# Patient Record
Sex: FEMALE | Race: WHITE | NOT HISPANIC OR LATINO | Employment: PART TIME | URBAN - NONMETROPOLITAN AREA
[De-identification: names, ages, dates, MRNs, and addresses within clinical notes are randomized per-mention and may not be internally consistent; named-entity substitution may affect disease eponyms.]

---

## 2017-02-06 ENCOUNTER — HISTORY (OUTPATIENT)
Dept: FAMILY MEDICINE | Facility: OTHER | Age: 72
End: 2017-02-06

## 2017-02-06 ENCOUNTER — OFFICE VISIT - GICH (OUTPATIENT)
Dept: FAMILY MEDICINE | Facility: OTHER | Age: 72
End: 2017-02-06

## 2017-02-06 DIAGNOSIS — M06.9 RHEUMATOID ARTHRITIS (H): ICD-10-CM

## 2017-06-12 ENCOUNTER — OFFICE VISIT - GICH (OUTPATIENT)
Dept: FAMILY MEDICINE | Facility: OTHER | Age: 72
End: 2017-06-12

## 2017-06-12 ENCOUNTER — HISTORY (OUTPATIENT)
Dept: FAMILY MEDICINE | Facility: OTHER | Age: 72
End: 2017-06-12

## 2017-06-12 DIAGNOSIS — R42 DIZZINESS AND GIDDINESS: ICD-10-CM

## 2017-06-12 DIAGNOSIS — L98.9 DISORDER OF SKIN OR SUBCUTANEOUS TISSUE: ICD-10-CM

## 2017-06-12 DIAGNOSIS — Z23 ENCOUNTER FOR IMMUNIZATION: ICD-10-CM

## 2017-06-19 ENCOUNTER — OFFICE VISIT - GICH (OUTPATIENT)
Dept: FAMILY MEDICINE | Facility: OTHER | Age: 72
End: 2017-06-19

## 2017-06-19 ENCOUNTER — HISTORY (OUTPATIENT)
Dept: FAMILY MEDICINE | Facility: OTHER | Age: 72
End: 2017-06-19

## 2017-06-19 DIAGNOSIS — M06.9 RHEUMATOID ARTHRITIS (H): ICD-10-CM

## 2017-06-19 DIAGNOSIS — Z00.00 ENCOUNTER FOR GENERAL ADULT MEDICAL EXAMINATION WITHOUT ABNORMAL FINDINGS: ICD-10-CM

## 2017-06-19 DIAGNOSIS — F32.9 MAJOR DEPRESSIVE DISORDER, SINGLE EPISODE: ICD-10-CM

## 2017-06-19 DIAGNOSIS — E78.5 HYPERLIPIDEMIA: ICD-10-CM

## 2017-06-19 DIAGNOSIS — F41.1 GENERALIZED ANXIETY DISORDER: ICD-10-CM

## 2017-06-19 LAB
A/G RATIO - HISTORICAL: 1 (ref 1–2)
ABSOLUTE BASOPHILS - HISTORICAL: 0.1 THOU/CU MM
ABSOLUTE EOSINOPHILS - HISTORICAL: 0.2 THOU/CU MM
ABSOLUTE IMMATURE GRANULOCYTES(METAS,MYELOS,PROS) - HISTORICAL: 0 THOU/CU MM
ABSOLUTE LYMPHOCYTES - HISTORICAL: 1.2 THOU/CU MM (ref 0.9–2.9)
ABSOLUTE MONOCYTES - HISTORICAL: 0.7 THOU/CU MM
ABSOLUTE NEUTROPHILS - HISTORICAL: 8.4 THOU/CU MM (ref 1.7–7)
ALBUMIN SERPL-MCNC: 3.8 G/DL (ref 3.5–5.7)
ALP SERPL-CCNC: 72 IU/L (ref 34–104)
ALT (SGPT) - HISTORICAL: 12 IU/L (ref 7–52)
ANION GAP - HISTORICAL: 12 (ref 5–18)
AST SERPL-CCNC: 17 IU/L (ref 13–39)
BACTERIA URINE: NORMAL BACTERIA/HPF
BASOPHILS # BLD AUTO: 0.5 %
BILIRUB SERPL-MCNC: 0.3 MG/DL (ref 0.3–1)
BILIRUB UR QL: NEGATIVE
BUN SERPL-MCNC: 13 MG/DL (ref 7–25)
BUN/CREAT RATIO - HISTORICAL: 19
CALCIUM SERPL-MCNC: 9 MG/DL (ref 8.6–10.3)
CHLORIDE SERPLBLD-SCNC: 105 MMOL/L (ref 98–107)
CHOL/HDL RATIO - HISTORICAL: 4.75
CHOLESTEROL TOTAL: 271 MG/DL
CLARITY, URINE: CLEAR CLARITY
CO2 SERPL-SCNC: 22 MMOL/L (ref 21–31)
COLOR UR: YELLOW COLOR
CREAT SERPL-MCNC: 0.67 MG/DL (ref 0.7–1.3)
EOSINOPHIL NFR BLD AUTO: 1.4 %
EPITHELIAL CELLS: NORMAL EPI/HPF
ERYTHROCYTE [DISTWIDTH] IN BLOOD BY AUTOMATED COUNT: 12.7 % (ref 11.5–15.5)
GFR IF NOT AFRICAN AMERICAN - HISTORICAL: >60 ML/MIN/1.73M2
GLOBULIN - HISTORICAL: 3.9 G/DL (ref 2–3.7)
GLUCOSE SERPL-MCNC: 97 MG/DL (ref 70–105)
GLUCOSE URINE: NEGATIVE MG/DL
HCT VFR BLD AUTO: 37 % (ref 33–51)
HDLC SERPL-MCNC: 57 MG/DL (ref 23–92)
HEMOGLOBIN: 12.3 G/DL (ref 12–16)
IMMATURE GRANULOCYTES(METAS,MYELOS,PROS) - HISTORICAL: 0.4 %
KETONES UR QL: NEGATIVE MG/DL
LDLC SERPL CALC-MCNC: 180 MG/DL
LEUKOCYTE ESTERASE URINE: NEGATIVE
LYMPHOCYTES NFR BLD AUTO: 11.4 % (ref 20–44)
MCH RBC QN AUTO: 31.4 PG (ref 26–34)
MCHC RBC AUTO-ENTMCNC: 33.2 G/DL (ref 32–36)
MCV RBC AUTO: 94 FL (ref 80–100)
MONOCYTES NFR BLD AUTO: 6.8 %
NEUTROPHILS NFR BLD AUTO: 79.5 % (ref 42–72)
NITRITE UR QL STRIP: NEGATIVE
NON-HDL CHOLESTEROL - HISTORICAL: 214 MG/DL
OCCULT BLOOD,URINE - HISTORICAL: ABNORMAL
PATIENT STATUS - HISTORICAL: ABNORMAL
PH UR: 6.5 [PH]
PLATELET # BLD AUTO: 351 THOU/CU MM (ref 140–440)
PMV BLD: 8.8 FL (ref 6.5–11)
POTASSIUM SERPL-SCNC: 4.2 MMOL/L (ref 3.5–5.1)
PROT SERPL-MCNC: 7.7 G/DL (ref 6.4–8.9)
PROTEIN QUALITATIVE,URINE - HISTORICAL: NEGATIVE MG/DL
RBC - HISTORICAL: NORMAL /HPF
RED BLOOD COUNT - HISTORICAL: 3.92 MIL/CU MM (ref 4–5.2)
SODIUM SERPL-SCNC: 139 MMOL/L (ref 133–143)
SP GR UR STRIP: <=1.005
TRIGL SERPL-MCNC: 171 MG/DL
UROBILINOGEN,QUALITATIVE - HISTORICAL: NORMAL EU/DL
WBC - HISTORICAL: NORMAL /HPF
WHITE BLOOD COUNT - HISTORICAL: 10.5 THOU/CU MM (ref 4.5–11)

## 2017-07-13 ENCOUNTER — HISTORY (OUTPATIENT)
Dept: FAMILY MEDICINE | Facility: OTHER | Age: 72
End: 2017-07-13

## 2017-07-13 ENCOUNTER — OFFICE VISIT - GICH (OUTPATIENT)
Dept: FAMILY MEDICINE | Facility: OTHER | Age: 72
End: 2017-07-13

## 2017-07-13 DIAGNOSIS — M06.9 RHEUMATOID ARTHRITIS (H): ICD-10-CM

## 2017-07-13 DIAGNOSIS — R07.1 CHEST PAIN ON BREATHING: ICD-10-CM

## 2017-12-27 NOTE — PROGRESS NOTES
Patient Information     Patient Name MRN Sex Rebekah Gong [id number] Female 1945      Progress Notes by Juan Valdivia MD at 2017  7:45 AM     Author:  Juan Valdivia MD Service:  (none) Author Type:  Physician     Filed:  2017  8:48 AM Encounter Date:  2017 Status:  Signed     :  Juan Valdivia MD (Physician)            See hp

## 2017-12-27 NOTE — PROGRESS NOTES
Patient Information     Patient Name MRN Sex     Rebekah Shirley [id number] Female 1945      Progress Notes by Juan Valdivia MD at 2017  8:30 AM     Author:  Juan Valdivia MD Service:  (none) Author Type:  Physician     Filed:  2017  8:50 AM Encounter Date:  2017 Status:  Signed     :  Juan Valdivia MD (Physician)            Nursing Notes:   Bere Mancia  2017  8:40 AM  Signed  Patient presents to the clinic for a follow up on dizziness.  Bere Mancia LPN....................2017 8:14 AM    Rebekah Shirley is a 72 y.o. female who presents for   Chief Complaint     Patient presents with       Dizziness      Follow up dizziness     HPI: Ms. Shirley comes in stating pain is cut in half since starting oxycodone once each AM and cymbalta; it is toerable at this time which is a major change; she has noticed that she is getting AM headaches which are new but it goes away after she gets up. Sh ewould like to continue both medications. No constipation .  Past Medical History:     Diagnosis  Date     Hx of pregnancy      4, para 3.      HYPERLIPIDEMIA, MILD      TOBACCO ABUSE      Past Surgical History:      Procedure  Laterality Date     APPENDECTOMY      Incidental appendectomy at time of adhesion lysis.       COLONOSCOPY  06    Colonoscopy (screening), normal, Dr. Orellana       LAPAROTOMY      History of partial small bowel obstruction necessitating exploratory laparotomy.  Small area of adhesions with secondary lysis felt responsible for the partial obstruction.       TUBAL LIGATION       Family History       Problem   Relation Age of Onset     Heart Disease  Father 58     MI       Arthritis  Mother      osteoarthritis       Hyperlipidemia  Mother      hyperlipidemia       Current Outpatient Prescriptions       Medication  Sig Dispense Refill     aspirin (ECOTRIN) 81 mg enteric coated tablet Take 1 tablet by mouth once daily with a meal.  0      "DULoxetine (CYMBALTA) 20 mg Delayed-release capsule Take 1 capsule by mouth once daily. 60 capsule 6     folic acid 1 mg tablet Take 1 tablet by mouth once daily.  0     methotrexate (RHEUMATREX) 2.5 mg tablet Take 25 mg by mouth once weekly.       nitroglycerin (NITROSTAT) 0.4 mg sublingual tablet Place 1 tablet under the tongue every 5 minutes if needed for Chest Pain. take up to 3 times every 5 minutes and call 911 if pain persists 1 tablet 0     oxyCODONE (ROXICODONE) 5 mg immediate release tablet Take 1 tablet by mouth every 4 hours if needed  for Pain 30 tablet 0     predniSONE (DELTASONE) 1 mg tablet Take 6 tablets by mouth once daily with a meal.  0     sulfaSALAzine (AZULFIDINE) 500 mg tablet TAKE 2 TABLETS BY MOUTH EVERY MORNING AND 3 TABLETS EVERY EVENING 450 tablet 3     No current facility-administered medications for this visit.      Medications have been reviewed by me and are current to the best of my knowledge and ability.    No Known Allergies      EXAM:   Vitals:     07/13/17 0816   BP: 118/70   Temp: 98.5  F (36.9  C)   TempSrc: Temporal   Weight: 70.3 kg (155 lb)   Height: 1.5 m (4' 11.06\")     General Appearance: Pleasant, alert, appropriate appearance for age. No acute distress  ASSESSMENT AND PLAN:  1. Chest pain on breathing    - nitroglycerin (NITROSTAT) 0.4 mg sublingual tablet; Place 1 tablet under the tongue every 5 minutes if needed for Chest Pain. take up to 3 times every 5 minutes and call 911 if pain persists  Dispense: 1 tablet; Refill: 0    2. Rheumatoid arthritis of hand, unspecified laterality, unspecified rheumatoid factor presence (HC)  Improved pain managemant  - oxyCODONE (ROXICODONE) 5 mg immediate release tablet; Take 1 tablet by mouth every 4 hours if needed  for Pain  Dispense: 30 tablet; Refill: 0/ conntract done today with 6 months Rx. Reviewed contract with her                 "

## 2017-12-28 NOTE — PROGRESS NOTES
"Patient Information     Patient Name MRN Sex Rebekah Rodrigues [id number] Female 1945      Progress Notes by Kathya Neely MD at 2017  9:30 AM     Author:  Kathya Neely MD Service:  (none) Author Type:  Physician     Filed:  2017  3:11 PM Encounter Date:  2017 Status:  Signed     :  Kathya Neely MD (Physician)            SUBJECTIVE:    Rebekah Shirley is a 72 y.o. female who presents about feeling dizzy and has a lump on her head and associates the 2 together. The \"bump\" does not hurt but \"my head does not feel right\". She has noticed the bump for at least a year with no change in size and its at the back of the base of her head so she can't see it. Just feels like a \"pimple\" when she touches it. When asked about her dizziness she is not describing vertiginous symptoms, spinning but primarily just a sensation that \"something is not right in my head or I'm not quite there\". There are no orthostatic symptoms and doesn't change with change in position or rolling over in bed. On review this patient is managed by rheumatology and is on most of her medications through [facility]'s rheumatology Department which does not include doing other labs such as CMP. She has not a physical or general exam in several years and is encouraged to come back since it appears that she has not had most immunizations updated as well. I surveyed [facility]'s records and they have not given those to her and it is not in the [facility] system which is somewhat unusual since she is on methotrexate and Remicade and prednisone.      HPI    No Known Allergies,   Current Outpatient Prescriptions:      aspirin (ECOTRIN) 81 mg enteric coated tablet, Take 1 tablet by mouth once daily with a meal., Disp: , Rfl: 0     folic acid 1 mg tablet, Take 1 tablet by mouth once daily., Disp: , Rfl: 0     methotrexate (RHEUMATREX) 2.5 mg tablet, Take 25 mg by mouth once weekly., Disp: , Rfl:      nitroglycerin (NITROSTAT) 0.4 mg " "sublingual tablet, Place 1 tablet under the tongue every 5 minutes if needed for Chest Pain. take up to 3 times every 5 minutes and call 911 if pain persists, Disp: 1 tablet, Rfl: 0     predniSONE (DELTASONE) 1 mg tablet, Take 6 tablets by mouth once daily with a meal., Disp: , Rfl: 0     sulfaSALAzine (AZULFIDINE) 500 mg tablet, TAKE 2 TABLETS BY MOUTH EVERY MORNING AND 3 TABLETS EVERY EVENING, Disp: 450 tablet, Rfl: 3     traMADol (ULTRAM) 50 mg tablet, Take 1 tablet by mouth 2 times daily if needed for Pain., Disp: 50 tablet, Rfl: 0  Medications have been reviewed by me and are current to the best of my knowledge and ability. and   Patient Active Problem List      Diagnosis Date Noted     Rheumatoid arthritis(714.0) 08/20/2013     Anxiety state, unspecified 05/06/2013     HYPERLIPIDEMIA, MILD      Social History     Social History        Marital status:  Single     Spouse name:      Number of children:  3     Years of education:  N/A     Occupational History       [employer]chen [employer]     Social History Main Topics        Smoking status:  Former Smoker     Packs/day: 0.10     Quit date: 11/5/2008     Smokeless tobacco:  Never Used     Alcohol use  No     Drug use:  No     Sexual activity:  Not on file     Other Topics  Concern     Not on file      Social History Narrative     .  Three sons.      4 grandchildren.     Works for [employer] in the kitchen.               REVIEW OF SYSTEMS:  ROS    OBJECTIVE:  /78  Pulse 68  Ht 1.52 m (4' 11.84\")  Wt 72.6 kg (160 lb)  BMI 31.41 kg/m2    EXAM:   Physical Exam   Constitutional: She is well-developed, well-nourished, and in no distress. No distress.   Cardiovascular: Normal rate.    Neurological: She is alert. Gait normal.   Skin:   On inspection of posterior scalp she has a 3 mm lump that appears to be a small inclusion cyst.   Psychiatric: Affect normal.   Nursing note and vitals reviewed.      ASSESSMENT/PLAN:    " ICD-10-CM    1. Light headed R42    2. Encounter for immunization Z23 OMNI PREVNAR 13 (AKA PNEUMOCOCCAL VACCINE 13-VALENT IM)      [place] ADMIN VACC INITIAL   3. Skin lesion of scalp L98.9         Plan:    Labs done by [facility] recently reviewed and normal hemoglobin and creatinine and SGOT.  Suggest physical and other labs due and prefers to see her PCP for this.  Reassured the skin lesion does not need to be removed or have anything to do with feeling light headed.  Pneumococcal 13 given.  Increase fluid intake.  Kathya Neely MD  3:11 PM 6/12/2017

## 2017-12-29 NOTE — PATIENT INSTRUCTIONS
Patient Information     Patient Name MRN Rebekah Whitten [id number] Female 1945      Patient Instructions by Kathya Neely MD at 2017  9:58 AM     Author:  Kathya Neely MD Service:  (none) Author Type:  Physician     Filed:  2017  9:58 AM Encounter Date:  2017 Status:  Signed     :  Kathya Neely MD (Physician)               Index [place] Related topics   Pneumococcal Shot   ________________________________________________________________________  KEY POINTS    The pneumococcal shot protects you against the bacteria called pneumococci that can cause dangerous infections in the lungs, blood, and brain.    The pneumococcal shot is recommended for all adults 65 and older. Your healthcare provider may also recommend the shot if you are younger than 65 and have a serious health condition.    It takes 2 to 3 weeks after you get the shot before you are protected from getting sick.    You can get the shot at your healthcare provider's office or at most local health departments.  ________________________________________________________________________  What is the pneumococcal shot?  The pneumococcal shot protects you against the bacteria called pneumococci. These bacteria are spread from person to person. They can cause pneumonia and dangerous infections in the blood and brain.  Pneumococcal disease can happen after an illness such as a cold or the flu. The risk of a serious infection is greater if you have a chronic disease or are over age 65. Pneumococcal disease is hard to treat. Many antibiotics that worked in past years no longer work. This makes prevention very important.  Who should have the shot?  The pneumococcal shot is recommended for all adults 65 and older. Your healthcare provider may also recommend the shot if you are younger than 65 and have a serious health condition such as lung, liver or kidney disease, sickle cell disease, or your immune system does not  work well or you have had an organ transplant. Talk to your provider to see if you are at risk and should have this shot before age 65. Check with your healthcare provider before getting the shot if you:    Are ill or have a fever    Are pregnant    Know you are allergic to the vaccine  Depending on your vaccine history and your medical condition, you may need a  booster  shot one year after the first.  What are possible side effects?  After getting this shot you may have redness, soreness, or swelling in the area where you had the shot. This usually lasts just a day or two. You cannot get a pneumococcal infection from the shot.  What else should I know about this shot?  It takes 2 to 3 weeks after you get the shot before you are protected from getting sick.  You can get the shot at your healthcare provider's office or at most local health departments. You can have a flu shot and a pneumococcal shot at the same time without increasing the risk for side effects. Check with your healthcare provider about other shots you may need.  You can get more information from:    National Foundation for Infectious Diseases  359-046-5277   http://www.nfid.org    American Lung Association  626-399-9741   http://www.lungusa.org  Developed by [employer].  Adult Advisor 2016.3 published by [employer].  Last modified: 2016-04-01  Last reviewed: 2016-03-31  This content is reviewed periodically and is subject to change as new health information becomes available. The information is intended to inform and educate and is not a replacement for medical evaluation, advice, diagnosis or treatment by a healthcare professional.  References   Adult Advisor 2016.3 Index    Copyright   2016 [employer], a division of [employer] All rights reserved.

## 2017-12-29 NOTE — H&P
Patient Information     Patient Name MRN Sex Rebekah Rodrigues [id number] Female 1945      H&P by Juan Valdivia MD at 2017  7:45 AM     Author:  Juan Valdivia MD Service:  (none) Author Type:  Physician     Filed:  2017  8:48 AM Encounter Date:  2017 Status:  Signed     :  Juan Valdivia MD (Physician)            Nursing Notes:   Bere Mancia  2017  8:04 AM  Signed  Patient presents to the clinic for a follow up on dizziness and a yearly health maintenance exam.  Bere AUGUSTIN Gavino LPN....................2017 7:45 AM    Rebekah Shirley is a 72 y.o. female who presents for   Chief Complaint     Patient presents with       Dizziness      Follow up     Medication Management      Annual review     HPI: Ms. Shirley comes in for health care maintenance; seh does not feel that things are going very well with her rheumatoid arthritis treatment in that she is not gettting much relief. Sh eis tired and is not feeling very ambitious which is a big change for her. Sleep is not consistent as she is up a lot at night because of pain. She is single and her middle son lives in same home. We discussed pain medications and anti-depressant medications and she states she will do anything to make this better. She denies suicidal thoughts. Declines mammogram  Past Medical History:     Diagnosis  Date     Hx of pregnancy      4, para 3.      HYPERLIPIDEMIA, MILD      TOBACCO ABUSE      Past Surgical History:      Procedure  Laterality Date     APPENDECTOMY      Incidental appendectomy at time of adhesion lysis.       COLONOSCOPY  06    Colonoscopy (screening), normal, Dr. Orellana       LAPAROTOMY      History of partial small bowel obstruction necessitating exploratory laparotomy.  Small area of adhesions with secondary lysis felt responsible for the partial obstruction.       TUBAL LIGATION       Family History       Problem   Relation Age of Onset     Heart Disease   "Father 58     MI       Arthritis  Mother      osteoarthritis       Hyperlipidemia  Mother      hyperlipidemia       Current Outpatient Prescriptions       Medication  Sig Dispense Refill     aspirin (ECOTRIN) 81 mg enteric coated tablet Take 1 tablet by mouth once daily with a meal.  0     folic acid 1 mg tablet Take 1 tablet by mouth once daily.  0     methotrexate (RHEUMATREX) 2.5 mg tablet Take 25 mg by mouth once weekly.       nitroglycerin (NITROSTAT) 0.4 mg sublingual tablet Place 1 tablet under the tongue every 5 minutes if needed for Chest Pain. take up to 3 times every 5 minutes and call 911 if pain persists 1 tablet 0     predniSONE (DELTASONE) 1 mg tablet Take 6 tablets by mouth once daily with a meal.  0     sulfaSALAzine (AZULFIDINE) 500 mg tablet TAKE 2 TABLETS BY MOUTH EVERY MORNING AND 3 TABLETS EVERY EVENING 450 tablet 3     traMADol (ULTRAM) 50 mg tablet Take 1 tablet by mouth 2 times daily if needed for Pain. 50 tablet 0     No current facility-administered medications for this visit.      Medications have been reviewed by me and are current to the best of my knowledge and ability.    No Known Allergies  REVIEW OF SYSTEMS:  Constitutional: see HPI  Eyes: states she needs exam as vision is not as good  Ears, nose, mouth, throat, and face: Negative  Respiratory: Negative  Cardiovascular: Negative  Gastrointestinal: Negative  Genitourinary:Negative  Integument/breast: Negative  Musculoskeletal:severe rheumatoid arthritis sx  Neurological: Negative  Psychiatric: some depression sx  Allergic/Immunologic: Negative     EXAM:   Vitals:      06/19/17 0746 06/19/17 0749   BP:  136/86   Weight: 71.9 kg (158 lb 9.6 oz)    Height: 1.499 m (4' 11\")      EXAM:  General Appearance: Pleasant, alert, appropriate appearance for age. No acute distress  Head Exam: Normocephalic, without obvious abnormality.  Ear Exam: Normal TM's bilaterally. Normal auditory canals and external ears. Non-tender.  OroPharynx Exam: " Dental hygiene adequate. Normal buccal mucosa. Normal pharynx.  Neck Exam: Supple, no masses or nodes.  Thyroid Exam: No nodules or enlargement.  Chest/Respiratory Exam: Normal chest wall and respirations. Clear to auscultation.  Breast Exam: No dimpling, nipple retraction or discharge. No masses or nodes.  Cardiovascular Exam: Regular rate and rhythm. S1, S2, no murmur, click, gallop, or rubs.  Gastrointestinal Exam: Soft, nontender, no abnormal masses or organomegaly.  Lymphatic Exam: Non-palpable nodes in neck, clavicular, axillary, or inguinal regions.  Musculoskeletal Exam: Back is straight and non-tender, full ROM of upper and lower extremities.  Neurologic Exam: Nonfocal;  normal gross motor movement, tone, and coordination. No tremor.  Psychiatric Exam: Alert and oriented, appropriate affect.   ASSESSMENT AND PLAN:  1. Hyperlipidemia, unspecified hyperlipidemia type  Lab and refill    2. Rheumatoid arthritis of hand, unspecified laterality, unspecified rheumatoid factor presence (HC)  Start cymbalta for depressive symptoms and pain management    3. Anxiety state, unspecified  stable    4. Health care maintenance  Lab   5 chronic pain from RA

## 2017-12-30 NOTE — NURSING NOTE
Patient Information     Patient Name MRN Sex Rebekah Gong [id number] Female 1945      Nursing Note by Bere Mancia at 2017  7:45 AM     Author:  Bere Mancia Service:  (none) Author Type:  (none)     Filed:  2017  8:04 AM Encounter Date:  2017 Status:  Signed     :  Bere Mancia            Patient presents to the clinic for a follow up on dizziness and a yearly health maintenance exam.  Bere Mancia LPN....................2017 7:45 AM

## 2017-12-30 NOTE — NURSING NOTE
Patient Information     Patient Name MRN Sex Rebekah Gong [id number] Female 1945      Nursing Note by Bere Mancia at 2017  8:30 AM     Author:  Bere Mancia Service:  (none) Author Type:  (none)     Filed:  2017  8:40 AM Encounter Date:  2017 Status:  Signed     :  Bere Mancia            Patient presents to the clinic for a follow up on dizziness.  Bere Mancia LPN....................2017 8:14 AM

## 2018-01-03 NOTE — PROGRESS NOTES
Patient Information     Patient Name MRN Sex     Rebekah Shirley [id number] Female 1945      Progress Notes by Juan Valdivia MD at 2017  1:30 PM     Author:  Juan Valdivia MD Service:  (none) Author Type:  Physician     Filed:  2017  2:18 PM Encounter Date:  2017 Status:  Signed     :  Juan Valdivia MD (Physician)            Nursing Notes:   Bere Mancia  2017  2:07 PM  Signed  Patient presents to the clinic for a follow up on arthritis pain.  Bere AUGUSTIN Gavino LPN....................2017 1:46 PM    Rebekah Shirley is a 71 y.o. female who presents for   Chief Complaint     Patient presents with       Pain      Arthritis pain     HPI: Ms. Shirley comes in for recheck of her rheumatoid arthritis . She was last seen by rheumatology in January with Remicaide discussion/ methotrexate has not helped at all with symptoms. The pain is really tough and mostly in her wrists and neck. She had wrist injections in August with some help for over a month. Oxycodone did help her when I gave her 10 in Sept. She has not had tramadol that [facility] can remember.  Past Medical History      Diagnosis   Date     Hx of pregnancy        4, para 3.      HYPERLIPIDEMIA, MILD       TOBACCO ABUSE       Past Surgical History       Procedure   Laterality Date     Tubal ligation        Laparotomy        History of partial small bowel obstruction necessitating exploratory laparotomy.  Small area of adhesions with secondary lysis felt responsible for the partial obstruction.       Appendectomy        Incidental appendectomy at time of adhesion lysis.       Colonoscopy   06     Colonoscopy (screening), normal, Dr. Orellana       Family History       Problem   Relation Age of Onset     Heart Disease  Father 58     MI       Arthritis  Mother      osteoarthritis       Hyperlipidemia  Mother      hyperlipidemia       Current Outpatient Prescriptions       Medication  Sig Dispense Refill     aspirin  "(ECOTRIN) 81 mg enteric coated tablet Take 1 tablet by mouth once daily with a meal.  0     folic acid 1 mg tablet Take 1 tablet by mouth once daily.  0     methotrexate (RHEUMATREX) 2.5 mg tablet Take 25 mg by mouth once weekly.       nitroglycerin (NITROSTAT) 0.4 mg sublingual tablet Place 1 tablet under the tongue every 5 minutes if needed for Chest Pain. take up to 3 times every 5 minutes and call 911 if pain persists 1 tablet 0     oxyCODONE (ROXICODONE) 5 mg immediate release tablet Take 1 tablet by mouth every 6 hours if needed  for Pain 10 tablet 0     predniSONE (DELTASONE) 5 mg tablet Take 10 mg by mouth.       sulfaSALAzine (AZULFIDINE) 500 mg tablet TAKE 2 TABLETS BY MOUTH EVERY MORNING AND 3 TABLETS EVERY EVENING 450 tablet 3     No current facility-administered medications for this visit.      Medications have been reviewed by me and are current to the best of my knowledge and ability.    No Known Allergies    EXAM:   Vitals:     02/06/17 1346   BP: 128/76   Weight: 72.1 kg (159 lb)   Height: 1.52 m (4' 11.84\")     General Appearance: Pleasant, alert, appropriate appearance for age. No acute distress  Neck Exam: has loss of ROM and tenderness and her wrists are very tender laterally; this has greatly slowed her ability to do her ADLs and do her work although she continues with her work  Musculoskeletal Exam: Shoulder:trouble lifting above horizontal  Wrist: tender deformed with swelling  ASSESSMENT AND PLAN:  1. Rheumatoid arthritis of hand, unspecified laterality, unspecified rheumatoid factor presence  Tramadol twice daily- noon and prn                 "

## 2018-01-03 NOTE — NURSING NOTE
Patient Information     Patient Name MRN Sex Rebekah Gong [id number] Female 1945      Nursing Note by Bere Mancia at 2017  1:30 PM     Author:  Bere Mancia Service:  (none) Author Type:  (none)     Filed:  2017  2:07 PM Encounter Date:  2017 Status:  Signed     :  Bere Mancia            Patient presents to the clinic for a follow up on arthritis pain.  Bere Mancia LPN....................2017 1:46 PM

## 2018-01-23 ENCOUNTER — DOCUMENTATION ONLY (OUTPATIENT)
Dept: FAMILY MEDICINE | Facility: OTHER | Age: 73
End: 2018-01-23

## 2018-01-23 PROBLEM — Z79.899 CONTROLLED SUBSTANCE AGREEMENT SIGNED: Status: ACTIVE | Noted: 2017-07-13

## 2018-01-23 PROBLEM — E78.5 HYPERLIPIDEMIA, MILD: Status: ACTIVE | Noted: 2018-01-23

## 2018-01-23 RX ORDER — PREDNISONE 1 MG/1
6 TABLET ORAL
COMMUNITY
Start: 2017-06-12 | End: 2019-03-16

## 2018-01-23 RX ORDER — DULOXETIN HYDROCHLORIDE 20 MG/1
20 CAPSULE, DELAYED RELEASE ORAL DAILY
COMMUNITY
Start: 2017-06-19 | End: 2018-05-22

## 2018-01-23 RX ORDER — FOLIC ACID 1 MG/1
1 TABLET ORAL DAILY
COMMUNITY

## 2018-01-23 RX ORDER — ASPIRIN 81 MG/1
81 TABLET ORAL
COMMUNITY
Start: 2016-01-19 | End: 2019-05-09

## 2018-01-23 RX ORDER — SULFASALAZINE 500 MG/1
TABLET ORAL
COMMUNITY
Start: 2016-02-16

## 2018-01-23 RX ORDER — OXYCODONE HYDROCHLORIDE 5 MG/1
TABLET ORAL DAILY PRN
COMMUNITY
Start: 2017-07-13 | End: 2018-05-22

## 2018-01-23 RX ORDER — NITROGLYCERIN 0.4 MG/1
0.4 TABLET SUBLINGUAL EVERY 5 MIN PRN
COMMUNITY
Start: 2017-07-13 | End: 2019-01-07

## 2018-01-25 VITALS
BODY MASS INDEX: 31.41 KG/M2 | HEIGHT: 60 IN | DIASTOLIC BLOOD PRESSURE: 78 MMHG | WEIGHT: 160 LBS | SYSTOLIC BLOOD PRESSURE: 130 MMHG | HEART RATE: 68 BPM

## 2018-01-25 VITALS
TEMPERATURE: 98.5 F | BODY MASS INDEX: 31.25 KG/M2 | WEIGHT: 155 LBS | SYSTOLIC BLOOD PRESSURE: 118 MMHG | HEIGHT: 59 IN | DIASTOLIC BLOOD PRESSURE: 70 MMHG

## 2018-01-25 VITALS
HEIGHT: 60 IN | WEIGHT: 159 LBS | DIASTOLIC BLOOD PRESSURE: 76 MMHG | BODY MASS INDEX: 31.22 KG/M2 | SYSTOLIC BLOOD PRESSURE: 128 MMHG

## 2018-01-25 VITALS
WEIGHT: 158.6 LBS | DIASTOLIC BLOOD PRESSURE: 86 MMHG | SYSTOLIC BLOOD PRESSURE: 136 MMHG | BODY MASS INDEX: 31.97 KG/M2 | HEIGHT: 59 IN

## 2018-05-22 ENCOUNTER — OFFICE VISIT (OUTPATIENT)
Dept: FAMILY MEDICINE | Facility: OTHER | Age: 73
End: 2018-05-22
Attending: FAMILY MEDICINE
Payer: COMMERCIAL

## 2018-05-22 VITALS
HEIGHT: 60 IN | BODY MASS INDEX: 30.78 KG/M2 | DIASTOLIC BLOOD PRESSURE: 74 MMHG | HEART RATE: 68 BPM | SYSTOLIC BLOOD PRESSURE: 120 MMHG | WEIGHT: 156.8 LBS

## 2018-05-22 DIAGNOSIS — M05.742 RHEUMATOID ARTHRITIS INVOLVING BOTH HANDS WITH POSITIVE RHEUMATOID FACTOR (H): Primary | ICD-10-CM

## 2018-05-22 DIAGNOSIS — M05.741 RHEUMATOID ARTHRITIS INVOLVING BOTH HANDS WITH POSITIVE RHEUMATOID FACTOR (H): Primary | ICD-10-CM

## 2018-05-22 PROCEDURE — 99214 OFFICE O/P EST MOD 30 MIN: CPT | Performed by: FAMILY MEDICINE

## 2018-05-22 PROCEDURE — G0463 HOSPITAL OUTPT CLINIC VISIT: HCPCS

## 2018-05-22 RX ORDER — OXYCODONE HYDROCHLORIDE 5 MG/1
TABLET ORAL
Qty: 90 TABLET | Refills: 0 | Status: SHIPPED | OUTPATIENT
Start: 2018-05-22 | End: 2019-01-07

## 2018-05-22 ASSESSMENT — ANXIETY QUESTIONNAIRES
6. BECOMING EASILY ANNOYED OR IRRITABLE: NOT AT ALL
5. BEING SO RESTLESS THAT IT IS HARD TO SIT STILL: NOT AT ALL
3. WORRYING TOO MUCH ABOUT DIFFERENT THINGS: NOT AT ALL
2. NOT BEING ABLE TO STOP OR CONTROL WORRYING: NOT AT ALL
GAD7 TOTAL SCORE: 0
7. FEELING AFRAID AS IF SOMETHING AWFUL MIGHT HAPPEN: NOT AT ALL
1. FEELING NERVOUS, ANXIOUS, OR ON EDGE: NOT AT ALL

## 2018-05-22 ASSESSMENT — PAIN SCALES - GENERAL: PAINLEVEL: EXTREME PAIN (8)

## 2018-05-22 ASSESSMENT — PATIENT HEALTH QUESTIONNAIRE - PHQ9: 5. POOR APPETITE OR OVEREATING: NOT AT ALL

## 2018-05-22 NOTE — MR AVS SNAPSHOT
"              After Visit Summary   2018    Rebekah Shirley    MRN: [id number]           Patient Information     Date Of Birth          1945        Visit Information        Provider Department      2018 8:30 AM Juan Valdivia MD [facility]        Today's Diagnoses     Rheumatoid arthritis involving both hands with positive rheumatoid factor (H)    -  1       Follow-ups after your visit        Who to contact     If you have questions or need follow up information about today's clinic visit or your schedule please contact [facility] directly at [phone].  Normal or non-critical lab and imaging results will be communicated to you by [email or web address], letter or phone within 4 business days after the clinic has received the results. If you do not hear from us within 7 days, please contact the clinic through TISSUELABt or phone. If you have a critical or abnormal lab result, we will notify you by phone as soon as possible.  Submit refill requests through [employer] or call your pharmacy and they will forward the refill request to us. Please allow 3 business days for your refill to be completed.          Additional Information About Your Visit        MyChart Information     Me[employer] lets you send messages to your doctor, view your test results, renew your prescriptions, schedule appointments and more. To sign up, go to [email or web address] . Click on \"Log in\" on the left side of the screen, which will take you to the Welcome page. Then click on \"Sign up Now\" on the right side of the page.     You will be asked to enter the access code listed below, as well as some personal information. Please follow the directions to create your username and password.     Your access code is: [email or web address]  Expires: 2018  9:07 AM     Your access code will  in 90 days. If you need help or a new code, please call your [place] clinic or [phone].        Care EveryWhere ID     " "This is your Care EveryWhere ID. This could be used by other organizations to access your [facility] medical records  [id number]        Your Vitals Were     Pulse Height BMI (Body Mass Index)             68 5' 0.25\" (1.53 m) 30.37 kg/m2          Blood Pressure from Last 3 Encounters:   05/22/18 120/74   07/13/17 118/70   06/19/17 136/86    Weight from Last 3 Encounters:   05/22/18 156 lb 12.8 oz (71.1 kg)   07/13/17 155 lb (70.3 kg)   06/19/17 158 lb 9.6 oz (71.9 kg)              Today, you had the following     No orders found for display         Today's Medication Changes          These changes are accurate as of 5/22/18  9:07 AM.  If you have any questions, ask your nurse or doctor.               Start taking these medicines.        Dose/Directions    oxyCODONE IR 5 MG tablet   Commonly known as:  ROXICODONE   Used for:  Rheumatoid arthritis involving both hands with positive rheumatoid factor (H)   Started by:  Juan Valdivia MD        Take 2 each AM and 1-2 at 2 pm   Quantity:  90 tablet   Refills:  0            Where to get your medicines      Some of these will need a paper prescription and others can be bought over the counter.  Ask your nurse if you have questions.     Bring a paper prescription for each of these medications     oxyCODONE IR 5 MG tablet               Information about OPIOIDS     PRESCRIPTION OPIOIDS: WHAT YOU NEED TO KNOW   You have a prescription for an opioid (narcotic) pain medicine. Opioids can cause addiction. If you have a history of chemical dependency of any type, you are at a higher risk of becoming addicted to opioids. Only take this medicine after all other options have been tried. Take it for as short a time and as few doses as possible.     Do not:    Drive. If you drive while taking these medicines, you could be arrested for driving under the influence (DUI).    Operate heavy machinery    Do any other dangerous activities while taking these medicines.     Drink any alcohol " while taking these medicines.      Take with any other medicines that contain acetaminophen. Read all labels carefully. Look for the word  acetaminophen  or  Tylenol.  Ask your pharmacist if you have questions or are unsure.    Store your pills in a secure place, locked if possible. We will not replace any lost or stolen medicine. If you don t finish your medicine, please throw away (dispose) as directed by your pharmacist. The [place] Pollution Control Agency has more information about safe disposal: https://www.pca.On license of [facility].[place]/living-green/managing-unwanted-medications    All opioids tend to cause constipation. Drink plenty of water and eat foods that have a lot of fiber, such as fruits, vegetables, prune juice, apple juice and high-fiber cereal. Take a laxative (Miralax, milk of magnesia, Colace, Senna) if you don t move your bowels at least every other day.          Primary Care Provider Office Phone # Fax #    Juan Valdivia [phone] [address] [facility] [address]        Equal Access to Services     [facility] : Hadii gloria ku hadasho Soomaali, waaxda luqadaha, qaybta kaalmada adeegyadigna, kam villa . So [facility] [phone].    ATENCIÓN: Si habla español, tiene a mauro disposición servicios gratuitos de asistencia lingüística. Llame al [phone].    We comply with applicable federal civil rights laws and [place] laws. We do not discriminate on the basis of race, color, national origin, age, disability, sex, sexual orientation, or gender identity.            Thank you!     Thank you for choosing [facility] AND \[facility]\""  for your care. Our goal is always to provide you with excellent care. Hearing back from our patients is one way we can continue to improve our services. Please take a few minutes to complete the written survey that you may receive in the mail after your visit with us. Thank you!             Your Updated Medication  List - Protect others around you: Learn how to safely use, store and throw away your medicines at www.disposemymeds.org.          This list is accurate as of 5/22/18  9:07 AM.  Always use your most recent med list.                   Brand Name Dispense Instructions for use Diagnosis    aspirin 81 MG EC tablet      Take 81 mg by mouth daily with food        folic acid 1 MG tablet    FOLVITE     Take 1 mg by mouth daily        methotrexate 2.5 MG tablet CHEMO      Take 25 mg by mouth every 7 days        nitroGLYcerin 0.4 MG sublingual tablet    NITROSTAT     Place 0.4 mg under the tongue every 5 minutes as needed for chest pain Take up to 3 times every 5 minutes and call 911 if pain persists.        oxyCODONE IR 5 MG tablet    ROXICODONE    90 tablet    Take 2 each AM and 1-2 at 2 pm    Rheumatoid arthritis involving both hands with positive rheumatoid factor (H)       predniSONE 1 MG tablet    DELTASONE     Take 6 mg by mouth daily with food        sulfaSALAzine 500 MG tablet    AZULFIDINE     TAKE 2 TABLETS BY MOUTH EVERY MORNING AND 3 TABLETS EVERY EVENING

## 2018-05-22 NOTE — PROGRESS NOTES
"  SUBJECTIVE:   Rebekah Shirley is a 72 year old female who presents to clinic today for the following health issues:  Is here asking for improved pain control measures for her severe RA associated pain in both her hands. She presently has oxycodone 5 but only takes it occasionally wo much relief. She has not tried 2 at once. Sh esleeps OK but day time is tough        Problem list and histories reviewed & adjusted, as indicated.  Additional history: as documented        Reviewed and updated as needed this visit by clinical staff  Tobacco  Allergies  Meds  Med Hx  Surg Hx  Fam Hx  Soc Hx      Reviewed and updated as needed this visit by Provider         OBJECTIVE:     /74 (BP Location: Right arm, Patient Position: Sitting, Cuff Size: Adult Large)  Pulse 68  Ht 5' 0.25\" (1.53 m)  Wt 156 lb 12.8 oz (71.1 kg)  BMI 30.37 kg/m2  Body mass index is 30.37 kg/(m^2).  GENERAL: healthy, alert and no distress  MS:markedly deformed hands related to RA        ASSESSMENT/PLAN:           1. Rheumatoid arthritis involving both hands with positive rheumatoid factor (H)  Increase dose and frequency of oxycodone and recheck 2 weeks / add stool softener daily      See Patient Instructions    RAFFY OSBORN MD  [facility]  "

## 2018-05-22 NOTE — NURSING NOTE
Patient presents in the clinic with concerns of arthritis pain, that has gotten worse lately.  Nicole Rolon LPN 5/22/2018 8:40 AM

## 2018-05-23 ENCOUNTER — NURSE TRIAGE (OUTPATIENT)
Dept: FAMILY MEDICINE | Facility: OTHER | Age: 73
End: 2018-05-23

## 2018-05-23 ASSESSMENT — ANXIETY QUESTIONNAIRES: GAD7 TOTAL SCORE: 0

## 2018-05-23 NOTE — TELEPHONE ENCOUNTER
Pt started increasing dosing of Oxycodone, 1 hr later pt got dizzy, dry mouth and hot. Please call.

## 2018-05-23 NOTE — TELEPHONE ENCOUNTER
Called and spoke to Patient after verifying last name and date of birth. Patient notified of this information and verbalized agreement with plan. Patient transferred to scheduling to set up f/u appointment prior to 2-week appointment already scheduled with Dr. Valdivia.    Jackie Saenz RN .............. 5/23/2018  1:50 PM

## 2018-05-23 NOTE — TELEPHONE ENCOUNTER
This sounds like a common side effect of oxycodone, likely dose dependant.  Suggest she go back down to 1 tablet at a time but can try 1 in the AM and in afternoon about 8 hrs later.  She should also schedule follow up visit with Dr. Valdivia.

## 2018-05-23 NOTE — TELEPHONE ENCOUNTER
"Called and spoke to Patient after verifying last name and date of birth.    S-(situation):      Pt. Started increasing dosing of Oxycone and 1 hr later, experienced symptoms 1 hour later.    B-(background):     Per notes from OV yesterday with PCP, Oxycodone 5 mg tablet was increased from once daily to taking 2 tablets each AM and 1-2 at 2PM, for RA pain.    A-(assessment):     Pt took 1st dose (2 tablets) at 5:30 AM. At 745 AM, Pt experienced dry mouth, dizziness, feeling hot. At 9:00 AM, Pt vomited large amount of liquid. Pt. Currently feeling slightly better, but \"feeling slightly dizzy and slow\", along with \"queezy\" stomach.    R-(recommendations):    Triage nurse suggested holding dose at 2 PM and will message to DOD for further consideration, as PCP is out of the clinic today. Patient is expecting a call back today.    Jackie Saenz RN .............. 5/23/2018  10:15 AM          "

## 2018-05-24 ENCOUNTER — OFFICE VISIT (OUTPATIENT)
Dept: FAMILY MEDICINE | Facility: OTHER | Age: 73
End: 2018-05-24
Attending: FAMILY MEDICINE
Payer: COMMERCIAL

## 2018-05-24 VITALS
TEMPERATURE: 98.8 F | WEIGHT: 157.6 LBS | BODY MASS INDEX: 30.52 KG/M2 | SYSTOLIC BLOOD PRESSURE: 122 MMHG | DIASTOLIC BLOOD PRESSURE: 78 MMHG

## 2018-05-24 DIAGNOSIS — M05.741 RHEUMATOID ARTHRITIS INVOLVING BOTH HANDS WITH POSITIVE RHEUMATOID FACTOR (H): Primary | ICD-10-CM

## 2018-05-24 DIAGNOSIS — M05.742 RHEUMATOID ARTHRITIS INVOLVING BOTH HANDS WITH POSITIVE RHEUMATOID FACTOR (H): Primary | ICD-10-CM

## 2018-05-24 PROCEDURE — 99213 OFFICE O/P EST LOW 20 MIN: CPT | Performed by: FAMILY MEDICINE

## 2018-05-24 PROCEDURE — G0463 HOSPITAL OUTPT CLINIC VISIT: HCPCS

## 2018-05-24 ASSESSMENT — ANXIETY QUESTIONNAIRES
7. FEELING AFRAID AS IF SOMETHING AWFUL MIGHT HAPPEN: NOT AT ALL
GAD7 TOTAL SCORE: 0
2. NOT BEING ABLE TO STOP OR CONTROL WORRYING: NOT AT ALL
1. FEELING NERVOUS, ANXIOUS, OR ON EDGE: NOT AT ALL
3. WORRYING TOO MUCH ABOUT DIFFERENT THINGS: NOT AT ALL
6. BECOMING EASILY ANNOYED OR IRRITABLE: NOT AT ALL
5. BEING SO RESTLESS THAT IT IS HARD TO SIT STILL: NOT AT ALL

## 2018-05-24 ASSESSMENT — PATIENT HEALTH QUESTIONNAIRE - PHQ9: 5. POOR APPETITE OR OVEREATING: NOT AT ALL

## 2018-05-24 ASSESSMENT — PAIN SCALES - GENERAL: PAINLEVEL: SEVERE PAIN (6)

## 2018-05-24 NOTE — PROGRESS NOTES
SUBJECTIVE:   Rebekah Shirley is a 73 year old female who presents to clinic today for the following health issues:  She took 2 oxy 5 mg and felt weak and has not taken more since- it really helped the pain; I cautioned her about driving after taking 2. We discussed taking the 5mg oxy - 1 right away in AM and 1 midmorning as a trial - she will do this.             Problem list and histories reviewed & adjusted, as indicated.  Additional history: as documented        Reviewed and updated as needed this visit by clinical staff  Tobacco  Allergies  Meds  Med Hx  Surg Hx  Fam Hx  Soc Hx      Reviewed and updated as needed this visit by Provider             OBJECTIVE:     /78 (BP Location: Right arm, Patient Position: Sitting, Cuff Size: Adult Large)  Temp 98.8  F (37.1  C) (Tympanic)  Wt 157 lb 9.6 oz (71.5 kg)  BMI 30.52 kg/m2  Body mass index is 30.52 kg/(m^2).  GENERAL: healthy, alert and no distress        ASSESSMENT/PLAN:           1. Rheumatoid arthritis involving both hands with positive rheumatoid factor (H)  Improved pain w oxy 10 mg but some side effects so will  spread out the dosing cautiously      See Patient Instructions    RAFFY OSBORN MD  [facility]

## 2018-05-24 NOTE — MR AVS SNAPSHOT
"              After Visit Summary   5/24/2018    Rebekah Shirley    MRN: [id number]           Patient Information     Date Of Birth          1945        Visit Information        Provider Department      5/24/2018 12:00 PM Juan Valdivia MD [facility]        Today's Diagnoses     Rheumatoid arthritis involving both hands with positive rheumatoid factor (H)    -  1       Follow-ups after your visit        Your next 10 appointments already scheduled     Jun 04, 2018  8:45 AM CDT   SHORT with Juan Valdivia MD   [facility]    [address]  [place] [id number] [phone]              Who to contact     If you have questions or need follow up information about today's clinic visit or your schedule please contact [facility] directly at [phone].  Normal or non-critical lab and imaging results will be communicated to you by Scifinitihart, letter or phone within 4 business days after the clinic has received the results. If you do not hear from us within 7 days, please contact the clinic through Scifinitihart or phone. If you have a critical or abnormal lab result, we will notify you by phone as soon as possible.  Submit refill requests through [employer] or call your pharmacy and they will forward the refill request to us. Please allow 3 business days for your refill to be completed.          Additional Information About Your Visit        MyChart Information     [employer] lets you send messages to your doctor, view your test results, renew your prescriptions, schedule appointments and more. To sign up, go to [email or web address] . Click on \"Log in\" on the left side of the screen, which will take you to the Welcome page. Then click on \"Sign up Now\" on the right side of the page.     You will be asked to enter the access code listed below, as well as some personal information. Please follow the directions to " create your username and password.     Your access code is: [email or web address]  Expires: 2018  9:07 AM     Your access code will  in 90 days. If you need help or a new code, please call your [facility] or [phone].        Care EveryWhere ID     This is your Care EveryWhere ID. This could be used by other organizations to access your [facility] medical records  [id number]        Your Vitals Were     Temperature BMI (Body Mass Index)                98.8  F (37.1  C) (Tympanic) 30.52 kg/m2           Blood Pressure from Last 3 Encounters:   18 122/78   18 120/74   17 118/70    Weight from Last 3 Encounters:   18 157 lb 9.6 oz (71.5 kg)   18 156 lb 12.8 oz (71.1 kg)   17 155 lb (70.3 kg)              Today, you had the following     No orders found for display       Primary Care Provider Office Phone # Fax #    Juan Valdivia [phone] [address] [facility] [address]        Equal Access to Services     [facility] AH: Hadii gloria Pastor, waaxda lulalitoadaha, qaybta kaalmada adesarahyada, kam holliday. So [facility] [phone].    ATENCIÓN: Si habla español, tiene a mauro disposición servicios gratuitos de asistencia lingüística. LlUniversity Hospitals [facility] Medical Center [phone].    We comply with applicable federal civil rights laws and [place] laws. We do not discriminate on the basis of race, color, national origin, age, disability, sex, sexual orientation, or gender identity.            Thank you!     Thank you for choosing [facility] AND [facility]  for your care. Our goal is always to provide you with excellent care. Hearing back from our patients is one way we can continue to improve our services. Please take a few minutes to complete the written survey that you may receive in the mail after your visit with us. Thank you!             Your Updated Medication List - Protect others around you: Learn how to safely use, store  and throw away your medicines at www.disposemymeds.org.          This list is accurate as of 5/24/18 12:23 PM.  Always use your most recent med list.                   Brand Name Dispense Instructions for use Diagnosis    aspirin 81 MG EC tablet      Take 81 mg by mouth daily with food        folic acid 1 MG tablet    FOLVITE     Take 1 mg by mouth daily        methotrexate 2.5 MG tablet CHEMO      Take 25 mg by mouth every 7 days        nitroGLYcerin 0.4 MG sublingual tablet    NITROSTAT     Place 0.4 mg under the tongue every 5 minutes as needed for chest pain Take up to 3 times every 5 minutes and call 911 if pain persists.        oxyCODONE IR 5 MG tablet    ROXICODONE    90 tablet    Take 2 each AM and 1-2 at 2 pm    Rheumatoid arthritis involving both hands with positive rheumatoid factor (H)       predniSONE 1 MG tablet    DELTASONE     Take 6 mg by mouth daily with food        sulfaSALAzine 500 MG tablet    AZULFIDINE     TAKE 2 TABLETS BY MOUTH EVERY MORNING AND 3 TABLETS EVERY EVENING

## 2018-05-25 ASSESSMENT — ANXIETY QUESTIONNAIRES: GAD7 TOTAL SCORE: 0

## 2018-07-11 ENCOUNTER — TRANSFERRED RECORDS (OUTPATIENT)
Dept: HEALTH INFORMATION MANAGEMENT | Facility: OTHER | Age: 73
End: 2018-07-11

## 2018-07-24 NOTE — PROGRESS NOTES
Patient Information     Patient Name  Rebekah Shirley MRN  [id number] Sex  Female   1945      Letter by Juan Valdivia MD at      Author:  Juan Valdivia MD Service:  (none) Author Type:  (none)    Filed:   Encounter Date:  2017 Status:  (Other)           Rebekah Shirley  [address]  [place] [address]          2017    Dear Ms. Shirley:    Your lipid levels remain very high. You should come in to discuss whether medication would be jonas.  Juan Valdivia MD ....................  2017   1:43 PM     Results for orders placed or performed in visit on 17       COMP METABOLIC PANEL       Result  Value Ref Range Status    SODIUM 139 133 - 143 mmol/L Final    POTASSIUM 4.2 3.5 - 5.1 mmol/L Final    CHLORIDE 105 98 - 107 mmol/L Final    CO2,TOTAL 22 21 - 31 mmol/L Final    ANION GAP 12 5 - 18                 Final    GLUCOSE 97 70 - 105 mg/dL Final    CALCIUM 9.0 8.6 - 10.3 mg/dL Final    BUN 13 7 - 25 mg/dL Final    CREATININE 0.67 (L) 0.70 - 1.30 mg/dL Final    BUN/CREAT RATIO           19                 Final    GFR if African American >60 >60 ml/min/1.73m2 Final    GFR if not African American >60 >60 ml/min/1.73m2 Final    ALBUMIN 3.8 3.5 - 5.7 g/dL Final    PROTEIN,TOTAL 7.7 6.4 - 8.9 g/dL Final    GLOBULIN                  3.9 (H) 2.0 - 3.7 g/dL Final    A/G RATIO 1.0 1.0 - 2.0                 Final    BILIRUBIN,TOTAL 0.3 0.3 - 1.0 mg/dL Final    ALK PHOSPHATASE 72 34 - 104 IU/L Final    ALT (SGPT) 12 7 - 52 IU/L Final    AST (SGOT) 17 13 - 39 IU/L Final   LIPID PANEL       Result  Value Ref Range Status    CHOLESTEROL,TOTAL 271 (H) <200 mg/dL Final    TRIGLYCERIDES 171 (H) <150 mg/dL Final    HDL CHOLESTEROL 57 23 - 92 mg/dL Final    NON-HDL CHOLESTEROL 214 (H) <145 mg/dl Final    CHOL/HDL RATIO            4.75 (H) <4.50                 Final    LDL CHOLESTEROL 180 (H) <100 mg/dL Final    PATIENT STATUS            FASTING                 Final   CBC WITH AUTO DIFFERENTIAL       Result   Value Ref Range Status    WHITE BLOOD COUNT         10.5 4.5 - 11.0 thou/cu mm Final    RED BLOOD COUNT           3.92 (L) 4.00 - 5.20 mil/cu mm Final    HEMOGLOBIN                12.3 12.0 - 16.0 g/dL Final    HEMATOCRIT                37.0 33.0 - 51.0 % Final    MCV                       94 80 - 100 fL Final    MCH                       31.4 26.0 - 34.0 pg Final    MCHC                      33.2 32.0 - 36.0 g/dL Final    RDW                       12.7 11.5 - 15.5 % Final    PLATELET COUNT            351 140 - 440 thou/cu mm Final    MPV                       8.8 6.5 - 11.0 fL Final    NEUTROPHILS               79.5 (H) 42.0 - 72.0 % Final    LYMPHOCYTES               11.4 (L) 20.0 - 44.0 % Final    MONOCYTES                 6.8 <12.0 % Final    EOSINOPHILS               1.4 <8.0 % Final    BASOPHILS                 0.5 <3.0 % Final    IMMATURE GRANULOCYTES(METAS,MYELOS,PROS) 0.4 % Final    ABSOLUTE NEUTROPHILS      8.4 (H) 1.7 - 7.0 thou/cu mm Final    ABSOLUTE LYMPHOCYTES      1.2 0.9 - 2.9 thou/cu mm Final    ABSOLUTE MONOCYTES        0.7 <0.9 thou/cu mm Final    ABSOLUTE EOSINOPHILS      0.2 <0.5 thou/cu mm Final    ABSOLUTE BASOPHILS        0.1 <0.3 thou/cu mm Final    ABSOLUTE IMMATURE GRANULOCYTES(METAS,MYELOS,PROS) 0.0 <=0.3 thou/cu mm Final   URINALYSIS W REFLEX MICROSCOPIC IF POSITIVE       Result  Value Ref Range Status    COLOR                     Yellow Yellow Color Final    CLARITY                   Clear Clear Clarity Final    SPECIFIC GRAVITY,URINE    <=1.005 (A) 1.010, 1.015, 1.020, 1.025                 Final    PH,URINE                  6.5 6.0, 7.0, 8.0, 5.5, 6.5, 7.5, 8.5                 Final    UROBILINOGEN,QUALITATIVE  Normal Normal EU/dl Final    PROTEIN, URINE Negative Negative mg/dL Final    GLUCOSE, URINE Negative Negative mg/dL Final    KETONES,URINE             Negative Negative mg/dL Final    BILIRUBIN,URINE           Negative Negative                 Final    OCCULT BLOOD,URINE         Trace (A) Negative                 Final    NITRITE                   Negative Negative                 Final    LEUKOCYTE ESTERASE        Negative Negative                 Final   URINALYSIS MICROSCOPIC       Result  Value Ref Range Status    RBC 0-2 0-2, None Seen /HPF Final    WBC None Seen 0-2, 3-5, None Seen /HPF Final    BACTERIA                  Few None Seen, Rare, Occasional, Few Bacteria/HPF Final    EPITHELIAL CELLS          Few None Seen, Few Epi/HPF Final

## 2018-10-10 ENCOUNTER — HOSPITAL ENCOUNTER (OUTPATIENT)
Dept: GENERAL RADIOLOGY | Facility: OTHER | Age: 73
Discharge: HOME | End: 2018-10-10
Attending: NURSE PRACTITIONER | Admitting: NURSE PRACTITIONER
Payer: COMMERCIAL

## 2018-10-10 ENCOUNTER — OFFICE VISIT (OUTPATIENT)
Dept: FAMILY MEDICINE | Facility: OTHER | Age: 73
End: 2018-10-10
Attending: NURSE PRACTITIONER
Payer: COMMERCIAL

## 2018-10-10 VITALS — HEART RATE: 80 BPM | SYSTOLIC BLOOD PRESSURE: 120 MMHG | DIASTOLIC BLOOD PRESSURE: 70 MMHG

## 2018-10-10 DIAGNOSIS — G89.29 OTHER CHRONIC PAIN: ICD-10-CM

## 2018-10-10 DIAGNOSIS — M77.32 CALCANEAL SPUR OF FOOT, LEFT: ICD-10-CM

## 2018-10-10 DIAGNOSIS — F43.23 ADJUSTMENT DISORDER WITH MIXED ANXIETY AND DEPRESSED MOOD: ICD-10-CM

## 2018-10-10 DIAGNOSIS — M25.572 PAIN IN JOINT INVOLVING ANKLE AND FOOT, LEFT: Primary | ICD-10-CM

## 2018-10-10 DIAGNOSIS — M25.572 PAIN IN JOINT INVOLVING ANKLE AND FOOT, LEFT: ICD-10-CM

## 2018-10-10 PROCEDURE — 99214 OFFICE O/P EST MOD 30 MIN: CPT | Performed by: NURSE PRACTITIONER

## 2018-10-10 PROCEDURE — G0463 HOSPITAL OUTPT CLINIC VISIT: HCPCS | Mod: 25

## 2018-10-10 PROCEDURE — G0463 HOSPITAL OUTPT CLINIC VISIT: HCPCS

## 2018-10-10 PROCEDURE — 73610 X-RAY EXAM OF ANKLE: CPT | Mod: LT

## 2018-10-10 RX ORDER — DULOXETIN HYDROCHLORIDE 30 MG/1
30 CAPSULE, DELAYED RELEASE ORAL DAILY
Qty: 60 CAPSULE | Refills: 0 | Status: SHIPPED | OUTPATIENT
Start: 2018-10-10 | End: 2019-01-07

## 2018-10-10 ASSESSMENT — ANXIETY QUESTIONNAIRES
1. FEELING NERVOUS, ANXIOUS, OR ON EDGE: MORE THAN HALF THE DAYS
GAD7 TOTAL SCORE: 14
5. BEING SO RESTLESS THAT IT IS HARD TO SIT STILL: MORE THAN HALF THE DAYS
IF YOU CHECKED OFF ANY PROBLEMS ON THIS QUESTIONNAIRE, HOW DIFFICULT HAVE THESE PROBLEMS MADE IT FOR YOU TO DO YOUR WORK, TAKE CARE OF THINGS AT HOME, OR GET ALONG WITH OTHER PEOPLE: VERY DIFFICULT
7. FEELING AFRAID AS IF SOMETHING AWFUL MIGHT HAPPEN: NOT AT ALL
2. NOT BEING ABLE TO STOP OR CONTROL WORRYING: NEARLY EVERY DAY
6. BECOMING EASILY ANNOYED OR IRRITABLE: MORE THAN HALF THE DAYS
4. TROUBLE RELAXING: MORE THAN HALF THE DAYS
3. WORRYING TOO MUCH ABOUT DIFFERENT THINGS: NEARLY EVERY DAY

## 2018-10-10 ASSESSMENT — PAIN SCALES - GENERAL: PAINLEVEL: EXTREME PAIN (8)

## 2018-10-10 NOTE — PROGRESS NOTES
SUBJECTIVE:   Rebekah Shirley is a 73 year old female who presents to clinic today for the following health issues:      Joint Pain    Onset: Over the last year pain has worsened    Description:   Location: Left ankle  Character: sometimes a shooting pain, sometimes a constant pain. Usually has pain every day at different times of the day.    Intensity: Currently 7-8/10, not constant pain    Progression of Symptoms: worse    Accompanying Signs & Symptoms:  Other symptoms: Denies paresthesias, weakness, swelling of left ankle    History:   Previous similar pain: no       Precipitating factors:   Trauma or overuse: YES- 4 to 5 years ago stepped wrong but did not have any immediate symptoms but in the last years especially this last year the pain has worsened.    Alleviating factors:  Improved by: As below    Therapies Tried and outcome: Switches Aleve and ibuprofen- helps for a short while.     History of rheumatoid arthritis- chronic pain, states pain is always at an 8-10/10. Remicade, methotrexate, sulfasalazine, and prednisone daily. Manages pain with Aleve or ibuprofen and oxycodone. She was supposed to see her rheumatologist tomorrow but cancelled appointment due to having diarrhea yesterday and missing work.    Denies fever, chills.      Depression and Anxiety    Status since last visit: Worse in the last 6 months. She does not feel like she has had much of an issue with depression or anxiety up until the last 6 months. She cannot recall ever being on a medication for depression or anxiety but would like to try a medications.    Other associated symptoms:None    Complicating factors: Chronic pain secondary to rheumatoid arthritis: she feels like this makes her irritable and contributes to her depression/anxiety    Significant life event: No     Current substance abuse: None    PHQ 6/5/2015 9/27/2016 10/10/2018   PHQ-9 Total Score 4 7 9   Q9: Suicide Ideation Not at all Not at all Not at all     KAYCEE-7 SCORE  5/22/2018 5/24/2018 10/10/2018   Total Score 0 0 14     PHQ-9  English  PHQ-9   Any Language  KAYCEE-7  Suicide Assessment Five-step Evaluation and Treatment (SAFE-T)             Problem list and histories reviewed & adjusted, as indicated.  Additional history: as documented    Patient Active Problem List   Diagnosis     Anxiety state     Controlled substance agreement signed     Hyperlipidemia, mild     Rheumatoid arthritis (H)     Past Surgical History:   Procedure Laterality Date     APPENDECTOMY OPEN      1993,Incidental appendectomy at time of adhesion lysis.     COLONOSCOPY      7/19/06,Colonoscopy (screening), normal, Dr. Orellana     LAPAROSCOPIC TUBAL LIGATION      1982     LAPAROTOMY EXPLORATORY      History of partial small bowel obstruction necessitating exploratory laparotomy.  Small area of adhesions with secondary lysis felt responsible for the partial obstruction.       Social History   Substance Use Topics     Smoking status: Former Smoker     Packs/day: 0.10     Quit date: 11/5/2008     Smokeless tobacco: Never Used     Alcohol use No     Family History   Problem Relation Age of Onset     HEART DISEASE Father 58     Heart Disease,MI     Arthritis Mother      Arthritis,osteoarthritis     Hyperlipidemia Mother      Hyperlipidemia,hyperlipidemia         Current Outpatient Prescriptions   Medication Sig Dispense Refill     aspirin EC 81 MG EC tablet Take 81 mg by mouth daily with food       folic acid (FOLVITE) 1 MG tablet Take 1 mg by mouth daily       methotrexate 2.5 MG tablet CHEMO Take 25 mg by mouth every 7 days       nitroGLYcerin (NITROSTAT) 0.4 MG sublingual tablet Place 0.4 mg under the tongue every 5 minutes as needed for chest pain Take up to 3 times every 5 minutes and call 911 if pain persists.       oxyCODONE IR (ROXICODONE) 5 MG tablet Take 2 each AM and 1-2 at 2 pm 90 tablet 0     predniSONE (DELTASONE) 1 MG tablet Take 6 mg by mouth daily with food       sulfaSALAzine (AZULFIDINE) 500 MG  tablet TAKE 2 TABLETS BY MOUTH EVERY MORNING AND 3 TABLETS EVERY EVENING       No Known Allergies  Recent Labs   Lab Test  06/19/17   0942  01/27/16   1027  01/27/16   1025   LDL  180*   --   196*   HDL  57   --   48   TRIG  171*   --   142   CR  0.67*  0.76   --    GFRESTBLACK  >60  >60   --    POTASSIUM  4.2  4.5   --       BP Readings from Last 3 Encounters:   10/10/18 120/70   05/24/18 122/78   05/22/18 120/74    Wt Readings from Last 3 Encounters:   05/24/18 157 lb 9.6 oz (71.5 kg)   05/22/18 156 lb 12.8 oz (71.1 kg)   07/13/17 155 lb (70.3 kg)                    Reviewed and updated as needed this visit by clinical staff  Tobacco  Allergies  Meds  Med Hx  Surg Hx  Fam Hx  Soc Hx      Reviewed and updated as needed this visit by Provider         ROS:    Constitutional, HEENT, cardiovascular, pulmonary, gi and gu systems are negative, except as otherwise noted.    OBJECTIVE:     /70 (BP Location: Right arm, Patient Position: Sitting, Cuff Size: Adult Large)  Pulse 80  Breastfeeding? No  There is no height or weight on file to calculate BMI.     GENERAL: healthy, alert and no distress  EYES: Eyes grossly normal to inspection, glasses  HENT: normocephalic, atraumatic  RESP: lungs clear to auscultation - no rales, rhonchi or wheezes  CV: regular rate and rhythm, normal S1 S2, no S3 or S4, no murmur, click or rub  MS: gross deformities of bilateral hands secondary to RA, Left Ankle: no swelling, erythema or tenderness around medial malleolus, mild edema without erythema or tenderness of lateral ankle. Denies increased discomfort with dorsiflexion and plantar flexion of left foot.   SKIN: no suspicious lesions or rashes  NEURO: Normal strength and tone, mentation intact and speech normal  PSYCH: mentation appears normal, affect normal    Diagnostic Test Results:  Xray -   PROCEDURE: XR ANKLE LT G/E 3 VW 10/10/2018 9:30 AM    HISTORY: ; Pain in joint involving ankle and foot, left    COMPARISONS:  None.    TECHNIQUE: 3 views.    FINDINGS: No acute fracture or dislocation is seen. There is lateral  soft tissue swelling. Some anterior soft tissue swelling is seen as  well. Ankle mortise appears congruent.    There is a fairly large plantar calcaneal spur.                  Impression             IMPRESSION: Lateral soft tissue swelling.    ADITHYA MARES MD          ASSESSMENT/PLAN:     1. Pain in joint involving ankle and foot, left  Chronic, symptomatic.  - XR Ankle Left G/E 3 Views; Future No acute concerns  -  Continue with symptomatic treatments for discomfort. If you decide you would like to see ortho about possible injection and/or physical therapy for discomfort call and I can put in the referral.     2. Calcaneal spur of foot, left  Asymptomatic  - No need for treatment given asymptomatic and incidental finding on xray.    3. Adjustment disorder with mixed anxiety and depressed mood  New diagnosis  - DULoxetine (CYMBALTA) 30 MG EC capsule; Take 1 capsule (30 mg) by mouth daily  Dispense: 60 capsule; Refill: 0 If in 1-2 weeks you feel like medication is helpful but could be more helpful you can increase to 1 tablet (30 mg) TWICE daily (AM and PM) to see if this further improves symptoms.    4. Other chronic pain  - DULoxetine (CYMBALTA) 30 MG EC capsule; Take 1 capsule (30 mg) by mouth daily  Dispense: 60 capsule; Refill: 0 If in 1-2 weeks you feel like medication is helpful but could be more helpful you can increase to 1 tablet (30 mg) TWICE daily (AM and PM) to see if this further improves symptoms.      FUTURE APPOINTMENTS:       - Follow-up visit: No improvement or worsening symptoms. 4-6 weeks for re-evaluation of Cymbalta.      Mariela Witt, [facility] AND [facility]

## 2018-10-10 NOTE — MR AVS SNAPSHOT
After Visit Summary   10/10/2018    Rebekah Shirley    MRN: [id number]           Patient Information     Date Of Birth          1945        Visit Information        Provider Department      10/10/2018 8:45 AM Mariela Witt CNP [facility]        Today's Diagnoses     Pain in joint involving ankle and foot, left    -  1    Calcaneal spur of foot, left        Adjustment disorder with mixed anxiety and depressed mood        Other chronic pain          Care Instructions    ASSESSMENT/PLAN:     1. Pain in joint involving ankle and foot, left  Chronic, symptomatic.  - XR Ankle Left G/E 3 Views; Future No acute concerns  -  Continue with symptomatic treatments for discomfort. If you decide you would like to see ortho about possible injection and/or physical therapy for discomfort call and I can put in the referral.     2. Calcaneal spur of foot, left  Asymptomatic  - No need for treatment given asymptomatic and incidental finding on xray.    3. Adjustment disorder with mixed anxiety and depressed mood  New diagnosis  - DULoxetine (CYMBALTA) 30 MG EC capsule; Take 1 capsule (30 mg) by mouth daily  Dispense: 60 capsule; Refill: 0 If in 1-2 weeks you feel like medication is helpful but could be more helpful you can increase to 1 tablet (30 mg) TWICE daily (AM and PM) to see if this further improves symptoms.    4. Other chronic pain  New diagnosis  - DULoxetine (CYMBALTA) 30 MG EC capsule; Take 1 capsule (30 mg) by mouth daily  Dispense: 60 capsule; Refill: 0 If in 1-2 weeks you feel like medication is helpful but could be more helpful you can increase to 1 tablet (30 mg) TWICE daily (AM and PM) to see if this further improves symptoms.      FUTURE APPOINTMENTS:       - Follow-up visit: No improvement or worsening symptoms.    Mariela Witt CNP  [facility]          Follow-ups after your visit        Future tests that were ordered for you today     Open  Future Orders        Priority Expected Expires Ordered    XR Ankle Left G/E 3 Views Routine 10/10/2018 10/10/2019 10/10/2018            Who to contact     If you have questions or need follow up information about today's clinic visit or your schedule please contact [facility] directly at [phone].  Normal or non-critical lab and imaging results will be communicated to you by MyChart, letter or phone within 4 business days after the clinic has received the results. If you do not hear from us within 7 days, please contact the clinic through MyChart or phone. If you have a critical or abnormal lab result, we will notify you by phone as soon as possible.  Submit refill requests through [employer] or call your pharmacy and they will forward the refill request to us. Please allow 3 business days for your refill to be completed.          Additional Information About Your Visit        Care EveryWhere ID     This is your Care EveryWhere ID. This could be used by other organizations to access your [place] medical records  [id number]        Your Vitals Were     Pulse Breastfeeding?                80 No           Blood Pressure from Last 3 Encounters:   10/10/18 120/70   05/24/18 122/78   05/22/18 120/74    Weight from Last 3 Encounters:   05/24/18 157 lb 9.6 oz (71.5 kg)   05/22/18 156 lb 12.8 oz (71.1 kg)   07/13/17 155 lb (70.3 kg)                 Today's Medication Changes          These changes are accurate as of 10/10/18  9:46 AM.  If you have any questions, ask your nurse or doctor.               Start taking these medicines.        Dose/Directions    DULoxetine 30 MG EC capsule   Commonly known as:  CYMBALTA   Used for:  Adjustment disorder with mixed anxiety and depressed mood, Other chronic pain   Started by:  Mariela Witt, CNP        Dose:  30 mg   Take 1 capsule (30 mg) by mouth daily   Quantity:  60 capsule   Refills:  0            Where to get your medicines      These medications  were sent to [id number] - [place], [place] - [address]  [address], [facility] [address]     Phone:  [phone]     DULoxetine 30 MG EC capsule                Primary Care Provider Fax #    Physician No Ref-Primary [phone]       No address on file        Equal Access to Services     [facility]: Hadii aad ku hadasho Soomaali, waaxda luqadaha, qaybta kaalmada adeegyada, kam felix hayraúln sahra bricenoeunicelaci villa . So [facility] [phone].    ATENCIÓN: Si habla español, tiene a amuro disposición servicios gratuitos de asistencia lingüística. Jasoname al [phone].    We comply with applicable federal civil rights laws and [place] laws. We do not discriminate on the basis of race, color, national origin, age, disability, sex, sexual orientation, or gender identity.            Thank you!     Thank you for choosing [facility] AND [facility]  for your care. Our goal is always to provide you with excellent care. Hearing back from our patients is one way we can continue to improve our services. Please take a few minutes to complete the written survey that you may receive in the mail after your visit with us. Thank you!             Your Updated Medication List - Protect others around you: Learn how to safely use, store and throw away your medicines at www.disposemymeds.org.          This list is accurate as of 10/10/18  9:46 AM.  Always use your most recent med list.                   Brand Name Dispense Instructions for use Diagnosis    aspirin 81 MG EC tablet      Take 81 mg by mouth daily with food        DULoxetine 30 MG EC capsule    CYMBALTA    60 capsule    Take 1 capsule (30 mg) by mouth daily    Adjustment disorder with mixed anxiety and depressed mood, Other chronic pain       folic acid 1 MG tablet    FOLVITE     Take 1 mg by mouth daily        methotrexate 2.5 MG tablet CHEMO      Take 25 mg by mouth every 7 days        nitroGLYcerin 0.4 MG  sublingual tablet    NITROSTAT     Place 0.4 mg under the tongue every 5 minutes as needed for chest pain Take up to 3 times every 5 minutes and call 911 if pain persists.        oxyCODONE IR 5 MG tablet    ROXICODONE    90 tablet    Take 2 each AM and 1-2 at 2 pm    Rheumatoid arthritis involving both hands with positive rheumatoid factor (H)       predniSONE 1 MG tablet    DELTASONE     Take 6 mg by mouth daily with food        sulfaSALAzine 500 MG tablet    AZULFIDINE     TAKE 2 TABLETS BY MOUTH EVERY MORNING AND 3 TABLETS EVERY EVENING

## 2018-10-10 NOTE — NURSING NOTE
Patient presents to the clinic for left ankle pain. Patient states about 4 years ago she went off a step wrong stating she didn't notice anything wrong with it so she never went in to have it looked at. Patient states it has been bothering her for the last year.  Nicola Marroquin ..............10/10/2018 8:54 AM

## 2018-10-10 NOTE — PATIENT INSTRUCTIONS
ASSESSMENT/PLAN:     1. Pain in joint involving ankle and foot, left  Chronic, symptomatic.  - XR Ankle Left G/E 3 Views; Future No acute concerns  -  Continue with symptomatic treatments for discomfort. If you decide you would like to see ortho about possible injection and/or physical therapy for discomfort call and I can put in the referral.     2. Calcaneal spur of foot, left  Asymptomatic  - No need for treatment given asymptomatic and incidental finding on xray.    3. Adjustment disorder with mixed anxiety and depressed mood  New diagnosis  - DULoxetine (CYMBALTA) 30 MG EC capsule; Take 1 capsule (30 mg) by mouth daily  Dispense: 60 capsule; Refill: 0 If in 1-2 weeks you feel like medication is helpful but could be more helpful you can increase to 1 tablet (30 mg) TWICE daily (AM and PM) to see if this further improves symptoms.    4. Other chronic pain  - DULoxetine (CYMBALTA) 30 MG EC capsule; Take 1 capsule (30 mg) by mouth daily  Dispense: 60 capsule; Refill: 0 If in 1-2 weeks you feel like medication is helpful but could be more helpful you can increase to 1 tablet (30 mg) TWICE daily (AM and PM) to see if this further improves symptoms.      FUTURE APPOINTMENTS:       - Follow-up visit: No improvement or worsening symptoms.    Mariela Witt CNP  [facility]

## 2018-10-11 ASSESSMENT — ANXIETY QUESTIONNAIRES: GAD7 TOTAL SCORE: 14

## 2018-10-11 ASSESSMENT — PATIENT HEALTH QUESTIONNAIRE - PHQ9: SUM OF ALL RESPONSES TO PHQ QUESTIONS 1-9: 9

## 2019-01-07 ENCOUNTER — OFFICE VISIT (OUTPATIENT)
Dept: FAMILY MEDICINE | Facility: OTHER | Age: 74
End: 2019-01-07
Attending: FAMILY MEDICINE
Payer: COMMERCIAL

## 2019-01-07 VITALS
SYSTOLIC BLOOD PRESSURE: 108 MMHG | DIASTOLIC BLOOD PRESSURE: 64 MMHG | BODY MASS INDEX: 31.11 KG/M2 | WEIGHT: 160.6 LBS | HEART RATE: 84 BPM

## 2019-01-07 DIAGNOSIS — M25.531 PAIN IN BOTH WRISTS: Primary | ICD-10-CM

## 2019-01-07 DIAGNOSIS — M05.741 RHEUMATOID ARTHRITIS INVOLVING BOTH HANDS WITH POSITIVE RHEUMATOID FACTOR (H): ICD-10-CM

## 2019-01-07 DIAGNOSIS — I20.89 ANGINA OF EFFORT (H): ICD-10-CM

## 2019-01-07 DIAGNOSIS — M25.532 PAIN IN BOTH WRISTS: Primary | ICD-10-CM

## 2019-01-07 DIAGNOSIS — M05.742 RHEUMATOID ARTHRITIS INVOLVING BOTH HANDS WITH POSITIVE RHEUMATOID FACTOR (H): ICD-10-CM

## 2019-01-07 PROCEDURE — G0463 HOSPITAL OUTPT CLINIC VISIT: HCPCS | Mod: 25

## 2019-01-07 PROCEDURE — 25000128 H RX IP 250 OP 636: Performed by: FAMILY MEDICINE

## 2019-01-07 PROCEDURE — G0463 HOSPITAL OUTPT CLINIC VISIT: HCPCS

## 2019-01-07 PROCEDURE — 99212 OFFICE O/P EST SF 10 MIN: CPT | Mod: 25 | Performed by: FAMILY MEDICINE

## 2019-01-07 PROCEDURE — 20605 DRAIN/INJ JOINT/BURSA W/O US: CPT | Performed by: FAMILY MEDICINE

## 2019-01-07 RX ORDER — NITROGLYCERIN 0.4 MG/1
0.4 TABLET SUBLINGUAL EVERY 5 MIN PRN
Qty: 20 TABLET | Refills: 0 | Status: SHIPPED | OUTPATIENT
Start: 2019-01-07 | End: 2020-12-22

## 2019-01-07 RX ORDER — TRIAMCINOLONE ACETONIDE 40 MG/ML
40 INJECTION, SUSPENSION INTRA-ARTICULAR; INTRAMUSCULAR ONCE
Status: COMPLETED | OUTPATIENT
Start: 2019-01-07 | End: 2019-01-07

## 2019-01-07 RX ADMIN — TRIAMCINOLONE ACETONIDE 40 MG: 40 INJECTION, SUSPENSION INTRA-ARTICULAR; INTRAMUSCULAR at 14:41

## 2019-01-07 RX ADMIN — TRIAMCINOLONE ACETONIDE 40 MG: 40 INJECTION, SUSPENSION INTRA-ARTICULAR; INTRAMUSCULAR at 14:40

## 2019-01-07 ASSESSMENT — PAIN SCALES - GENERAL: PAINLEVEL: WORST PAIN (10)

## 2019-01-07 NOTE — NURSING NOTE
Patient presents today for possible injection in her wrists.    Medication Reconciliation Complete    Deloris Montoya LPN  1/7/2019 2:10 PM

## 2019-01-07 NOTE — PROGRESS NOTES
SUBJECTIVE:  Rebekah Shirley is a 73 year old female here for 2 concerns.  First of all she has significant rheumatoid arthritis that probably affects her hands and wrist.  She has had bilateral wrist pain for many years and she has had numerous steroid injections in the past.  She reports her last steroid injection was 1-2 years ago at an outside facility.  She comes in today reporting that her wrist pain has worsened and she is requesting repeat injections.  Her pain seems similar to what she is experienced in the past.    She reports a history of angina and has had a normal stress test in the past.  She has had a prescription for nitroglycerin that she has not needed to use however she needs a refill of this as it has .    ROS:    As above otherwise ROS is unremarkable.    OBJECTIVE:  /64   Pulse 84   Wt 72.8 kg (160 lb 9.6 oz)   BMI 31.11 kg/m      EXAM:  General Appearance: Pleasant, alert, appropriate appearance for age. No acute distress  Musculoskeletal: Both wrists show significant decreased range of motion due to stiffness and pain.  She has no erythema or warmth.  She has tenderness over her radiocarpal joint on the dorsal aspect.  She also has significant swelling of her MCPs and PIPs on her fingers.    ASSESSEMENT AND PLAN:    1. Pain in both wrists    2. Rheumatoid arthritis involving both hands with positive rheumatoid factor (H)    3. Angina of effort (H)      Based on history of rheumatoid arthritis and worsening pain she requested injections.  Informed consent was obtained.  Her right dorsal wrist was cleansed in normal fashion.  A 2 mL mixture of 40 mg of Kenalog and lidocaine were used to infiltrate her radiocarpal joint.  She tolerated this well.    The procedure was then repeated on her left side as above.  She again tolerated this well, no immediate complications.  She will ice both areas to prevent postinjection flare and follow-up with rheumatology as scheduled.    A new  prescription for nitroglycerin was written at her request.  If she is using this she needs to follow-up for reassessment.    Discussed importance of her establishing care with a new provider here.    Esa Wright MD    This document was prepared using voice generated software.  While every attempt was made for accuracy, grammatical errors may exist.

## 2019-03-16 ENCOUNTER — HOSPITAL ENCOUNTER (OUTPATIENT)
Facility: OTHER | Age: 74
Setting detail: OBSERVATION
Discharge: HOME | End: 2019-03-17
Attending: EMERGENCY MEDICINE | Admitting: FAMILY MEDICINE
Payer: COMMERCIAL

## 2019-03-16 ENCOUNTER — APPOINTMENT (OUTPATIENT)
Dept: GENERAL RADIOLOGY | Facility: OTHER | Age: 74
End: 2019-03-16
Attending: EMERGENCY MEDICINE
Payer: COMMERCIAL

## 2019-03-16 DIAGNOSIS — R07.9 CHEST PAIN: ICD-10-CM

## 2019-03-16 DIAGNOSIS — E78.5 HYPERLIPIDEMIA, MILD: ICD-10-CM

## 2019-03-16 DIAGNOSIS — R07.9 CHEST PAIN, UNSPECIFIED TYPE: ICD-10-CM

## 2019-03-16 DIAGNOSIS — Z87.891 PERSONAL HISTORY OF TOBACCO USE, PRESENTING HAZARDS TO HEALTH: ICD-10-CM

## 2019-03-16 DIAGNOSIS — M79.0 RHEUMATISM, UNSPECIFIED: ICD-10-CM

## 2019-03-16 LAB
ANION GAP SERPL CALCULATED.3IONS-SCNC: 9 MMOL/L (ref 3–14)
BASOPHILS # BLD AUTO: 0.1 10E9/L (ref 0–0.2)
BASOPHILS NFR BLD AUTO: 0.6 %
BUN SERPL-MCNC: 19 MG/DL (ref 7–25)
CALCIUM SERPL-MCNC: 8.7 MG/DL (ref 8.6–10.3)
CHLORIDE SERPL-SCNC: 104 MMOL/L (ref 98–107)
CO2 SERPL-SCNC: 22 MMOL/L (ref 21–31)
CREAT SERPL-MCNC: 0.75 MG/DL (ref 0.6–1.2)
D DIMER PPP DDU-MCNC: <200 NG/ML D-DU (ref 0–230)
DIFFERENTIAL METHOD BLD: ABNORMAL
EOSINOPHIL # BLD AUTO: 0.1 10E9/L (ref 0–0.7)
EOSINOPHIL NFR BLD AUTO: 0.9 %
ERYTHROCYTE [DISTWIDTH] IN BLOOD BY AUTOMATED COUNT: 13.8 % (ref 10–15)
GFR SERPL CREATININE-BSD FRML MDRD: 76 ML/MIN/{1.73_M2}
GLUCOSE SERPL-MCNC: 149 MG/DL (ref 70–105)
HCO3 BLDV-SCNC: 22 MMOL/L (ref 21–28)
HCT VFR BLD AUTO: 36.2 % (ref 35–47)
HGB BLD-MCNC: 11.6 G/DL (ref 11.7–15.7)
IMM GRANULOCYTES # BLD: 0 10E9/L (ref 0–0.4)
IMM GRANULOCYTES NFR BLD: 0.3 %
INR PPP: 1 (ref 0–1.3)
LYMPHOCYTES # BLD AUTO: 1 10E9/L (ref 0.8–5.3)
LYMPHOCYTES NFR BLD AUTO: 12.5 %
MCH RBC QN AUTO: 31.2 PG (ref 26.5–33)
MCHC RBC AUTO-ENTMCNC: 32 G/DL (ref 31.5–36.5)
MCV RBC AUTO: 97 FL (ref 78–100)
MONOCYTES # BLD AUTO: 0.5 10E9/L (ref 0–1.3)
MONOCYTES NFR BLD AUTO: 6.6 %
NEUTROPHILS # BLD AUTO: 6.3 10E9/L (ref 1.6–8.3)
NEUTROPHILS NFR BLD AUTO: 79.1 %
O2/TOTAL GAS SETTING VFR VENT: 0 %
OXYHGB MFR BLDV: 77 %
PCO2 BLDV: 35 MM HG (ref 40–50)
PH BLDV: 7.43 PH (ref 7.32–7.43)
PLATELET # BLD AUTO: 326 10E9/L (ref 150–450)
PO2 BLDV: 42 MM HG (ref 25–47)
POTASSIUM SERPL-SCNC: 4.1 MMOL/L (ref 3.5–5.1)
RBC # BLD AUTO: 3.72 10E12/L (ref 3.8–5.2)
SODIUM SERPL-SCNC: 135 MMOL/L (ref 134–144)
TROPONIN I SERPL-MCNC: <0.03 UG/L (ref 0–0.03)
TROPONIN I SERPL-MCNC: <0.03 UG/L (ref 0–0.03)
WBC # BLD AUTO: 7.9 10E9/L (ref 4–11)

## 2019-03-16 PROCEDURE — 80048 BASIC METABOLIC PNL TOTAL CA: CPT | Performed by: EMERGENCY MEDICINE

## 2019-03-16 PROCEDURE — 93010 ELECTROCARDIOGRAM REPORT: CPT | Performed by: INTERNAL MEDICINE

## 2019-03-16 PROCEDURE — 25000132 ZZH RX MED GY IP 250 OP 250 PS 637: Performed by: FAMILY MEDICINE

## 2019-03-16 PROCEDURE — 84484 ASSAY OF TROPONIN QUANT: CPT | Mod: 91 | Performed by: EMERGENCY MEDICINE

## 2019-03-16 PROCEDURE — 85379 FIBRIN DEGRADATION QUANT: CPT | Performed by: EMERGENCY MEDICINE

## 2019-03-16 PROCEDURE — 99285 EMERGENCY DEPT VISIT HI MDM: CPT | Mod: 25 | Performed by: EMERGENCY MEDICINE

## 2019-03-16 PROCEDURE — 36415 COLL VENOUS BLD VENIPUNCTURE: CPT | Performed by: EMERGENCY MEDICINE

## 2019-03-16 PROCEDURE — 82805 BLOOD GASES W/O2 SATURATION: CPT | Performed by: EMERGENCY MEDICINE

## 2019-03-16 PROCEDURE — 71045 X-RAY EXAM CHEST 1 VIEW: CPT

## 2019-03-16 PROCEDURE — 93005 ELECTROCARDIOGRAM TRACING: CPT | Performed by: EMERGENCY MEDICINE

## 2019-03-16 PROCEDURE — 99220 ZZC INITIAL OBSERVATION CARE,LEVL III: CPT | Performed by: FAMILY MEDICINE

## 2019-03-16 PROCEDURE — 85610 PROTHROMBIN TIME: CPT | Performed by: EMERGENCY MEDICINE

## 2019-03-16 PROCEDURE — G0378 HOSPITAL OBSERVATION PER HR: HCPCS

## 2019-03-16 PROCEDURE — 85025 COMPLETE CBC W/AUTO DIFF WBC: CPT | Performed by: EMERGENCY MEDICINE

## 2019-03-16 PROCEDURE — 25000132 ZZH RX MED GY IP 250 OP 250 PS 637: Performed by: EMERGENCY MEDICINE

## 2019-03-16 PROCEDURE — 36415 COLL VENOUS BLD VENIPUNCTURE: CPT | Mod: 91 | Performed by: EMERGENCY MEDICINE

## 2019-03-16 PROCEDURE — 99285 EMERGENCY DEPT VISIT HI MDM: CPT | Mod: Z6 | Performed by: EMERGENCY MEDICINE

## 2019-03-16 PROCEDURE — 84484 ASSAY OF TROPONIN QUANT: CPT | Performed by: EMERGENCY MEDICINE

## 2019-03-16 RX ORDER — PROCHLORPERAZINE MALEATE 5 MG
5 TABLET ORAL EVERY 6 HOURS PRN
Status: DISCONTINUED | OUTPATIENT
Start: 2019-03-16 | End: 2019-03-17 | Stop reason: HOSPADM

## 2019-03-16 RX ORDER — PREDNISONE 5 MG/1
TABLET ORAL
COMMUNITY
Start: 2019-01-10 | End: 2024-03-16

## 2019-03-16 RX ORDER — PREDNISONE 5 MG/1
5 TABLET ORAL DAILY
Status: DISCONTINUED | OUTPATIENT
Start: 2019-03-17 | End: 2019-03-17 | Stop reason: HOSPADM

## 2019-03-16 RX ORDER — SULFASALAZINE 500 MG/1
1500 TABLET ORAL EVERY EVENING
Status: DISCONTINUED | OUTPATIENT
Start: 2019-03-16 | End: 2019-03-17 | Stop reason: HOSPADM

## 2019-03-16 RX ORDER — ASPIRIN 81 MG/1
81 TABLET ORAL DAILY
Status: DISCONTINUED | OUTPATIENT
Start: 2019-03-17 | End: 2019-03-17 | Stop reason: HOSPADM

## 2019-03-16 RX ORDER — SULFASALAZINE 500 MG/1
1000 TABLET ORAL DAILY
Status: DISCONTINUED | OUTPATIENT
Start: 2019-03-17 | End: 2019-03-17 | Stop reason: HOSPADM

## 2019-03-16 RX ORDER — ASPIRIN 81 MG/1
324 TABLET, CHEWABLE ORAL ONCE
Status: COMPLETED | OUTPATIENT
Start: 2019-03-16 | End: 2019-03-16

## 2019-03-16 RX ORDER — FOLIC ACID 1 MG/1
1 TABLET ORAL DAILY
Status: DISCONTINUED | OUTPATIENT
Start: 2019-03-17 | End: 2019-03-17 | Stop reason: HOSPADM

## 2019-03-16 RX ORDER — SULFASALAZINE 500 MG/1
1500 TABLET ORAL ONCE
Status: COMPLETED | OUTPATIENT
Start: 2019-03-16 | End: 2019-03-16

## 2019-03-16 RX ORDER — ONDANSETRON 4 MG/1
4 TABLET, ORALLY DISINTEGRATING ORAL EVERY 6 HOURS PRN
Status: DISCONTINUED | OUTPATIENT
Start: 2019-03-16 | End: 2019-03-17 | Stop reason: HOSPADM

## 2019-03-16 RX ORDER — NITROGLYCERIN 0.4 MG/1
0.4 TABLET SUBLINGUAL EVERY 5 MIN PRN
Status: DISCONTINUED | OUTPATIENT
Start: 2019-03-16 | End: 2019-03-16

## 2019-03-16 RX ORDER — METOCLOPRAMIDE HYDROCHLORIDE 5 MG/ML
5 INJECTION INTRAMUSCULAR; INTRAVENOUS EVERY 6 HOURS PRN
Status: DISCONTINUED | OUTPATIENT
Start: 2019-03-16 | End: 2019-03-17 | Stop reason: HOSPADM

## 2019-03-16 RX ORDER — NITROGLYCERIN 0.4 MG/1
0.4 TABLET SUBLINGUAL EVERY 5 MIN PRN
Status: DISCONTINUED | OUTPATIENT
Start: 2019-03-16 | End: 2019-03-17 | Stop reason: HOSPADM

## 2019-03-16 RX ORDER — ONDANSETRON 2 MG/ML
4 INJECTION INTRAMUSCULAR; INTRAVENOUS EVERY 6 HOURS PRN
Status: DISCONTINUED | OUTPATIENT
Start: 2019-03-16 | End: 2019-03-17 | Stop reason: HOSPADM

## 2019-03-16 RX ORDER — ACETAMINOPHEN 325 MG/1
650 TABLET ORAL EVERY 4 HOURS PRN
Status: DISCONTINUED | OUTPATIENT
Start: 2019-03-16 | End: 2019-03-17 | Stop reason: HOSPADM

## 2019-03-16 RX ORDER — LIDOCAINE 40 MG/G
CREAM TOPICAL
Status: DISCONTINUED | OUTPATIENT
Start: 2019-03-16 | End: 2019-03-17 | Stop reason: HOSPADM

## 2019-03-16 RX ORDER — ACETAMINOPHEN 325 MG/1
650 TABLET ORAL EVERY 4 HOURS PRN
Status: DISCONTINUED | OUTPATIENT
Start: 2019-03-16 | End: 2019-03-16

## 2019-03-16 RX ORDER — ALUMINA, MAGNESIA, AND SIMETHICONE 2400; 2400; 240 MG/30ML; MG/30ML; MG/30ML
30 SUSPENSION ORAL EVERY 4 HOURS PRN
Status: DISCONTINUED | OUTPATIENT
Start: 2019-03-16 | End: 2019-03-17 | Stop reason: HOSPADM

## 2019-03-16 RX ORDER — PROCHLORPERAZINE 25 MG
12.5 SUPPOSITORY, RECTAL RECTAL EVERY 12 HOURS PRN
Status: DISCONTINUED | OUTPATIENT
Start: 2019-03-16 | End: 2019-03-17 | Stop reason: HOSPADM

## 2019-03-16 RX ORDER — NALOXONE HYDROCHLORIDE 0.4 MG/ML
.1-.4 INJECTION, SOLUTION INTRAMUSCULAR; INTRAVENOUS; SUBCUTANEOUS
Status: DISCONTINUED | OUTPATIENT
Start: 2019-03-16 | End: 2019-03-16

## 2019-03-16 RX ORDER — METOCLOPRAMIDE 5 MG/1
5 TABLET ORAL EVERY 6 HOURS PRN
Status: DISCONTINUED | OUTPATIENT
Start: 2019-03-16 | End: 2019-03-17 | Stop reason: HOSPADM

## 2019-03-16 RX ORDER — ACETAMINOPHEN 650 MG/1
650 SUPPOSITORY RECTAL EVERY 4 HOURS PRN
Status: DISCONTINUED | OUTPATIENT
Start: 2019-03-16 | End: 2019-03-17 | Stop reason: HOSPADM

## 2019-03-16 RX ORDER — NALOXONE HYDROCHLORIDE 0.4 MG/ML
.1-.4 INJECTION, SOLUTION INTRAMUSCULAR; INTRAVENOUS; SUBCUTANEOUS
Status: DISCONTINUED | OUTPATIENT
Start: 2019-03-16 | End: 2019-03-17 | Stop reason: HOSPADM

## 2019-03-16 RX ADMIN — ASPIRIN 81 MG 324 MG: 81 TABLET ORAL at 16:41

## 2019-03-16 RX ADMIN — SULFASALAZINE 1500 MG: 500 TABLET ORAL at 22:53

## 2019-03-16 ASSESSMENT — MIFFLIN-ST. JEOR: SCORE: 1162.26

## 2019-03-16 NOTE — ED PROVIDER NOTES
Rebekah Shirley  : 1945 Age: 73 year old Sex: female MRN: [id number]    CC: Chest pain    HPI: Rebekah Shirley is a 73 year old female with PMH significant for rheumatoid arthritis on Humira and methotrexate, multiple anxiety complaints, no known cardiac history although she does have nitroglycerin at home who presents with sudden onset of substernal, crushing chest pain which started approximately 20 minutes prior to arrival.  She was brought in by her family and on route she took 3 nitroglycerin tabs.  On arrival here she was having ongoing pain which resolved as we got her into the room.  She denies any associated diaphoresis, nausea, shortness of breath.  States that she has not had similar pain before.    ED Course and MDM:  Chest pain: ACS versus dissection versus PE versus pneumonia versus pneumothorax versus anxiety.  Plan for EKG, troponin, chemistry, CBC, cardiac ultrasound, chest x-ray.  She was given 324 mg of aspirin  EKG with no ischemic changes, normal sinus rhythm.  Cardiac ultrasound with good global function, no evidence of right heart strain.  Initial labs returned with a negative troponin, negative d-dimer, essentially normal CBC and BMP.  Given the negative initial workup and the resolution of her chest pain, we will plan to admit the patient observation for ongoing trending of her troponins.    Final Clinical Impression:  Chest pain    Quinton Yates MD  Internal Medicine and Emergency Medicine  4:18 PM 19      Physical Exam:  /68   Pulse 70   Temp 98.5  F (36.9  C) (Tympanic)   Resp 16   Ht 1.524 m (5')   Wt 72.6 kg (160 lb)   SpO2 95%   BMI 31.25 kg/m      Gen: Awake, alert, in distress  HEENT: MMM, EOMI  CV: RRR no murmur  Pulm: CTAB no wheezes  Abd: Soft, nontender  Ext: Full ROM, no edema  Neuro: AOx3, no focal deficit  Psych: Anxious and concerned    ROS:  10 point review of systems performed and negative except as noted in HPI.    Past Medical  History:  Past Medical History:   Diagnosis Date     Hyperlipidemia     No Comments Provided     Nicotine dependence, uncomplicated     No Comments Provided     Personal history of other medical treatment (CODE)      4, para 3.         Family history:  Family History   Problem Relation Age of Onset     Heart Disease Father 58        Heart Disease,MI     Arthritis Mother         Arthritis,osteoarthritis     Hyperlipidemia Mother         Hyperlipidemia,hyperlipidemia         Social History:   Social History     Socioeconomic History     Marital status: Single     Spouse name:      Number of children: Not on file     Years of education: Not on file     Highest education level: Not on file   Occupational History     Not on file   Social Needs     Financial resource strain: Not on file     Food insecurity:     Worry: Not on file     Inability: Not on file     Transportation needs:     Medical: Not on file     Non-medical: Not on file   Tobacco Use     Smoking status: Former Smoker     Packs/day: 0.10     Last attempt to quit: 2008     Years since quitting: 10.3     Smokeless tobacco: Never Used   Substance and Sexual Activity     Alcohol use: No     Drug use: No     Sexual activity: Not on file   Lifestyle     Physical activity:     Days per week: Not on file     Minutes per session: Not on file     Stress: Not on file   Relationships     Social connections:     Talks on phone: Not on file     Gets together: Not on file     Attends [employer] service: Not on file     Active member of club or organization: Not on file     Attends meetings of clubs or organizations: Not on file     Relationship status: Not on file     Intimate partner violence:     Fear of current or ex partner: Not on file     Emotionally abused: Not on file     Physically abused: Not on file     Forced sexual activity: Not on file   Other Topics Concern     Parent/sibling w/ CABG, MI or angioplasty before 65F 55M? Not Asked   Social  History Narrative    .  Three sons.    4 grandchildren.   Works for [email or web address] in the kitchen.         Surgical History:  Past Surgical History:   Procedure Laterality Date     APPENDECTOMY OPEN      1993,Incidental appendectomy at time of adhesion lysis.     COLONOSCOPY      7/19/06,Colonoscopy (screening), normal, Dr. Orellana     LAPAROSCOPIC TUBAL LIGATION      1982     LAPAROTOMY EXPLORATORY      History of partial small bowel obstruction necessitating exploratory laparotomy.  Small area of adhesions with secondary lysis felt responsible for the partial obstruction.                  Quinton Yates MD  03/16/19 2652

## 2019-03-16 NOTE — ED TRIAGE NOTES
Patient was brought into ER via private vehicle. Patient reporting chest pain started approximately 20 minutes ago. Patient reporting sudden onset of sharp chest pain. Patient reporting now she is just feeling sore and very tired. Patient SOB. MD in room, EKG done and IV established.

## 2019-03-16 NOTE — PROGRESS NOTES
[facility] ADMISSION NOTE    Patient admitted to room 332 at approximately 1830 via wheel chair from emergency room. Patient was accompanied by son and other:grandchildren.     Verbal SBAR report received from Cat prior to patient arrival.     Patient ambulated to bed with stand-by assist. Patient alert and oriented X 3. The patient is not having any pain. 0-10 Pain Scale: 0. Admission vital signs: Blood pressure 130/68, pulse 70, temperature 98.5  F (36.9  C), temperature source Tympanic, resp. rate 16, height 1.524 m (5'), weight 72.6 kg (160 lb), SpO2 95 %, not currently breastfeeding. Patient and son were oriented to plan of care, call light, bed controls, tv, telephone, bathroom and visiting hours.     Risk Assessment    The following safety risks were identified during admission: none. Yellow risk band applied: NO.     Skin Initial Assessment    This writer admitted this patient and completed a full skin assessment and Roge score in the Adult PCS flowsheet. Appropriate interventions initiated as needed.     Secondary skin check completed by Peter Guan Risk Assessment  Sensory Perception: 4-->no impairment  Moisture: 4-->rarely moist  Activity: 3-->walks occasionally  Mobility: 3-->slightly limited  Nutrition: 3-->adequate  Friction and Shear: 3-->no apparent problem  Roge Score: 20    Ashwini Palmer

## 2019-03-17 VITALS
HEIGHT: 60 IN | OXYGEN SATURATION: 96 % | DIASTOLIC BLOOD PRESSURE: 79 MMHG | TEMPERATURE: 97.4 F | SYSTOLIC BLOOD PRESSURE: 148 MMHG | BODY MASS INDEX: 30.99 KG/M2 | WEIGHT: 157.85 LBS | HEART RATE: 71 BPM | RESPIRATION RATE: 16 BRPM

## 2019-03-17 LAB — TROPONIN I SERPL-MCNC: <0.03 UG/L (ref 0–0.03)

## 2019-03-17 PROCEDURE — G0008 ADMIN INFLUENZA VIRUS VAC: HCPCS

## 2019-03-17 PROCEDURE — 84484 ASSAY OF TROPONIN QUANT: CPT | Performed by: FAMILY MEDICINE

## 2019-03-17 PROCEDURE — 25000125 ZZHC RX 250: Performed by: FAMILY MEDICINE

## 2019-03-17 PROCEDURE — 36415 COLL VENOUS BLD VENIPUNCTURE: CPT | Performed by: FAMILY MEDICINE

## 2019-03-17 PROCEDURE — 25000132 ZZH RX MED GY IP 250 OP 250 PS 637: Performed by: FAMILY MEDICINE

## 2019-03-17 PROCEDURE — G0378 HOSPITAL OBSERVATION PER HR: HCPCS

## 2019-03-17 PROCEDURE — 99217 ZZC OBSERVATION CARE DISCHARGE: CPT | Performed by: FAMILY MEDICINE

## 2019-03-17 PROCEDURE — 25000128 H RX IP 250 OP 636: Performed by: FAMILY MEDICINE

## 2019-03-17 PROCEDURE — 90662 IIV NO PRSV INCREASED AG IM: CPT | Performed by: FAMILY MEDICINE

## 2019-03-17 RX ADMIN — ASPIRIN 81 MG: 81 TABLET, COATED ORAL at 09:22

## 2019-03-17 RX ADMIN — INFLUENZA A VIRUS A/MICHIGAN/45/2015 X-275 (H1N1) ANTIGEN (FORMALDEHYDE INACTIVATED), INFLUENZA A VIRUS A/SINGAPORE/INFIMH-16-0019/2016 IVR-186 (H3N2) ANTIGEN (FORMALDEHYDE INACTIVATED), AND INFLUENZA B VIRUS B/MARYLAND/15/2016 BX-69A (A B/COLORADO/6/2017-LIKE VIRUS) ANTIGEN (FORMALDEHYDE INACTIVATED) 0.5 ML: 60; 60; 60 INJECTION, SUSPENSION INTRAMUSCULAR at 09:21

## 2019-03-17 RX ADMIN — PREDNISONE 5 MG: 5 TABLET ORAL at 09:22

## 2019-03-17 RX ADMIN — FOLIC ACID 1 MG: 1 TABLET ORAL at 09:22

## 2019-03-17 RX ADMIN — SULFASALAZINE 1000 MG: 500 TABLET ORAL at 09:25

## 2019-03-17 ASSESSMENT — MIFFLIN-ST. JEOR: SCORE: 1152.5

## 2019-03-17 NOTE — PROGRESS NOTES
[facility] DISCHARGE NOTE    Patient discharged to home at 10:35 AM via wheel chair. Accompanied by son and staff. Discharge instructions reviewed with patient, opportunity offered to ask questions. Prescriptions - None ordered for discharge. All belongings sent with patient.    Ashwini Palmer

## 2019-03-17 NOTE — PLAN OF CARE
VS, patient rates chest pain 1/10, a dull soreness in the left upper chest but says it is not really painful, much better than when she was in the ER.     ENRICO KRUSE RN on 3/17/2019 at 1:13 AM

## 2019-03-17 NOTE — DISCHARGE SUMMARY
[facility]    Discharge Summary  Hospitalist    Date of Admission:  3/16/2019  Date of Discharge:  3/17/2019  Discharging Provider: Austyn Reis  Date of Service (when I saw the patient): 03/17/19    Discharge Diagnoses   Principal Problem:    Chest pain (3/16/2019)  Active Problems:    Anxiety state (5/6/2013)    Rheumatoid arthritis (H) (8/20/2013)      History of Present Illness   Rebekah Shirley is an 73 year old female who presented with acute onset of substernal chest pain that came on about 20 minutes prior to arrival at the emergency room.  She had just walked in the bedroom and felt some tightness in her chest and perhaps some shortness of breath but no diaphoresis.  She has a prescription for nitroglycerin which she has had for some time which she uses rarely.  She took a nitroglycerin and felt somewhat lightheaded and had a headache.  She is not sure if she waited 5 minutes before she took the second 1 and then really felt dizzy.  She told her family about her symptoms and they brought her to the emergency room via private car.  She took a third nitroglycerin on the way, and her symptoms seem to pass out the time she was getting settled into her room.  He has some slight nausea.  She had some similar symptoms about 3 years ago and she had a stress echo which showed no evidence of ischemia.     She has a history of rheumatoid arthritis for which she is on Remicade infusions and methotrexate in addition to sulfasalazine.  She does have a history of anxiety.  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  She denies any swelling of her ankles.  She denies any heartburn.  No recent illness.     She was seen and evaluated in the emergency room and had a negative troponin, negative d-dimer, negative CBC and BMP and her chest x-ray was  unremarkable.  Her EKG also showed no evidence of ischemia.  Bedside cardiac ultrasound done by the emergency room physician showed good global function with no evidence of right heart strain.  Referred for observation to rule out acute coronary syndrome with serial enzymes and EKGs.          Hospital Course   Rebekah Shirley was admitted on 3/16/2019.  She was monitored on telemetry overnight with no recurrence of symptoms.  Her troponins continue to be negative.  She was discharged home the following morning and follow-up was arranged with Dr. Curry for internal medicine consult for consideration of functional cardiac testing or possible CT angiography.  She has nitroglycerin at home which she can take if needed and instructed what to watch for as far as return to the hospital.  Or return sooner if problems or questions.    Austyn Reis MD    Significant Results and Procedures       Pending Results   None  Unresulted Labs Ordered in the Past 30 Days of this Admission     No orders found for last 61 day(s).          Code Status   Full Code       Primary Care Physician   Physician No Ref-Primary    Physical Exam   Temp: 97.4  F (36.3  C) Temp src: Tympanic BP: 148/79 Pulse: 71 Heart Rate: 79 Resp: 16 SpO2: 96 % O2 Device: None (Room air)    Vitals:    03/16/19 1618 03/17/19 0300   Weight: 72.6 kg (160 lb) 71.6 kg (157 lb 13.6 oz)     Vital Signs with Ranges  Temp:  [97.4  F (36.3  C)-98.7  F (37.1  C)] 97.4  F (36.3  C)  Pulse:  [64-80] 71  Heart Rate:  [] 79  Resp:  [12-24] 16  BP: ()/() 148/79  SpO2:  [93 %-98 %] 96 %  I/O last 3 completed shifts:  In: 240 [P.O.:240]  Out: 700 [Urine:700]    Constitutional: Alert and cooperative, no distress  Eyes: PERRLA  ENT: Mucous membranes are moist  Respiratory: Lungs are clear  Cardiovascular: RRR without murmur.  No pedal edema  GI: Abdomen is soft and nontender    Discharge Disposition   Discharged to home  Condition at discharge:  Stable    Consultations This Hospital Stay   None    Time Spent on this Encounter   IAustyn, personally saw the patient today and spent less than or equal to 30 minutes discharging this patient.    Discharge Orders      Reason for your hospital stay    Chest pain     Follow-up and recommended labs and tests     Follow up with Dr. Curry , at [facility] in the next week or so for consultation and consideration of functional cardiac testing.     Activity    Your activity upon discharge: activity as tolerated     Full Code     Diet    Follow this diet upon discharge: Orders Placed This Encounter      Regular Diet Adult     Discharge Medications   Current Discharge Medication List      CONTINUE these medications which have NOT CHANGED    Details   nitroGLYcerin (NITROSTAT) 0.4 MG sublingual tablet Place 1 tablet (0.4 mg) under the tongue every 5 minutes as needed for chest pain  Qty: 20 tablet, Refills: 0    Associated Diagnoses: Angina of effort (H)      predniSONE (DELTASONE) 5 MG tablet TAKE 1 TABLET EVERY DAY    Comments: Please include the quantity of tablets and (strength) per dose in sig.      aspirin EC 81 MG EC tablet Take 81 mg by mouth daily with food      folic acid (FOLVITE) 1 MG tablet Take 1 mg by mouth daily      methotrexate 2.5 MG tablet CHEMO Take 25 mg by mouth every 7 days      sulfaSALAzine (AZULFIDINE) 500 MG tablet TAKE 2 TABLETS BY MOUTH EVERY MORNING AND 3 TABLETS EVERY EVENING           Allergies   No Known Allergies  Data   Most Recent 3 CBC's:  Recent Labs   Lab Test 03/16/19  1635 06/19/17  0921 05/21/15  1005   WBC 7.9  --   --    HGB 11.6* 12.3 12.2   MCV 97 94 94    351 327      Most Recent 3 BMP's:  Recent Labs   Lab Test 03/16/19  1635 06/19/17  0942 01/27/16  1027    139 137   POTASSIUM 4.1 4.2 4.5   CHLORIDE 104 105 104   CO2 22 22 25   BUN 19 13 21   CR 0.75 0.67* 0.76   ANIONGAP 9  --   --    VALERIE 8.7 9.0 8.9   * 97 97     Most  Recent 2 LFT's:  Recent Labs   Lab Test 06/19/17  0942 01/27/16  1027   ALKPHOS 72 74   BILITOTAL 0.3 0.4     Most Recent INR's and Anticoagulation Dosing History:  Anticoagulation Dose History     Recent Dosing and Labs Latest Ref Rng & Units 3/16/2019    INR 0 - 1.3 1.00        Most Recent 3 Troponin's:  Recent Labs   Lab Test 03/17/19  0005 03/16/19  1815 03/16/19  1635   TROPI <0.030 <0.030 <0.030     Most Recent Cholesterol Panel:  Recent Labs   Lab Test 06/19/17  0942   *   HDL 57   TRIG 171*     Most Recent 6 Bacteria Isolates From Any Culture (See EPIC Reports for Culture Details):No lab results found.  Most Recent TSH, T4 and A1c Labs:No lab results found.  Results for orders placed or performed during the hospital encounter of 03/16/19   XR Chest Port 1 View    Narrative    PROCEDURE:  XR CHEST PORT 1 VW    HISTORY: chest pain. .    COMPARISON:  1/19/16.    FINDINGS:    The cardiomediastinal contours are stable.  No focal consolidation, effusion or pneumothorax.      Impression    IMPRESSION:  Stable portable chest.      LOBO DODGE MD

## 2019-03-17 NOTE — PROGRESS NOTES
Patient asleep in bed. At 2230 this evening she denied chest pain. Will assess vital signs when patient wakes up next to urinate.     ENRICO KRUSE RN on 3/17/2019 at 1:14 AM

## 2019-03-17 NOTE — H&P
[facility]    History and Physical  Hospitalist       Date of Admission:  3/16/2019    Assessment & Plan   Rebekah Shirley is a 73 year old female who presents with acute onset substernal chest pain    Principal Problem:    Chest pain    Assessment: Initial evaluation for coronary syndrome is negative.  She has had no further symptoms.    Plan: Serial enzymes and EKG.  Reevaluate if recurrent symptoms.  Active Problems:    Anxiety state    Assessment: Stable    Plan: Expectant management    Rheumatoid arthritis (H)    Assessment: Long-standing and quiescent.  She is on low-dose prednisone in addition to her Biologics as well as methotrexate and sulfasalazine    Plan: Continue home medications for now    DVT Prophylaxis: She is only on observation.  If she ends up being admitted will address again  Code Status: Full Code    Austyn Reis    Primary Care Physician   Physician No Ref-Primary    Chief Complaint   Chest pain    History is obtained from the patient, ED physician and chart review.    History of Present Illness   Rebekah Shirley is a 73 year old female who presents with acute onset of substernal chest pain that came on about 20 minutes prior to arrival at the emergency room.  She had just walked in the bedroom and felt some tightness in her chest and perhaps some shortness of breath but no diaphoresis.  She has a prescription for nitroglycerin which she has had for some time which she uses rarely.  She took a nitroglycerin and felt somewhat lightheaded and had a headache.  She is not sure if she waited 5 minutes before she took the second 1 and then really felt dizzy.  She told her family about her symptoms and they brought her to the emergency room via private car.  She took a third nitroglycerin on the way, and her symptoms seem to pass out the time she was getting settled into her room.  He has some slight nausea.  She had some similar symptoms about 3 years ago and she had a stress  echo which showed no evidence of ischemia.    She has a history of rheumatoid arthritis for which she is on Remicade infusions and methotrexate in addition to sulfasalazine.  She does have a history of anxiety.  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  She denies any swelling of her ankles.  She denies any heartburn.  No recent illness.    She was seen and evaluated in the emergency room and had a negative troponin, negative d-dimer, negative CBC and BMP and her chest x-ray was unremarkable.  Her EKG also showed no evidence of ischemia.  Bedside cardiac ultrasound done by the emergency room physician showed good global function with no evidence of right heart strain.  Deferred for observation to rule out acute coronary syndrome with serial enzymes and EKGs.    Past Medical History    I have reviewed this patient's medical history and updated it with pertinent information if needed.   Past Medical History:   Diagnosis Date     Hyperlipidemia     No Comments Provided     Nicotine dependence, uncomplicated     No Comments Provided     Personal history of other medical treatment (CODE)      4, para 3.       Past Surgical History   I have reviewed this patient's surgical history and updated it with pertinent information if needed.  Past Surgical History:   Procedure Laterality Date     APPENDECTOMY OPEN      ,Incidental appendectomy at time of adhesion lysis.     COLONOSCOPY      06,Colonoscopy (screening), normal, Dr. Orellana     LAPAROSCOPIC TUBAL LIGATION           LAPAROTOMY EXPLORATORY      History of partial small bowel obstruction necessitating exploratory laparotomy.  Small area of adhesions with secondary lysis felt responsible for the partial obstruction.       Prior to Admission Medications   Prior to Admission Medications   Prescriptions  Last Dose Informant Patient Reported? Taking?   aspirin EC 81 MG EC tablet   Yes No   Sig: Take 81 mg by mouth daily with food   folic acid (FOLVITE) 1 MG tablet   Yes No   Sig: Take 1 mg by mouth daily   methotrexate 2.5 MG tablet CHEMO   Yes No   Sig: Take 25 mg by mouth every 7 days   nitroGLYcerin (NITROSTAT) 0.4 MG sublingual tablet 3/16/2019 at Unknown time  No Yes   Sig: Place 1 tablet (0.4 mg) under the tongue every 5 minutes as needed for chest pain   predniSONE (DELTASONE) 5 MG tablet   Yes Yes   Sig: TAKE 1 TABLET EVERY DAY   sulfaSALAzine (AZULFIDINE) 500 MG tablet   Yes No   Sig: TAKE 2 TABLETS BY MOUTH EVERY MORNING AND 3 TABLETS EVERY EVENING      Facility-Administered Medications: None     Allergies   No Known Allergies    Social History   I have reviewed this patient's social history and updated it with pertinent information if needed. Rebekah Shirley  reports that she quit smoking about 10 years ago. She smoked 0.10 packs per day. she has never used smokeless tobacco. She reports that she does not drink alcohol or use drugs.    Family History   I have reviewed this patient's family history and updated it with pertinent information if needed.   Family History   Problem Relation Age of Onset     Heart Disease Father 58        Heart Disease,MI     Arthritis Mother         Arthritis,osteoarthritis     Hyperlipidemia Mother         Hyperlipidemia,hyperlipidemia       Review of Systems     REVIEW OF SYSTEMS:    Constitutional: normal energy and appetite, no recent sick contacts  Eyes: no changes in vision  Ears, nose, mouth, throat, and face: no mouth sores, dysphagia, or odynophagia  Respiratory: no shortness of breath, cough, or wheezing. No aspiration symptoms.   Cardiovascular: no chest pain, palpitations, orthopnea, increased lower extremity edema, or syncope.   Gastrointestinal: no constipation, diarrhea, nausea, vomiting or abdominal pain.  Genitourinary: no dysuria, hematuria, urgency or frequency.    Hematologic/lymphatic: no unintentional weight loss or night sweats.  Musculoskeletal: no pain to extremities or falls.   Neurological: no new weakness, tingling, numbness.   Psychiatric: no hallucinations or delusions.  Endocrine: not a known diabetic.        Physical Exam   Temp: 98  F (36.7  C) Temp src: Tympanic BP: 130/68 Pulse: 70 Heart Rate: 77 Resp: 16 SpO2: 95 % O2 Device: None (Room air)    Vital Signs with Ranges  Temp:  [98  F (36.7  C)-98.5  F (36.9  C)] 98  F (36.7  C)  Pulse:  [64-80] 70  Heart Rate:  [70-81] 77  Resp:  [12-24] 16  BP: ()/() 130/68  SpO2:  [94 %-98 %] 95 %  160 lbs 0 oz    General Appearance: Pleasant, alert, appropriate appearance for age. No acute distress  Head Exam: Normal. Normocephalic, atraumatic.  Eye Exam: PERRLA, EOMI, conjunctivae, sclerae normal.  Ear Exam: Normal TM's bilaterally. Normal auditory canals and external ears. Non-tender.  Nose Exam: Normal external nose, mucus membranes, and septum.  OroPharynx Exam:  Dental hygiene adequate. Normal buccal mucosa. Normal pharynx.  Neck Exam:  Supple, no masses or nodes. No bruits  Thyroid Exam: No nodules or enlargement.  Chest/Respiratory Exam: Normal chest wall and respirations. Clear to auscultation.  No definite tenderness to palpation over the costochondral junctions  Cardiovascular Exam: Regular rate and rhythm. S1, S2, no murmur, click, gallop, or rubs.  Gastrointestinal Exam: Soft, non-tender, no masses or organomegaly.  Lymphatic Exam: Non-palpable nodes in neck,clavicular  regions.  Musculoskeletal Exam: Typical changes of rheumatoid arthritis seen in her hands  Foot Exam: Left and right foot: good pedal pulses, no lesions, nail hygiene good.  No pedal edema  Skin: no rash or abnormalities  Neurologic Exam:  normal gross motor, tone coordination and no tremor.  Psychiatric Exam: Alert and oriented - appropriate affect.     Data   Data reviewed today:  I personally reviewed the EKG tracing showing No  evidence of ischemia and the chest x-ray image(s) showing A clear chest.  Recent Labs   Lab 03/16/19  1815 03/16/19  1635   WBC  --  7.9   HGB  --  11.6*   MCV  --  97   PLT  --  326   INR  --  1.00   NA  --  135   POTASSIUM  --  4.1   CHLORIDE  --  104   CO2  --  22   BUN  --  19   CR  --  0.75   ANIONGAP  --  9   VALERIE  --  8.7   GLC  --  149*   TROPI <0.030 <0.030       Recent Results (from the past 24 hour(s))   XR Chest Port 1 View    Narrative    PROCEDURE:  XR CHEST PORT 1 VW    HISTORY: chest pain. .    COMPARISON:  1/19/16.    FINDINGS:    The cardiomediastinal contours are stable.  No focal consolidation, effusion or pneumothorax.      Impression    IMPRESSION:  Stable portable chest.      LOBO DODGE MD

## 2019-03-17 NOTE — PROVIDER NOTIFICATION
03/17/19 0423   Vital Signs   Temp 97.9  F (36.6  C)   Temp src Tympanic   Resp 16   Pulse 71   Pulse/Heart Rate Source Monitor   /61   Oxygen Therapy   SpO2 95 %   O2 Device None (Room air)   Pain/Comfort   Patient Currently in Pain denies     Pt says she has slept much of the night. She has been observed in bed with eyes closed upon most checks. Denies chest pain now and says she had not woken up with any pain.     ENRICO KRUSE RN on 3/17/2019 at 5:49 AM

## 2019-03-21 ENCOUNTER — OFFICE VISIT (OUTPATIENT)
Dept: INTERNAL MEDICINE | Facility: OTHER | Age: 74
End: 2019-03-21
Attending: INTERNAL MEDICINE
Payer: COMMERCIAL

## 2019-03-21 VITALS
SYSTOLIC BLOOD PRESSURE: 118 MMHG | DIASTOLIC BLOOD PRESSURE: 80 MMHG | HEART RATE: 72 BPM | WEIGHT: 160 LBS | BODY MASS INDEX: 31.25 KG/M2 | RESPIRATION RATE: 16 BRPM | TEMPERATURE: 97.6 F

## 2019-03-21 DIAGNOSIS — R07.9 CHEST PAIN, UNSPECIFIED TYPE: Primary | ICD-10-CM

## 2019-03-21 DIAGNOSIS — M05.741 RHEUMATOID ARTHRITIS INVOLVING BOTH HANDS WITH POSITIVE RHEUMATOID FACTOR (H): ICD-10-CM

## 2019-03-21 DIAGNOSIS — E78.5 HYPERLIPIDEMIA, MILD: ICD-10-CM

## 2019-03-21 DIAGNOSIS — M05.742 RHEUMATOID ARTHRITIS INVOLVING BOTH HANDS WITH POSITIVE RHEUMATOID FACTOR (H): ICD-10-CM

## 2019-03-21 PROCEDURE — G0463 HOSPITAL OUTPT CLINIC VISIT: HCPCS

## 2019-03-21 PROCEDURE — 99215 OFFICE O/P EST HI 40 MIN: CPT | Performed by: INTERNAL MEDICINE

## 2019-03-21 ASSESSMENT — ENCOUNTER SYMPTOMS
ARTHRALGIAS: 1
DIAPHORESIS: 1
DIZZINESS: 1
JOINT SWELLING: 1
NAUSEA: 1
SHORTNESS OF BREATH: 1
CHEST TIGHTNESS: 1

## 2019-03-21 ASSESSMENT — PAIN SCALES - GENERAL: PAINLEVEL: NO PAIN (0)

## 2019-03-21 ASSESSMENT — PATIENT HEALTH QUESTIONNAIRE - PHQ9: SUM OF ALL RESPONSES TO PHQ QUESTIONS 1-9: 0

## 2019-03-21 NOTE — PROGRESS NOTES
Chief Complaint:  This patient is here for a consultation regarding chest pain.    HPI: Consultation is requested by Dr. Reis for evaluation of chest pain.  This patient was in the hospital overnight because of an episode of chest pain.  The pain started shortly after she had something to eat one afternoon.  It was probably 3:00 in the afternoon or so when she started developing substernal chest pain that did radiate towards her jaw.  She felt lightheaded with this and also had some nausea, diaphoresis and dyspnea.  She did try some nitroglycerin that she has and they did not seem to make any difference.  They were fresh nitroglycerin, she just had them refilled not too long ago.  I asked her why she even had nitroglycerin as she does not have a history of coronary artery disease, she had been given these a number of years ago when we previously did a stress test.  That stress test was negative for ischemia.  She tells me that every once in a while since then she will use the nitroglycerin because she has chest pain or stress or tension.    Her emergency room visit included negative chest x-ray and EKG.  Her lab was negative including d-dimer and troponin.  It was felt that she should be ruled out overnight and she was hospitalized.  Serial EKGs and troponins were negative.  She had no further episodes of the chest pain which basically resolved shortly after she arrived in the emergency room.  She has not had any pain since.    Cardiac risk factors include a previous history of cigarette smoking.  She does not have hypertension and is somewhat overweight but not obese.  She has significant hyperlipidemia that is not treated.  She does not have diabetes.  Family history is reviewed below.  She of course does have rheumatoid arthritis.    Medications are reconciled.  Past medical history, past surgical history, family history and social histories are all reviewed and updated.  Hospital and emergency room records  are reviewed.    Past Medical History:   Diagnosis Date     Hyperlipidemia     No Comments Provided     Nicotine dependence, uncomplicated     No Comments Provided     Personal history of other medical treatment (CODE)      4, para 3.     Rheumatoid arthritis (H)        Past Surgical History:   Procedure Laterality Date     APPENDECTOMY OPEN      Incidental appendectomy at time of adhesion lysis.     COLONOSCOPY      06,Colonoscopy (screening), normal, Dr. Orellana     LAPAROSCOPIC TUBAL LIGATION           LAPAROTOMY EXPLORATORY      History of partial small bowel obstruction necessitating exploratory laparotomy.  Small area of adhesions with secondary lysis felt responsible for the partial obstruction.       Current Outpatient Medications   Medication Sig Dispense Refill     aspirin EC 81 MG EC tablet Take 81 mg by mouth daily with food       folic acid (FOLVITE) 1 MG tablet Take 1 mg by mouth daily       inFLIXimab Inject into the vein once for 1 dose       methotrexate 2.5 MG tablet CHEMO Take 25 mg by mouth every 7 days       nitroGLYcerin (NITROSTAT) 0.4 MG sublingual tablet Place 1 tablet (0.4 mg) under the tongue every 5 minutes as needed for chest pain 20 tablet 0     predniSONE (DELTASONE) 5 MG tablet TAKE 1 TABLET EVERY DAY       sulfaSALAzine (AZULFIDINE) 500 MG tablet TAKE 2 TABLETS BY MOUTH EVERY MORNING AND 3 TABLETS EVERY EVENING         No Known Allergies    Family History   Problem Relation Age of Onset     Heart Disease Father 58        MI     Arthritis Mother      Hyperlipidemia Mother      Other - See Comments Sister         Occupational lung disease     Other - See Comments Brother         Laryngeal cancer     Heart Disease Sister         Surgery for heart       Social History     Socioeconomic History     Marital status: Single     Spouse name:      Number of children: Not on file     Years of education: Not on file     Highest education level: Not on file    Occupational History     Not on file   Social Needs     Financial resource strain: Not on file     Food insecurity:     Worry: Not on file     Inability: Not on file     Transportation needs:     Medical: Not on file     Non-medical: Not on file   Tobacco Use     Smoking status: Former Smoker     Packs/day: 0.10     Last attempt to quit: 11/5/2008     Years since quitting: 10.3     Smokeless tobacco: Never Used   Substance and Sexual Activity     Alcohol use: No     Drug use: No     Sexual activity: Not on file   Lifestyle     Physical activity:     Days per week: Not on file     Minutes per session: Not on file     Stress: Not on file   Relationships     Social connections:     Talks on phone: Not on file     Gets together: Not on file     Attends Rastafarian service: Not on file     Active member of club or organization: Not on file     Attends meetings of clubs or organizations: Not on file     Relationship status: Not on file     Intimate partner violence:     Fear of current or ex partner: Not on file     Emotionally abused: Not on file     Physically abused: Not on file     Forced sexual activity: Not on file   Other Topics Concern     Parent/sibling w/ CABG, MI or angioplasty before 65F 55M? Not Asked   Social History Narrative    . Three sons. 4 grandchildren. Works for [employer] in the kitchen.       Review of Systems   Constitutional: Positive for diaphoresis.   Respiratory: Positive for chest tightness and shortness of breath.    Gastrointestinal: Positive for nausea.   Musculoskeletal: Positive for arthralgias, gait problem and joint swelling.   Neurological: Positive for dizziness.       Physical Exam   Constitutional: She appears well-developed and well-nourished. No distress.   HENT:   Head: Normocephalic.   Neck: Normal range of motion. Neck supple. Normal carotid pulses and no JVD present. Carotid bruit is not present. No tracheal deviation present. No thyromegaly present.    Cardiovascular: Normal rate, regular rhythm and normal heart sounds. Exam reveals no gallop and no friction rub.   No murmur heard.  Pulmonary/Chest: Effort normal and breath sounds normal. No stridor. No respiratory distress. She has no wheezes. She has no rales.   Abdominal: Soft. Bowel sounds are normal. She exhibits no distension and no mass. There is no tenderness. There is no rebound and no guarding.   Musculoskeletal: She exhibits no edema.   She does have synovitis, most prominent in her PIP joints   Lymphadenopathy:     She has no cervical adenopathy.   Skin: Skin is warm and dry. She is not diaphoretic.   Nursing note and vitals reviewed.      Assessment:      ICD-10-CM    1. Chest pain, unspecified type R07.9 NM Lexiscan stress test   2. Rheumatoid arthritis involving both hands with positive rheumatoid factor (H) M05.741     M05.742    3. Hyperlipidemia, mild E78.5         Plan: This patient had an episode of chest pain that certainly sounds suspicious for possible occult coronary artery disease and angina.  Fortunately, she did rule out for myocardial ischemia.  Her medications were unchanged.  She is here today to consider stress testing.  With her rheumatoid arthritis, she does not feel that she would be able to do an exercise stress test.  She did do one about 4 years ago as previously mentioned it was entirely negative.  Elected to get her scheduled for a Lexiscan stress test in the near future.  I want her to continue aspirin therapy daily at least until then, it could probably be discontinued thereafter if her stress test is negative.  I spent nearly 2 years since she has had her cholesterol checked, I recommended that she get this rechecked at some point in time and she tells me that she will do that in [place].    The patient's insurance company declines to cover the above stress test.  They will cover CT angiogram or dobutamine stress echo.  Elected to proceed with CT angiogram at this  time.

## 2019-03-21 NOTE — NURSING NOTE
The patient is here today to be seen for a hospital follow up.  Nguyen Singh LPN on 3/21/2019 at 8:49 AM  Chief Complaint   Patient presents with     RECHECK       Initial /80 (BP Location: Right arm, Patient Position: Sitting, Cuff Size: Adult Large)   Pulse 72   Temp 97.6  F (36.4  C) (Tympanic)   Resp 16   Wt 72.6 kg (160 lb)   Breastfeeding? No   BMI 31.25 kg/m   Estimated body mass index is 31.25 kg/m  as calculated from the following:    Height as of 3/16/19: 1.524 m (5').    Weight as of this encounter: 72.6 kg (160 lb).  Medication Reconciliation: complete    Nguyen Singh LPN

## 2019-03-27 DIAGNOSIS — R07.9 CHEST PAIN, UNSPECIFIED TYPE: Primary | ICD-10-CM

## 2019-04-22 ENCOUNTER — HOSPITAL ENCOUNTER (OUTPATIENT)
Dept: CT IMAGING | Facility: OTHER | Age: 74
Discharge: HOME | End: 2019-04-22
Attending: INTERNAL MEDICINE | Admitting: INTERNAL MEDICINE
Payer: COMMERCIAL

## 2019-04-22 ENCOUNTER — TELEPHONE (OUTPATIENT)
Dept: INTERNAL MEDICINE | Facility: OTHER | Age: 74
End: 2019-04-22

## 2019-04-22 VITALS
SYSTOLIC BLOOD PRESSURE: 135 MMHG | OXYGEN SATURATION: 98 % | HEART RATE: 75 BPM | DIASTOLIC BLOOD PRESSURE: 91 MMHG | RESPIRATION RATE: 21 BRPM

## 2019-04-22 DIAGNOSIS — R07.9 CHEST PAIN, UNSPECIFIED TYPE: ICD-10-CM

## 2019-04-22 DIAGNOSIS — R07.9 CHEST PAIN, UNSPECIFIED TYPE: Primary | ICD-10-CM

## 2019-04-22 LAB
CREAT SERPL-MCNC: 0.71 MG/DL (ref 0.6–1.2)
GFR SERPL CREATININE-BSD FRML MDRD: 81 ML/MIN/{1.73_M2}

## 2019-04-22 PROCEDURE — 36415 COLL VENOUS BLD VENIPUNCTURE: CPT | Performed by: INTERNAL MEDICINE

## 2019-04-22 PROCEDURE — 82565 ASSAY OF CREATININE: CPT | Performed by: INTERNAL MEDICINE

## 2019-04-22 PROCEDURE — 25000132 ZZH RX MED GY IP 250 OP 250 PS 637: Performed by: RADIOLOGY

## 2019-04-22 PROCEDURE — 75571 CT HRT W/O DYE W/CA TEST: CPT | Mod: GZ

## 2019-04-22 RX ORDER — METOPROLOL TARTRATE 1 MG/ML
10 INJECTION, SOLUTION INTRAVENOUS
Status: DISCONTINUED | OUTPATIENT
Start: 2019-04-22 | End: 2019-04-23 | Stop reason: HOSPADM

## 2019-04-22 RX ORDER — NITROGLYCERIN 0.4 MG/1
0.4 TABLET SUBLINGUAL
Status: DISCONTINUED | OUTPATIENT
Start: 2019-04-22 | End: 2019-04-23 | Stop reason: HOSPADM

## 2019-04-22 RX ORDER — METOPROLOL TARTRATE 100 MG
100 TABLET ORAL
Status: COMPLETED | OUTPATIENT
Start: 2019-04-22 | End: 2019-04-22

## 2019-04-22 RX ADMIN — METOPROLOL TARTRATE 100 MG: 100 TABLET ORAL at 13:50

## 2019-04-22 NOTE — PROGRESS NOTES
13:45 Patient arrived for a CCTA. They were connected to a cardiac monitor and vital signs were obtained. The patient's heart rate was 75 . Medications and allergies were reviewed. The metoprolol was administered per procedure. The procedure was explained, and the patient was instructed to rest and relax, as much as possible. Patient handed off to Nadege Gomez RN with report given. The IV for medication administration was unable to be obtained by Anesthesia.  The patient was taken to CT for calcium score and patients heart rate was 58.  The test was completed and the patient was discharged in stable condition and report will be sent to Dr. Curry

## 2019-04-22 NOTE — TELEPHONE ENCOUNTER
The  came to nurses desk and reports that the patient is here to be drawn. Looks like they are having a CTA angiogram today. Please place lab orders if needed.  Nguyen Singh LPN on 4/22/2019 at 1:08 PM

## 2019-04-26 ENCOUNTER — TELEPHONE (OUTPATIENT)
Dept: FAMILY MEDICINE | Facility: OTHER | Age: 74
End: 2019-04-26

## 2019-04-26 NOTE — TELEPHONE ENCOUNTER
Received fax from [facility]. They are requesting refill of NITROGLYCERIN. Left message for patient to call back. Patients last prescription was sent to Bereket Montoya LPN on 4/26/2019 at 1:47 PM

## 2019-04-29 ENCOUNTER — OFFICE VISIT (OUTPATIENT)
Dept: INTERNAL MEDICINE | Facility: OTHER | Age: 74
End: 2019-04-29
Attending: INTERNAL MEDICINE
Payer: COMMERCIAL

## 2019-04-29 VITALS
DIASTOLIC BLOOD PRESSURE: 72 MMHG | HEART RATE: 84 BPM | SYSTOLIC BLOOD PRESSURE: 102 MMHG | WEIGHT: 160.8 LBS | RESPIRATION RATE: 18 BRPM | BODY MASS INDEX: 31.4 KG/M2

## 2019-04-29 DIAGNOSIS — I25.10 CORONARY ARTERY CALCIFICATION SEEN ON CAT SCAN: Primary | ICD-10-CM

## 2019-04-29 DIAGNOSIS — R07.9 CHEST PAIN, UNSPECIFIED TYPE: ICD-10-CM

## 2019-04-29 PROCEDURE — G0463 HOSPITAL OUTPT CLINIC VISIT: HCPCS

## 2019-04-29 PROCEDURE — 99213 OFFICE O/P EST LOW 20 MIN: CPT | Performed by: INTERNAL MEDICINE

## 2019-04-29 RX ORDER — ROSUVASTATIN CALCIUM 20 MG/1
20 TABLET, COATED ORAL DAILY
Qty: 90 TABLET | Refills: 3 | Status: SHIPPED | OUTPATIENT
Start: 2019-04-29 | End: 2020-03-26

## 2019-04-29 ASSESSMENT — ENCOUNTER SYMPTOMS
CONSTITUTIONAL NEGATIVE: 1
ENDOCRINE NEGATIVE: 1
EYES NEGATIVE: 1
ALLERGIC/IMMUNOLOGIC NEGATIVE: 1

## 2019-04-29 ASSESSMENT — PATIENT HEALTH QUESTIONNAIRE - PHQ9: SUM OF ALL RESPONSES TO PHQ QUESTIONS 1-9: 0

## 2019-04-29 NOTE — NURSING NOTE
The patient is here today to have a follow up to recent test results.  Nguyen Singh LPN on 4/29/2019 at 1:55 PM  Chief Complaint   Patient presents with     RECHECK       Initial /72 (BP Location: Right arm, Patient Position: Sitting, Cuff Size: Adult Regular)   Pulse 84   Resp 18   Wt 72.9 kg (160 lb 12.8 oz)   Breastfeeding? No   BMI 31.40 kg/m   Estimated body mass index is 31.4 kg/m  as calculated from the following:    Height as of 3/16/19: 1.524 m (5').    Weight as of this encounter: 72.9 kg (160 lb 12.8 oz).  Medication Reconciliation: complete    Nguyen Singh LPN

## 2019-04-29 NOTE — PROGRESS NOTES
Chief Complaint: Follow-up on heart concerns.    HPI: She is here today for a follow-up.  She was having chest pains that were suspicious for possible angina and I felt that further testing was warranted.  Because of her rheumatoid arthritis, she was not able to do a walking stress test.  I ordered a Lexiscan stress test which her insurance denied.  Therefore, I ordered a CT angiogram of her coronary arteries.  The patient has extremely poor venous access and despite numerous attempts by anesthesia, satisfactory venous access could not be obtained so only a CT calcium score was done.  This did show significant coronary artery calcification with a score of over 600.  I asked the patient to come in today to review this and to discuss where to go from here.  I also told her that she needed to be started on a cholesterol lowering medication, she is in agreement with that.    Past Medical History:   Diagnosis Date     Hyperlipidemia     No Comments Provided     Nicotine dependence, uncomplicated     No Comments Provided     Personal history of other medical treatment (CODE)      4, para 3.     Rheumatoid arthritis (H)        Past Surgical History:   Procedure Laterality Date     APPENDECTOMY OPEN      Incidental appendectomy at time of adhesion lysis.     COLONOSCOPY      06,Colonoscopy (screening), normal, Dr. Orellana     LAPAROSCOPIC TUBAL LIGATION           LAPAROTOMY EXPLORATORY      History of partial small bowel obstruction necessitating exploratory laparotomy.  Small area of adhesions with secondary lysis felt responsible for the partial obstruction.       No Known Allergies    Current Outpatient Medications   Medication Sig Dispense Refill     rosuvastatin (CRESTOR) 20 MG tablet Take 1 tablet (20 mg) by mouth daily 90 tablet 3     aspirin EC 81 MG EC tablet Take 81 mg by mouth daily with food       folic acid (FOLVITE) 1 MG tablet Take 1 mg by mouth daily       inFLIXimab Inject into the vein  once for 1 dose       methotrexate 2.5 MG tablet CHEMO Take 25 mg by mouth every 7 days       nitroGLYcerin (NITROSTAT) 0.4 MG sublingual tablet Place 1 tablet (0.4 mg) under the tongue every 5 minutes as needed for chest pain 20 tablet 0     predniSONE (DELTASONE) 5 MG tablet TAKE 1 TABLET EVERY DAY       sulfaSALAzine (AZULFIDINE) 500 MG tablet TAKE 2 TABLETS BY MOUTH EVERY MORNING AND 3 TABLETS EVERY EVENING         Review of Systems   Constitutional: Negative.    Eyes: Negative.    Endocrine: Negative.    Allergic/Immunologic: Negative.        Physical Exam   Constitutional: She appears well-developed and well-nourished. No distress.   Skin: She is not diaphoretic.   Nursing note and vitals reviewed.      Assessment:        ICD-10-CM    1. Coronary artery calcification seen on CAT scan I25.10 CARDIOLOGY EVAL ADULT REFERRAL     rosuvastatin (CRESTOR) 20 MG tablet   2. Chest pain, unspecified type R07.9 CARDIOLOGY EVAL ADULT REFERRAL       Plan: This patient has significant coronary artery calcifications associated with chest pains consistent with angina.  I think she probably should go directly to angiography at this point, given the fact that her insurance company has denied Lexiscan stress testing and the patient also has extremely poor venous access.  It is possible that cardiology may want to pursue a dobutamine stress echo or similar, I will defer to them.  Crestor 20 mg daily, warned of side effects.  I did review her CT scan results with her today.

## 2019-05-08 PROBLEM — R93.1 ELEVATED CORONARY ARTERY CALCIUM SCORE: Status: ACTIVE | Noted: 2019-05-08

## 2019-05-08 PROBLEM — E78.2 MIXED HYPERLIPIDEMIA: Status: ACTIVE | Noted: 2018-01-23

## 2019-05-08 PROBLEM — Z87.891 HISTORY OF TOBACCO ABUSE: Status: ACTIVE | Noted: 2019-05-08

## 2019-05-08 PROBLEM — Z79.899 ON STATIN THERAPY: Status: ACTIVE | Noted: 2019-05-08

## 2019-05-08 NOTE — PROGRESS NOTES
"[facility] HEART CARE   CARDIOLOGY CONSULT     Rebekah Shirley   1945  [id number]    No Ref-Primary, Physician     Chief Complaint   Patient presents with     Consult For     chest pain, coronary artery calcification          HPI:   Mrs. Shirley is a 73-year-old female who is being seen by cardiology for chest pressure.  She was identified as having an elevated calcium score at 606.7 on 4/22/2019.  She also has hyperlipidemia, history of tobacco abuse quitting 11/5/2008, RA, on statin therapy, dyspnea on exertion, fatigue, and atherosclerosis of her ascending aorta.    For an extended period of time at approximately 1 to 2 years, she has been having inconsistent chest tightness.  She is somewhat vague in the details but gives examples of cleaning her house, vacuuming, and doing housework.  When she has her symptoms, she is uncertain if she will die.  She would like to be around her grandkids.  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  She likes to \"complete the task until is done\".  With these activities, she describes a severe chest tightness to her left precordium with radiation to her jaw.  She has used nitro in the past with benefit.  She is unable to quantify the frequency of her symptoms.  With it, she describes herself as being fatigued, short of breath, mildly diaphoretic, and dizzy.  She denies nausea and vomiting.  She previously smoked quitting on 11/5/2008.  She describes herself as an infrequent smoker with 1 pack lasting a week.  If she was social with drinking, she may go through 2 packs a week.  She was exposed to secondhand smoke in her home.  She also has a history of hyperlipidemia which had been uncontrolled, RA, obesity, sedentary lifestyle, and poor diet.    She had a stress echo on 1/27/2016 which was negative for evidence for ischemia.  She was able to go 8.3 minutes and was without symptoms.  The EKG portion was normal.    IMAGING " RESULTS:   CORONARY CALCIUM SCORE on 2019:     Left Main:                            127.0  Left anterior descendin.9  Left Circumflex:                  11.4  Right coronary artery:        71.4     TOTAL:                               606.7     The estimated probability of a non-zero calcium score for a   Female of age 73 years is 62%. The observed calcium score is at 93  percentile for subjects of the same age, gender, and race/ethnicity  who are free of clinical cardiovascular disease and treated diabetes.    ALLERGIES:   No Known Allergies     PAST MEDICAL HISTORY:   Past Medical History:   Diagnosis Date     Hyperlipidemia     No Comments Provided     Nicotine dependence, uncomplicated     No Comments Provided     Personal history of other medical treatment (CODE)      4, para 3.     Rheumatoid arthritis (H)         PAST SURGICAL HISTORY:   Past Surgical History:   Procedure Laterality Date     APPENDECTOMY OPEN      Incidental appendectomy at time of adhesion lysis.     COLONOSCOPY      06,Colonoscopy (screening), normal, Dr. Orellana     LAPAROSCOPIC TUBAL LIGATION           LAPAROTOMY EXPLORATORY      History of partial small bowel obstruction necessitating exploratory laparotomy.  Small area of adhesions with secondary lysis felt responsible for the partial obstruction.        FAMILY HISTORY:   Family History   Problem Relation Age of Onset     Heart Disease Father 58        MI     Arthritis Mother      Hyperlipidemia Mother      Other - See Comments Sister         Occupational lung disease     Other - See Comments Brother         Laryngeal cancer     Heart Disease Sister         Surgery for heart        SOCIAL HISTORY:   Social History     Socioeconomic History     Marital status: Single     Spouse name:      Number of children: Not on file     Years of education: Not on file     Highest education level: Not on file   Occupational History     Not on file    Social Needs     Financial resource strain: Not on file     Food insecurity:     Worry: Not on file     Inability: Not on file     Transportation needs:     Medical: Not on file     Non-medical: Not on file   Tobacco Use     Smoking status: Former Smoker     Packs/day: 0.10     Last attempt to quit: 11/5/2008     Years since quitting: 10.5     Smokeless tobacco: Never Used   Substance and Sexual Activity     Alcohol use: No     Drug use: No     Sexual activity: Not Currently   Lifestyle     Physical activity:     Days per week: Not on file     Minutes per session: Not on file     Stress: Not on file   Relationships     Social connections:     Talks on phone: Not on file     Gets together: Not on file     Attends Hoahaoism service: Not on file     Active member of club or organization: Not on file     Attends meetings of clubs or organizations: Not on file     Relationship status: Not on file     Intimate partner violence:     Fear of current or ex partner: Not on file     Emotionally abused: Not on file     Physically abused: Not on file     Forced sexual activity: Not on file   Other Topics Concern     Parent/sibling w/ CABG, MI or angioplasty before 65F 55M? Not Asked   Social History Narrative    . Three sons. 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         CURRENT MEDICATIONS:   Prior to Admission medications    Medication Sig Start Date End Date Taking? Authorizing Provider   aspirin EC 81 MG EC tablet Take 81 mg by mouth daily with food 1/19/16   Reported, Patient   folic acid (FOLVITE) 1 MG tablet Take 1 mg by mouth daily    Reported, Patient   inFLIXimab Inject into the vein once for 1 dose 3/21/19   Efrain Curry MD   methotrexate 2.5 MG tablet CHEMO Take 25 mg by mouth every 7 days 1/17/17   Reported, Patient   nitroGLYcerin (NITROSTAT) 0.4 MG sublingual tablet Place 1 tablet (0.4 mg) under the tongue every 5 minutes as needed for chest pain 1/7/19   Pelon Wright MD    predniSONE (DELTASONE) 5 MG tablet TAKE 1 TABLET EVERY DAY 1/10/19   Reported, Patient   rosuvastatin (CRESTOR) 20 MG tablet Take 1 tablet (20 mg) by mouth daily 4/29/19   Efrain Curry MD   sulfaSALAzine (AZULFIDINE) 500 MG tablet TAKE 2 TABLETS BY MOUTH EVERY MORNING AND 3 TABLETS EVERY EVENING 2/16/16   Reported, Patient          ROS:   CONSTITUTIONAL: No weight loss, fever, chills, weakness but admits to fatigue.   HEENT: Eyes: No visual changes. Ears, Nose, Throat: No hearing loss, congestion or difficulty swallowing.   CARDIOVASCULAR: (+) chest pain with chest pressure and chest discomfort. No palpitations or lower extremity edema.   RESPIRATORY: (+) shortness of breath with dyspnea upon exertion, but no cough or sputum production.   GASTROINTESTINAL: No abdominal pain. No anorexia, nausea, vomiting or diarrhea.   NEUROLOGICAL: No headache, lightheadedness, dizziness, syncope, ataxia or weakness.   HEMATOLOGIC: No anemia, bleeding or bruising.   PSYCHIATRIC: (+) history of depression or anxiety.   ENDOCRINOLOGIC: No reports of sweating, cold or heat intolerance. No polyuria or polydipsia.   SKIN: No abnormal rashes or itching.       PHYSICAL EXAM:   GENERAL: The patient is a well-developed, well-nourished, in no apparent distress. Alert and oriented x3.   HEENT: Head is normocephalic and atraumatic. Eyes are symmetrical with normal visual tracking.  HEART: Regular rate and rhythm, S1S2 present without murmur, rub or gallop.   LUNGS: Respirations regular and unlabored. Clear to auscultation.  But with reduced breath sounds  GI: Abdomen is soft and nondistended.   EXTREMITIES: Scant peripheral edema present.   MUSCULOSKELETAL: No joint swelling.   NEUROLOGIC: Alert and oriented X3.    SKIN: No jaundice. No rashes or visible skin lesions present.         LAB RESULTS:   Orders Only on 04/22/2019   Component Date Value Ref Range Status     Creatinine 04/22/2019 0.71  0.60 - 1.20 mg/dL Final     GFR Estimate  04/22/2019 81  >60 mL/min/[1.73_m2] Final     GFR Estimate If Black 04/22/2019 >90  >60 mL/min/[1.73_m2] Final            ASSESSMENT:       ICD-10-CM    1. Chest pain, unspecified type R07.9 EKG 12-lead, tracing only (Same Day)     Case Request Cath Lab: Coronary Angiogram     aspirin (ASA) 81 MG chewable tablet     Echocardiogram Complete   2. Elevated coronary artery calcium score on 4/22/2019 at 606.7  R93.1 Case Request Cath Lab: Coronary Angiogram   3. Mixed hyperlipidemia E78.2    4. History of tobacco abuse on 11/5/2008 Z87.891    5. Rheumatoid arthritis involving both hands with positive rheumatoid factor (H) M05.741     M05.742    6. On statin therapy Z79.899    7. Encounter for abdominal aortic aneurysm screening Z13.6 US Aorta Medicare AAA Screening   8. Bruit R09.89 US Carotid Bilateral   9. Dyspnea on exertion R06.09 Pulmonary Function Test     Echocardiogram Complete   10. History of tobacco abuse quitting on 11/5/2008 Z87.891 Pulmonary Function Test   11. Aortic atherosclerosis (H) I70.0          PLAN:   1.  Insurance would not cover a nuclear stress test.  She was attempted to have a CTA of her coronary arteries but were not able to obtain IV access.  She had a CT scan for calcium which was severely elevated at 606.7 on 4/22/2019.  She describes exertional chest tightness with risk factors as described above.  It was suggested she have an angiogram through the [facility] which she was agreeable to.  2.  She has ongoing dyspnea on exertion.  She has a history of tobacco abuse quitting on 11/5/2008.  It was suggest that she have PFTs to look for COPD.  3.  With a history of tobacco abuse, she will have an ultrasound of her carotids to look for carotid artery stenosis and ultrasound of her abdomen looking for abdominal aortic aneurysm.  4.  Related to chest pain and shortness of breath, she will have an echocardiogram.  5.  She is to take an 81 mg chewable aspirin daily.  6.  She will  continue on sublingual nitro as needed for chest pain and Crestor 20 mg in the evening.  7.  She will be seen in follow-up upon completion of her testing.        Thank you for allowing me to participate in the care of your patient. Please do not hesitate to contact me if you have any questions.     Pelon Ray, DO

## 2019-05-09 ENCOUNTER — TELEPHONE (OUTPATIENT)
Dept: CARDIOLOGY | Facility: OTHER | Age: 74
End: 2019-05-09

## 2019-05-09 ENCOUNTER — OFFICE VISIT (OUTPATIENT)
Dept: CARDIOLOGY | Facility: OTHER | Age: 74
End: 2019-05-09
Attending: INTERNAL MEDICINE
Payer: COMMERCIAL

## 2019-05-09 ENCOUNTER — HOSPITAL ENCOUNTER (OUTPATIENT)
Facility: CLINIC | Age: 74
End: 2019-05-09
Attending: INTERNAL MEDICINE | Admitting: INTERNAL MEDICINE
Payer: COMMERCIAL

## 2019-05-09 VITALS
DIASTOLIC BLOOD PRESSURE: 80 MMHG | HEIGHT: 59 IN | TEMPERATURE: 97.5 F | BODY MASS INDEX: 32.05 KG/M2 | RESPIRATION RATE: 18 BRPM | WEIGHT: 159 LBS | HEART RATE: 76 BPM | SYSTOLIC BLOOD PRESSURE: 136 MMHG

## 2019-05-09 DIAGNOSIS — R07.9 CHEST PAIN, UNSPECIFIED TYPE: Primary | ICD-10-CM

## 2019-05-09 DIAGNOSIS — I70.0 AORTIC ATHEROSCLEROSIS (H): ICD-10-CM

## 2019-05-09 DIAGNOSIS — R06.09 DYSPNEA ON EXERTION: ICD-10-CM

## 2019-05-09 DIAGNOSIS — Z87.891 HISTORY OF TOBACCO ABUSE: ICD-10-CM

## 2019-05-09 DIAGNOSIS — Z13.6 ENCOUNTER FOR ABDOMINAL AORTIC ANEURYSM SCREENING: ICD-10-CM

## 2019-05-09 DIAGNOSIS — R93.1 ELEVATED CORONARY ARTERY CALCIUM SCORE: ICD-10-CM

## 2019-05-09 DIAGNOSIS — R09.89 BRUIT: ICD-10-CM

## 2019-05-09 DIAGNOSIS — E78.2 MIXED HYPERLIPIDEMIA: ICD-10-CM

## 2019-05-09 DIAGNOSIS — M05.742 RHEUMATOID ARTHRITIS INVOLVING BOTH HANDS WITH POSITIVE RHEUMATOID FACTOR (H): ICD-10-CM

## 2019-05-09 DIAGNOSIS — Z79.899 ON STATIN THERAPY: ICD-10-CM

## 2019-05-09 DIAGNOSIS — R07.9 CHEST PAIN, UNSPECIFIED TYPE: ICD-10-CM

## 2019-05-09 DIAGNOSIS — M05.741 RHEUMATOID ARTHRITIS INVOLVING BOTH HANDS WITH POSITIVE RHEUMATOID FACTOR (H): ICD-10-CM

## 2019-05-09 PROCEDURE — G0463 HOSPITAL OUTPT CLINIC VISIT: HCPCS

## 2019-05-09 PROCEDURE — 99205 OFFICE O/P NEW HI 60 MIN: CPT | Performed by: INTERNAL MEDICINE

## 2019-05-09 PROCEDURE — 93010 ELECTROCARDIOGRAM REPORT: CPT | Performed by: INTERNAL MEDICINE

## 2019-05-09 PROCEDURE — 93005 ELECTROCARDIOGRAM TRACING: CPT | Performed by: INTERNAL MEDICINE

## 2019-05-09 PROCEDURE — G0463 HOSPITAL OUTPT CLINIC VISIT: HCPCS | Mod: 25

## 2019-05-09 RX ORDER — ASPIRIN 81 MG/1
81 TABLET, CHEWABLE ORAL DAILY
Qty: 90 TABLET | Refills: 3 | Status: SHIPPED | OUTPATIENT
Start: 2019-05-09 | End: 2020-12-22

## 2019-05-09 ASSESSMENT — ANXIETY QUESTIONNAIRES
6. BECOMING EASILY ANNOYED OR IRRITABLE: SEVERAL DAYS
2. NOT BEING ABLE TO STOP OR CONTROL WORRYING: NOT AT ALL
7. FEELING AFRAID AS IF SOMETHING AWFUL MIGHT HAPPEN: NOT AT ALL
1. FEELING NERVOUS, ANXIOUS, OR ON EDGE: NOT AT ALL
5. BEING SO RESTLESS THAT IT IS HARD TO SIT STILL: NOT AT ALL
3. WORRYING TOO MUCH ABOUT DIFFERENT THINGS: NOT AT ALL
GAD7 TOTAL SCORE: 1
IF YOU CHECKED OFF ANY PROBLEMS ON THIS QUESTIONNAIRE, HOW DIFFICULT HAVE THESE PROBLEMS MADE IT FOR YOU TO DO YOUR WORK, TAKE CARE OF THINGS AT HOME, OR GET ALONG WITH OTHER PEOPLE: NOT DIFFICULT AT ALL

## 2019-05-09 ASSESSMENT — PATIENT HEALTH QUESTIONNAIRE - PHQ9: 5. POOR APPETITE OR OVEREATING: NOT AT ALL

## 2019-05-09 ASSESSMENT — PAIN SCALES - GENERAL: PAINLEVEL: MODERATE PAIN (5)

## 2019-05-09 ASSESSMENT — MIFFLIN-ST. JEOR: SCORE: 1142.72

## 2019-05-09 NOTE — TELEPHONE ENCOUNTER
Per DWB, pt referred for CORS Angiogram at [facility]. Chart reviewed.     -Pt will begin ASA 81 mg daily, even the day of procedure.     -No med holds. Will take all other medications as prescribed.     Instructions given, questions answered. Pt now scheduled for June 10th with an arrival time of 1100 for a 1300 procedure per pt request. Scheduled for f/u with DWB 06/18. Pt verbalizes understanding and is agreeable to plan. Cynthai Barry RN on 5/9/2019 at 11:06 AM

## 2019-05-09 NOTE — NURSING NOTE
"Patient comes in for new consult on chest pain and coronary artery calcification.  Rosio Hussein LPN ....................5/9/2019   9:25 AM  Chief Complaint   Patient presents with     Consult For     chest pain, coronary artery calcification       Initial /80 (BP Location: Right arm, Patient Position: Sitting, Cuff Size: Adult Regular)   Pulse 76   Temp 97.5  F (36.4  C) (Tympanic)   Resp 18   Ht 1.5 m (4' 11.06\")   Wt 72.1 kg (159 lb)   BMI 32.05 kg/m   Estimated body mass index is 32.05 kg/m  as calculated from the following:    Height as of this encounter: 1.5 m (4' 11.06\").    Weight as of this encounter: 72.1 kg (159 lb).  Meds Reconciled: complete  Pt is not on Aspirin  Pt is on a Statin  PHQ and/or KAYCEE reviewed. Pt referred to PCP/MH Provider as appropriate.    Rosio Hussein LPN      "

## 2019-05-09 NOTE — PATIENT INSTRUCTIONS
You were seen by  Pelon Ray, DO      1. Angiogram at [facility]. Refer to packet for instructions, date, and time.      2. Pulmonary Function Testing, an Ultrasound of your Carotid arteries in your neck, and an Ultrasound of your abdomen here at [facility]. You will be called to schedule these tests.     3. No other changes.      You will follow up with [facility]iology 1-2 weeks after angiogram is completed, sooner if needed.       Please call the cardiology office with problems, questions, or concerns at [phone].    If you experience chest pain, chest pressure, chest tightness, shortness of breath, fainting, lightheadedness, nausea, vomiting, or other concerning symptoms, please report to the Emergency Department or call 911. These symptoms may be emergent, and best treated in the Emergency Department.     AUTUMN ShoreN, RN  [facility]  [phone]

## 2019-05-10 ASSESSMENT — ANXIETY QUESTIONNAIRES: GAD7 TOTAL SCORE: 1

## 2019-05-13 ENCOUNTER — TELEPHONE (OUTPATIENT)
Dept: CARDIOLOGY | Facility: OTHER | Age: 74
End: 2019-05-13

## 2019-05-13 DIAGNOSIS — R07.9 CHEST PAIN, UNSPECIFIED TYPE: Primary | ICD-10-CM

## 2019-05-13 DIAGNOSIS — R93.1 ELEVATED CORONARY ARTERY CALCIUM SCORE: ICD-10-CM

## 2019-05-13 NOTE — TELEPHONE ENCOUNTER
Call back to pt who states she is unable to make the trip to [facility] for scheduled angiogram on 06/10/2019. Pt requests referral to [facility] instead. After much discussion, pt verbalizes understanding that she may have to make multiple trips to [place], that [facility] has no association to [facility], and that she may not be able to have procedure as soon as 06/10/2019. Pt verbalizes understanding. DWB notified. Referral to [facility] ordered VORB from DWB. Cath lab scheduling notified of cancellation as well. Cynthia Barry RN on 5/13/2019 at 4:13 PM

## 2019-05-16 NOTE — TELEPHONE ENCOUNTER
Call received from [facility], pt to have pre-procedure labs (CBC and BMP) here at [facility]. Orders placed per protocol. Cynthia Barry RN on 5/16/2019 at 3:34 PM

## 2019-05-22 ENCOUNTER — HOSPITAL ENCOUNTER (OUTPATIENT)
Dept: ULTRASOUND IMAGING | Facility: OTHER | Age: 74
End: 2019-05-22
Attending: INTERNAL MEDICINE
Payer: COMMERCIAL

## 2019-05-22 ENCOUNTER — HOSPITAL ENCOUNTER (OUTPATIENT)
Dept: ULTRASOUND IMAGING | Facility: OTHER | Age: 74
Discharge: HOME | End: 2019-05-22
Attending: INTERNAL MEDICINE | Admitting: INTERNAL MEDICINE
Payer: COMMERCIAL

## 2019-05-22 DIAGNOSIS — R09.89 BRUIT: ICD-10-CM

## 2019-05-22 DIAGNOSIS — R93.1 ELEVATED CORONARY ARTERY CALCIUM SCORE: ICD-10-CM

## 2019-05-22 DIAGNOSIS — Z13.6 ENCOUNTER FOR ABDOMINAL AORTIC ANEURYSM SCREENING: ICD-10-CM

## 2019-05-22 DIAGNOSIS — R07.9 CHEST PAIN, UNSPECIFIED TYPE: ICD-10-CM

## 2019-05-22 LAB
ANION GAP SERPL CALCULATED.3IONS-SCNC: 10 MMOL/L (ref 3–14)
BUN SERPL-MCNC: 13 MG/DL (ref 7–25)
CALCIUM SERPL-MCNC: 8.9 MG/DL (ref 8.6–10.3)
CHLORIDE SERPL-SCNC: 104 MMOL/L (ref 98–107)
CO2 SERPL-SCNC: 23 MMOL/L (ref 21–31)
CREAT SERPL-MCNC: 0.71 MG/DL (ref 0.6–1.2)
ERYTHROCYTE [DISTWIDTH] IN BLOOD BY AUTOMATED COUNT: 12.9 % (ref 10–15)
GFR SERPL CREATININE-BSD FRML MDRD: 81 ML/MIN/{1.73_M2}
GLUCOSE SERPL-MCNC: 91 MG/DL (ref 70–105)
HCT VFR BLD AUTO: 36.5 % (ref 35–47)
HGB BLD-MCNC: 12.1 G/DL (ref 11.7–15.7)
MCH RBC QN AUTO: 31.3 PG (ref 26.5–33)
MCHC RBC AUTO-ENTMCNC: 33.2 G/DL (ref 31.5–36.5)
MCV RBC AUTO: 94 FL (ref 78–100)
PLATELET # BLD AUTO: 306 10E9/L (ref 150–450)
POTASSIUM SERPL-SCNC: 4.1 MMOL/L (ref 3.5–5.1)
RBC # BLD AUTO: 3.87 10E12/L (ref 3.8–5.2)
SODIUM SERPL-SCNC: 137 MMOL/L (ref 134–144)
WBC # BLD AUTO: 6.8 10E9/L (ref 4–11)

## 2019-05-22 PROCEDURE — 80048 BASIC METABOLIC PNL TOTAL CA: CPT | Performed by: INTERNAL MEDICINE

## 2019-05-22 PROCEDURE — 93880 EXTRACRANIAL BILAT STUDY: CPT

## 2019-05-22 PROCEDURE — 85027 COMPLETE CBC AUTOMATED: CPT | Performed by: INTERNAL MEDICINE

## 2019-05-22 PROCEDURE — 36415 COLL VENOUS BLD VENIPUNCTURE: CPT | Performed by: INTERNAL MEDICINE

## 2019-05-22 PROCEDURE — 76706 US ABDL AORTA SCREEN AAA: CPT

## 2019-06-07 ENCOUNTER — TELEPHONE (OUTPATIENT)
Dept: CARDIOLOGY | Facility: OTHER | Age: 74
End: 2019-06-07

## 2019-06-07 ENCOUNTER — TRANSFERRED RECORDS (OUTPATIENT)
Dept: HEALTH INFORMATION MANAGEMENT | Facility: OTHER | Age: 74
End: 2019-06-07

## 2019-06-07 NOTE — TELEPHONE ENCOUNTER
Attempt made to call back Dr. Richey. No answer/busy. Chose to not leave a message as phone nurse is unsure about Dr. Richey, his connection to patient, and the need for him to know patient's PHI. Will attempt to call back later.       Carina Bear on 6/7/2019 at 11:29 AM

## 2019-06-10 NOTE — TELEPHONE ENCOUNTER
Call back received from Dr. Caicedo. He is questioning why pt had 2 separate angiograms requested for authorization. Advised that the first referral was for [facility], however, pt could not arrange transportation to [place] for this. Therefore, she is being referred to [facility] for angiogram instead. Dr. Caicedo verbalizes understanding. He does report the CPT code entered was [place] [address], he will change this. He denies further need at this time. Cynthia Barry RN on 6/10/2019 at 9:03 AM

## 2019-06-17 ENCOUNTER — HOSPITAL ENCOUNTER (OUTPATIENT)
Dept: RESPIRATORY THERAPY | Facility: OTHER | Age: 74
Discharge: HOME | End: 2019-06-17
Attending: INTERNAL MEDICINE | Admitting: INTERNAL MEDICINE
Payer: COMMERCIAL

## 2019-06-17 DIAGNOSIS — R06.09 DYSPNEA ON EXERTION: ICD-10-CM

## 2019-06-17 PROCEDURE — 94010 BREATHING CAPACITY TEST: CPT

## 2019-06-17 PROCEDURE — 40000275 ZZH STATISTIC RCP TIME EA 10 MIN

## 2019-06-17 PROCEDURE — 94729 DIFFUSING CAPACITY: CPT

## 2019-06-17 PROCEDURE — 94729 DIFFUSING CAPACITY: CPT | Mod: 26 | Performed by: INTERNAL MEDICINE

## 2019-06-17 PROCEDURE — 94010 BREATHING CAPACITY TEST: CPT | Mod: 26 | Performed by: INTERNAL MEDICINE

## 2019-06-17 PROCEDURE — 94726 PLETHYSMOGRAPHY LUNG VOLUMES: CPT

## 2019-06-17 PROCEDURE — 94726 PLETHYSMOGRAPHY LUNG VOLUMES: CPT | Mod: 26 | Performed by: INTERNAL MEDICINE

## 2019-06-25 ENCOUNTER — OFFICE VISIT (OUTPATIENT)
Dept: CARDIOLOGY | Facility: OTHER | Age: 74
End: 2019-06-25
Attending: INTERNAL MEDICINE
Payer: COMMERCIAL

## 2019-06-25 VITALS
DIASTOLIC BLOOD PRESSURE: 60 MMHG | WEIGHT: 160 LBS | RESPIRATION RATE: 16 BRPM | HEART RATE: 64 BPM | TEMPERATURE: 96.9 F | BODY MASS INDEX: 32.26 KG/M2 | SYSTOLIC BLOOD PRESSURE: 110 MMHG

## 2019-06-25 DIAGNOSIS — R93.1 ELEVATED CORONARY ARTERY CALCIUM SCORE: ICD-10-CM

## 2019-06-25 DIAGNOSIS — Z87.891 HISTORY OF TOBACCO ABUSE: ICD-10-CM

## 2019-06-25 DIAGNOSIS — R07.9 CHEST PAIN, UNSPECIFIED TYPE: ICD-10-CM

## 2019-06-25 DIAGNOSIS — R06.09 DYSPNEA ON EXERTION: Primary | ICD-10-CM

## 2019-06-25 DIAGNOSIS — M05.742 RHEUMATOID ARTHRITIS INVOLVING BOTH HANDS WITH POSITIVE RHEUMATOID FACTOR (H): ICD-10-CM

## 2019-06-25 DIAGNOSIS — I25.10 NON-OCCLUSIVE CORONARY ARTERY DISEASE: ICD-10-CM

## 2019-06-25 DIAGNOSIS — Z79.899 ON STATIN THERAPY: ICD-10-CM

## 2019-06-25 DIAGNOSIS — R73.9 HYPERGLYCEMIA: ICD-10-CM

## 2019-06-25 DIAGNOSIS — M05.741 RHEUMATOID ARTHRITIS INVOLVING BOTH HANDS WITH POSITIVE RHEUMATOID FACTOR (H): ICD-10-CM

## 2019-06-25 DIAGNOSIS — I70.0 AORTIC ATHEROSCLEROSIS (H): ICD-10-CM

## 2019-06-25 DIAGNOSIS — E78.2 MIXED HYPERLIPIDEMIA: ICD-10-CM

## 2019-06-25 DIAGNOSIS — Z98.890 HX OF CARDIAC CATHETERIZATION: ICD-10-CM

## 2019-06-25 DIAGNOSIS — R53.83 OTHER FATIGUE: ICD-10-CM

## 2019-06-25 LAB
ALBUMIN SERPL-MCNC: 4 G/DL (ref 3.5–5.7)
ALP SERPL-CCNC: 71 U/L (ref 34–104)
ALT SERPL W P-5'-P-CCNC: 14 U/L (ref 7–52)
ANION GAP SERPL CALCULATED.3IONS-SCNC: 7 MMOL/L (ref 3–14)
AST SERPL W P-5'-P-CCNC: 16 U/L (ref 13–39)
BILIRUB SERPL-MCNC: 0.3 MG/DL (ref 0.3–1)
BUN SERPL-MCNC: 14 MG/DL (ref 7–25)
CALCIUM SERPL-MCNC: 8.9 MG/DL (ref 8.6–10.3)
CHLORIDE SERPL-SCNC: 106 MMOL/L (ref 98–107)
CHOLEST SERPL-MCNC: 154 MG/DL
CO2 SERPL-SCNC: 24 MMOL/L (ref 21–31)
CORTIS SERPL-MCNC: 10 UG/DL (ref 4–22)
CREAT SERPL-MCNC: 0.64 MG/DL (ref 0.6–1.2)
CRP SERPL-MCNC: 0.2 MG/L
D DIMER PPP DDU-MCNC: 231 NG/ML D-DU (ref 0–230)
ERYTHROCYTE [DISTWIDTH] IN BLOOD BY AUTOMATED COUNT: 13.3 % (ref 10–15)
GFR SERPL CREATININE-BSD FRML MDRD: >90 ML/MIN/{1.73_M2}
GLUCOSE SERPL-MCNC: 99 MG/DL (ref 70–105)
HBA1C MFR BLD: 5.3 % (ref 4–6)
HCT VFR BLD AUTO: 36.2 % (ref 35–47)
HDLC SERPL-MCNC: 58 MG/DL (ref 23–92)
HETEROPH AB SER QL: NEGATIVE
HGB BLD-MCNC: 12 G/DL (ref 11.7–15.7)
LDLC SERPL CALC-MCNC: 68 MG/DL
MCH RBC QN AUTO: 31.4 PG (ref 26.5–33)
MCHC RBC AUTO-ENTMCNC: 33.1 G/DL (ref 31.5–36.5)
MCV RBC AUTO: 95 FL (ref 78–100)
NONHDLC SERPL-MCNC: 96 MG/DL
PLATELET # BLD AUTO: 276 10E9/L (ref 150–450)
POTASSIUM SERPL-SCNC: 4.1 MMOL/L (ref 3.5–5.1)
PROT SERPL-MCNC: 7.4 G/DL (ref 6.4–8.9)
RBC # BLD AUTO: 3.82 10E12/L (ref 3.8–5.2)
RHEUMATOID FACT SER NEPH-ACNC: 94 IU/ML (ref 0–20)
SODIUM SERPL-SCNC: 137 MMOL/L (ref 134–144)
TRIGL SERPL-MCNC: 138 MG/DL
TSH SERPL DL<=0.05 MIU/L-ACNC: 1.44 IU/ML (ref 0.34–5.6)
WBC # BLD AUTO: 8.1 10E9/L (ref 4–11)

## 2019-06-25 PROCEDURE — 86308 HETEROPHILE ANTIBODY SCREEN: CPT | Mod: ZL | Performed by: INTERNAL MEDICINE

## 2019-06-25 PROCEDURE — 36415 COLL VENOUS BLD VENIPUNCTURE: CPT | Mod: ZL | Performed by: INTERNAL MEDICINE

## 2019-06-25 PROCEDURE — 86431 RHEUMATOID FACTOR QUANT: CPT | Mod: ZL | Performed by: INTERNAL MEDICINE

## 2019-06-25 PROCEDURE — 80053 COMPREHEN METABOLIC PANEL: CPT | Mod: ZL | Performed by: INTERNAL MEDICINE

## 2019-06-25 PROCEDURE — 99214 OFFICE O/P EST MOD 30 MIN: CPT | Performed by: INTERNAL MEDICINE

## 2019-06-25 PROCEDURE — 82533 TOTAL CORTISOL: CPT | Mod: ZL | Performed by: INTERNAL MEDICINE

## 2019-06-25 PROCEDURE — 83036 HEMOGLOBIN GLYCOSYLATED A1C: CPT | Mod: ZL | Performed by: INTERNAL MEDICINE

## 2019-06-25 PROCEDURE — 80061 LIPID PANEL: CPT | Mod: ZL | Performed by: INTERNAL MEDICINE

## 2019-06-25 PROCEDURE — 85027 COMPLETE CBC AUTOMATED: CPT | Mod: ZL | Performed by: INTERNAL MEDICINE

## 2019-06-25 PROCEDURE — 85379 FIBRIN DEGRADATION QUANT: CPT | Mod: ZL | Performed by: INTERNAL MEDICINE

## 2019-06-25 PROCEDURE — 86140 C-REACTIVE PROTEIN: CPT | Mod: ZL | Performed by: INTERNAL MEDICINE

## 2019-06-25 PROCEDURE — G0463 HOSPITAL OUTPT CLINIC VISIT: HCPCS

## 2019-06-25 PROCEDURE — 84443 ASSAY THYROID STIM HORMONE: CPT | Mod: ZL | Performed by: INTERNAL MEDICINE

## 2019-06-25 PROCEDURE — 86038 ANTINUCLEAR ANTIBODIES: CPT | Mod: ZL | Performed by: INTERNAL MEDICINE

## 2019-06-25 PROCEDURE — 87389 HIV-1 AG W/HIV-1&-2 AB AG IA: CPT | Mod: ZL | Performed by: INTERNAL MEDICINE

## 2019-06-25 ASSESSMENT — PAIN SCALES - GENERAL: PAINLEVEL: MODERATE PAIN (5)

## 2019-06-25 NOTE — PATIENT INSTRUCTIONS
You were seen by  Pelon Ray, DO      1. A referral will be placed for a sleep study and consult with Dr. Gonzalez. You will be called to schedule this here at [facility].      2. An Echocardiogram and a CT of your chest have been ordered. You will be called to schedule these here at [facility]. You will receive instructions for testing at that time.  We will call with results.     3. No other changes.     You will follow up with [facility] in 3 months, sooner if needed.       Please call the cardiology office with problems, questions, or concerns at [phone].    If you experience chest pain, chest pressure, chest tightness, shortness of breath, fainting, lightheadedness, nausea, vomiting, or other concerning symptoms, please report to the Emergency Department or call 911. These symptoms may be emergent, and best treated in the Emergency Department.     AUTUMN ShoreN, RN  [facility]  [phone]

## 2019-06-25 NOTE — PROGRESS NOTES
"    Cardiology Progress Note     Assessment & Plan   Rebekah Shirley is a 74 year old female who is being seen in follow-up to visit on 5/9/2019.  She was seen by cardiology secondary to chest pressure.  She had a calcium score of 606.7 on 4/22/2019.  She has cardiac risk factors which include hyperlipidemia, history of tobacco abuse quitting on 11/5/2008, RA, dyspnea on exertion, and atherosclerosis of her ascending aorta.  Based on her history, symptoms, and testing it was suggest that she have an angiogram.  Originally, this was set at the [facility].  However, this was changed to [facility] secondary to transportation issues.  Her cath was completed in 6/2019.  I do not have her cardiac catheterization report but she reports not having any stents placed.  They told her \"to follow-up in 2 years\".  I am not sure what this means and we are working getting the report.    She had an ultrasound of her abdominal aorta on 5/22/2019 without evidence for aneurysm.  She also had ultrasound of her carotids on 5/22/2019 without significant carotid artery disease. She was scheduled for an echocardiogram on 5/9/2019 but was not completed.    She continues to complain of dyspnea on exertion and fatigue.  She is uncertain if she snores at night as she does not have a bed partner.  She was referred for sleep study on 6/25/2019.  We suggested doing routine labs and assessment for fatigue and shortness of breath which she was agreeable.  It was suggest that she complete her echocardiogram.  It was also suggested that she have a CT scan of her chest as she has a history of rheumatoid arthritis.    For approximately 1 to 2 years, she has been having inconsistent chest tightness.  She is somewhat vague in the details but gives examples of cleaning her house, vacuuming, and doing housework.  When she has her symptoms, she is uncertain if she will die.  She would like to be around her grandkids.  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  She likes to \"complete the task until is done\".  With these activities, she describes a severe chest tightness to her left precordium with radiation to her jaw. She has used nitro in the past with benefit.  She is unable to quantify the frequency of her symptoms.  With it, she describes herself as being fatigued, short of breath, mildly diaphoretic, and dizzy.  She denies nausea and vomiting.  She previously smoked quitting on 11/5/2008.  She describes herself as an infrequent smoker with 1 pack lasting a week.  If she was social with drinking, she may go through 2 packs a week.  She was exposed to secondhand smoke in her home.  She also has a history of hyperlipidemia which had been uncontrolled, RA, obesity, sedentary lifestyle, and poor diet.     She had a stress echo on 1/27/2016 which was negative for evidence for ischemia.  She was able to go 8.3 minutes and was without symptoms.  The EKG portion was normal.    Impression:  1.  Dyspnea on exertion of unknown etiology with work-up on 6/25/2019.  2.  Fatigue-unknown etiology.  3.  Noncardiac chest pain.  4.  Hyperlipidemia.  5.  S/P cardiac catheterization through [facility] in June, 2019 without stenting or severe disease, report pending.  6.  On statin therapy.  7.  History of tobacco abuse quitting 11/5/2008.  8.  RA.  9.  Elevated calcium score on 4/22/2019 at 606.7.  10.  Aortic atherosclerosis.    Plan:  1.  She has ongoing concerns for fatigue and dyspnea.  Her chest pain has improved.  It was suggested that she have a work-up as outlined below.  2.  She will be scheduled to follow-up with sleep medicine with concern for PATRICIA.  She does not have a bed partner and is uncertain if she snores at night.  3.  She will complete her echo as this was not done previously.  It was ordered on 5/9/2019.  It is possible that she has heart failure which is causing her fatigue/shortness of " breath.  4.  She will have labs which will include lipid panel, TSH, CMP, d-dimer, HIV, JONATHAN, RF, CRP, CBC, A1c, cortisol, and mono screen in hopes to identify her shortness of breath/fatigue.  5.  She will do a CT scan of her chest with history of rheumatoid arthritis to look for rheumatoid lung or other etiology for shortness of breath.  6.  He will be seen in 3 months follow-up or sooner if the results as described above are abnormal.    Pelon Ray        Physical Exam   Temp: 96.9  F (36.1  C) Temp src: Tympanic BP: 110/60 Pulse: 64   Resp: 16        Vitals:    06/25/19 0957   Weight: 72.6 kg (160 lb)     Vital Signs with Ranges  Temp:  [96.9  F (36.1  C)] 96.9  F (36.1  C)  Pulse:  [64] 64  Resp:  [16] 16  BP: (110)/(60) 110/60  ROS negative except that which was noted in the HPI.         Constitutional: awake, alert, cooperative, no apparent distress, and appears stated age  Eyes: Lids and lashes normal, pupils equal, sclera clear, conjunctiva normal  ENT: Normocephalic, without obvious abnormality, atraumatic.  Respiratory: No increased work of breathing, good air exchange, clear to auscultation bilaterally, no crackles or wheezing  Cardiovascular: Normal apical impulse, regular rate and rhythm, normal S1 and S2, no S3 or S4, and no murmur noted  Musculoskeletal: no lower extremity pitting edema present  Neurologic: Awake, alert, oriented to name, place and time.   Neuropsychiatric: General: normal, calm and normal eye contact    Medications         Data   No results found for this or any previous visit (from the past 24 hour(s)).  No results found for this or any previous visit (from the past 24 hour(s)).

## 2019-06-25 NOTE — NURSING NOTE
"Patient comes in for follow up after angiogram in [place].  Rosio Hussein LPN ....................6/25/2019   10:00 AM  Chief Complaint   Patient presents with     RECHECK     F/u angiogram       Initial /60 (BP Location: Right arm, Patient Position: Sitting, Cuff Size: Adult Large)   Pulse 64   Temp 96.9  F (36.1  C) (Tympanic)   Resp 16   Wt 72.6 kg (160 lb)   BMI 32.26 kg/m   Estimated body mass index is 32.26 kg/m  as calculated from the following:    Height as of 5/9/19: 1.5 m (4' 11.06\").    Weight as of this encounter: 72.6 kg (160 lb).  Meds Reconciled: complete  Pt is on Aspirin  Pt is on a Statin  PHQ and/or KAYCEE reviewed. Pt referred to PCP/MH Provider as appropriate.    Rosio Hussein LPN      "

## 2019-06-26 LAB
ANA SER QL IF: NEGATIVE
HIV 1+2 AB+HIV1 P24 AG SERPL QL IA: NONREACTIVE

## 2019-08-05 ENCOUNTER — TELEPHONE (OUTPATIENT)
Dept: INTERNAL MEDICINE | Facility: OTHER | Age: 74
End: 2019-08-05

## 2019-08-05 DIAGNOSIS — I25.10 CORONARY ARTERY CALCIFICATION SEEN ON CAT SCAN: ICD-10-CM

## 2019-08-05 RX ORDER — METOPROLOL SUCCINATE 50 MG/1
TABLET, EXTENDED RELEASE ORAL
COMMUNITY
Start: 2019-06-07 | End: 2020-07-24

## 2019-08-05 RX ORDER — AMLODIPINE BESYLATE 5 MG/1
TABLET ORAL
COMMUNITY
Start: 2019-06-07 | End: 2020-07-24

## 2019-08-05 NOTE — TELEPHONE ENCOUNTER
"Called patient to clarify which medications she is out of. Says she was supposed to be taking metoprolol and amlodipine, and hasn't for a month due to \"all the trouble getting them.\" No record on med list. Can't remember the name of ordering doctor, but said she thinks [facility] in end of May.    Talked to pharmacist at [facility] mail order who states that patient did not request refill of rosuvastatin, agrees to process refills on file.   In addition [facility] never filled Rx for metoprolol or amlodipine, recommended [facility] locally.  Called pharmacistNaga at [facility] who said patient has enough Metoprolol for 45 days yet. However, was only given 45 days of amlodipine on 6-7-19.  Ordering provider was Dr. Cornell at [facility] per [facility]. Called [facility] back and per Joanne, they will fax Dr. Cornell to request urgent refill of amlodipine and metoprolol as we have no record of dose, etc. Patient preferred medication be filled at [facility]. Told patient to call us if she has any symptoms in meantime, none currently.  Jackie Prater, RN on 8/5/2019 at 2:53 PM    "

## 2019-08-19 ENCOUNTER — TELEPHONE (OUTPATIENT)
Dept: CARDIOLOGY | Facility: OTHER | Age: 74
End: 2019-08-19

## 2019-08-19 DIAGNOSIS — M05.741 RHEUMATOID ARTHRITIS INVOLVING BOTH HANDS WITH POSITIVE RHEUMATOID FACTOR (H): Primary | ICD-10-CM

## 2019-08-19 DIAGNOSIS — M05.742 RHEUMATOID ARTHRITIS INVOLVING BOTH HANDS WITH POSITIVE RHEUMATOID FACTOR (H): Primary | ICD-10-CM

## 2019-09-23 ENCOUNTER — OFFICE VISIT (OUTPATIENT)
Dept: PULMONOLOGY | Facility: OTHER | Age: 74
End: 2019-09-23
Attending: INTERNAL MEDICINE
Payer: COMMERCIAL

## 2019-09-23 VITALS
HEIGHT: 59 IN | DIASTOLIC BLOOD PRESSURE: 60 MMHG | HEART RATE: 75 BPM | OXYGEN SATURATION: 99 % | WEIGHT: 159 LBS | BODY MASS INDEX: 32.05 KG/M2 | SYSTOLIC BLOOD PRESSURE: 108 MMHG

## 2019-09-23 DIAGNOSIS — R06.09 DYSPNEA ON EXERTION: ICD-10-CM

## 2019-09-23 DIAGNOSIS — R53.83 OTHER FATIGUE: ICD-10-CM

## 2019-09-23 DIAGNOSIS — R06.83 SNORING: Primary | ICD-10-CM

## 2019-09-23 PROCEDURE — G0463 HOSPITAL OUTPT CLINIC VISIT: HCPCS

## 2019-09-23 ASSESSMENT — MIFFLIN-ST. JEOR: SCORE: 1136.85

## 2019-09-23 NOTE — PROGRESS NOTES
Sleep Medicine Note  Rebekah Shirley  2019  [id number]    Chief Complaint: snoring with fatigue obesity and concern for sleep apnea    History of Present Illness: Rebekah Shirley is a 74 year old female presenting for above complaint.  She has been seeing cardiology for chest pain.  She has complaint of fatigue tiredness and concern for sleep apnea given her body habitus.  She feels she does not have a sleep problem.  She scores herself 3 on the [place] score.  She rarely takes naps.  She feels that her sleep is poor because of her rheumatoid arthritis and chronic pain.  She sleeps alone but her grandchildren have told her that she occasionally snores.  On rare occasions she has awoken herself with a snore or snort.  She awakens feeling tired some of the time.  She awakens with headaches about half of the time.  She does have a dry mouth.  She wakens 2-3 times most nights but does not have nocturia.  She goes to bed between 10 PM and 11 PM and gets up about 5 AM.  She says she feels stressed because of her job.  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  She drinks about a half pot of coffee per day.  She drinks one soda pop a day many times at school Coke or Mountain Dew with caffeine but sometimes Orange.  Family history is negative for sleep apnea but positive for some members with snoring.  She does awaken with some joint stiffness.  She is a past smoker.  PFTs showed normal spirometry and diffusion capacity.        Past Medical History:  Past Medical History:   Diagnosis Date     Hyperlipidemia     No Comments Provided     Nicotine dependence, uncomplicated     No Comments Provided     Personal history of other medical treatment (CODE)      4, para 3.     Rheumatoid arthritis (H)        Medications:  Current Outpatient Medications   Medication     amLODIPine (NORVASC) 5 MG tablet     aspirin (ASA) 81 MG chewable tablet     folic acid (FOLVITE) 1 MG tablet      "inFLIXimab     methotrexate 2.5 MG tablet CHEMO     metoprolol succinate ER (TOPROL-XL) 50 MG 24 hr tablet     nitroGLYcerin (NITROSTAT) 0.4 MG sublingual tablet     predniSONE (DELTASONE) 5 MG tablet     rosuvastatin (CRESTOR) 20 MG tablet     sulfaSALAzine (AZULFIDINE) 500 MG tablet     No current facility-administered medications for this visit.        Physical Exam:  /60 (BP Location: Right arm, Patient Position: Sitting, Cuff Size: Adult Regular)   Pulse 75   Ht 4' 11\" (1.499 m)   Wt 159 lb (72.1 kg)   SpO2 99%   BMI 32.11 kg/m    Neck circumference 14-1/2 inches, pharynx is without erythema, dentures are in place, posterior crowding is noted, malampatti class IV.  No significant tonsillar tissue.  Lungs are clear no wheeze, rhonchi, crackles, or focal dullness.  Cardiovascular exam S1-S2 regular rhythm rate, lower extremedies are without edema    Assessment and Plan:  74 year old female presenting for fatigue and concern for sleep apnea.  She does not feel she has a sleep problem.  She does have a body habitus that would suggest sleep apnea.  The diagnosis, pathophysiology, and treatment of sleep apnea were discussed in detail.  We discussed initially starting with screening oximetry.  If oximetry shows significant rhythmic desaturations then we will go ahead and proceed with a sleep study..      "

## 2019-10-10 ENCOUNTER — HOSPITAL ENCOUNTER (OUTPATIENT)
Dept: GENERAL RADIOLOGY | Facility: OTHER | Age: 74
Discharge: HOME | End: 2019-10-10
Attending: NURSE PRACTITIONER | Admitting: NURSE PRACTITIONER
Payer: COMMERCIAL

## 2019-10-10 ENCOUNTER — OFFICE VISIT (OUTPATIENT)
Dept: FAMILY MEDICINE | Facility: OTHER | Age: 74
End: 2019-10-10
Attending: NURSE PRACTITIONER
Payer: COMMERCIAL

## 2019-10-10 VITALS
TEMPERATURE: 97.9 F | HEIGHT: 59 IN | HEART RATE: 97 BPM | DIASTOLIC BLOOD PRESSURE: 64 MMHG | WEIGHT: 158 LBS | RESPIRATION RATE: 20 BRPM | BODY MASS INDEX: 31.85 KG/M2 | SYSTOLIC BLOOD PRESSURE: 102 MMHG | OXYGEN SATURATION: 97 %

## 2019-10-10 DIAGNOSIS — M25.572 PAIN IN JOINT INVOLVING ANKLE AND FOOT, LEFT: ICD-10-CM

## 2019-10-10 DIAGNOSIS — S96.912A STRAIN OF LEFT ANKLE, INITIAL ENCOUNTER: Primary | ICD-10-CM

## 2019-10-10 PROCEDURE — 99214 OFFICE O/P EST MOD 30 MIN: CPT | Performed by: NURSE PRACTITIONER

## 2019-10-10 PROCEDURE — 73630 X-RAY EXAM OF FOOT: CPT | Mod: LT

## 2019-10-10 PROCEDURE — G0463 HOSPITAL OUTPT CLINIC VISIT: HCPCS | Mod: 25

## 2019-10-10 PROCEDURE — G0463 HOSPITAL OUTPT CLINIC VISIT: HCPCS

## 2019-10-10 SDOH — HEALTH STABILITY: MENTAL HEALTH: HOW OFTEN DO YOU HAVE A DRINK CONTAINING ALCOHOL?: NEVER

## 2019-10-10 ASSESSMENT — ANXIETY QUESTIONNAIRES
GAD7 TOTAL SCORE: 0
2. NOT BEING ABLE TO STOP OR CONTROL WORRYING: NOT AT ALL
IF YOU CHECKED OFF ANY PROBLEMS ON THIS QUESTIONNAIRE, HOW DIFFICULT HAVE THESE PROBLEMS MADE IT FOR YOU TO DO YOUR WORK, TAKE CARE OF THINGS AT HOME, OR GET ALONG WITH OTHER PEOPLE: NOT DIFFICULT AT ALL
1. FEELING NERVOUS, ANXIOUS, OR ON EDGE: NOT AT ALL
5. BEING SO RESTLESS THAT IT IS HARD TO SIT STILL: NOT AT ALL
3. WORRYING TOO MUCH ABOUT DIFFERENT THINGS: NOT AT ALL
6. BECOMING EASILY ANNOYED OR IRRITABLE: NOT AT ALL
7. FEELING AFRAID AS IF SOMETHING AWFUL MIGHT HAPPEN: NOT AT ALL

## 2019-10-10 ASSESSMENT — PATIENT HEALTH QUESTIONNAIRE - PHQ9
5. POOR APPETITE OR OVEREATING: NOT AT ALL
SUM OF ALL RESPONSES TO PHQ QUESTIONS 1-9: 0

## 2019-10-10 ASSESSMENT — MIFFLIN-ST. JEOR: SCORE: 1133.26

## 2019-10-10 ASSESSMENT — PAIN SCALES - GENERAL: PAINLEVEL: WORST PAIN (10)

## 2019-10-10 NOTE — PROGRESS NOTES
HPI:    Rebekah Shirley is a 74 year old female who presents to clinic today for left foot pain.  She reports she has had a long history of pain in her left foot that is intermittent.  On Friday she turned wrong twice and had shooting pains on the inner aspect of her foot.  She had trouble walking right away.  This did get better over the weekend and then Tuesday and Wednesday of this week her symptoms worsened again.  She has noted some swelling.  Feels a numbness in her foot at times.  She is not taking any over-the-counter medications.  No history of fractures that she is aware of.  She is asking for an x-ray today.    Past Medical History:   Diagnosis Date     Hyperlipidemia     No Comments Provided     Nicotine dependence, uncomplicated     No Comments Provided     Personal history of other medical treatment (CODE)      4, para 3.     Rheumatoid arthritis (H)        Past Surgical History:   Procedure Laterality Date     APPENDECTOMY OPEN      Incidental appendectomy at time of adhesion lysis.     COLONOSCOPY      06,Colonoscopy (screening), normal, Dr. Orellana     LAPAROSCOPIC TUBAL LIGATION           LAPAROTOMY EXPLORATORY      History of partial small bowel obstruction necessitating exploratory laparotomy.  Small area of adhesions with secondary lysis felt responsible for the partial obstruction.       Family History   Problem Relation Age of Onset     Heart Disease Father 58        MI     Arthritis Mother      Hyperlipidemia Mother      Other - See Comments Sister         Occupational lung disease     Other - See Comments Brother         Laryngeal cancer     Heart Disease Sister         Surgery for heart       Social History     Socioeconomic History     Marital status: Single     Spouse name:      Number of children: Not on file     Years of education: Not on file     Highest education level: Not on file   Occupational History     Not on file   Social Needs     Financial resource  strain: Not on file     Food insecurity:     Worry: Not on file     Inability: Not on file     Transportation needs:     Medical: Not on file     Non-medical: Not on file   Tobacco Use     Smoking status: Former Smoker     Packs/day: 0.10     Last attempt to quit: 11/5/2008     Years since quitting: 10.9     Smokeless tobacco: Never Used   Substance and Sexual Activity     Alcohol use: No     Frequency: Never     Drug use: No     Sexual activity: Not Currently   Lifestyle     Physical activity:     Days per week: Not on file     Minutes per session: Not on file     Stress: Not on file   Relationships     Social connections:     Talks on phone: Not on file     Gets together: Not on file     Attends Sabianism service: Not on file     Active member of club or organization: Not on file     Attends meetings of clubs or organizations: Not on file     Relationship status: Not on file     Intimate partner violence:     Fear of current or ex partner: Not on file     Emotionally abused: Not on file     Physically abused: Not on file     Forced sexual activity: Not on file   Other Topics Concern     Parent/sibling w/ CABG, MI or angioplasty before 65F 55M? Not Asked   Social History Narrative    . Three sons. 4 grandchildren. Works for [employer] in the kitchen.       Current Outpatient Medications   Medication Sig Dispense Refill     amLODIPine (NORVASC) 5 MG tablet        aspirin (ASA) 81 MG chewable tablet Take 1 tablet (81 mg) by mouth daily 90 tablet 3     folic acid (FOLVITE) 1 MG tablet Take 1 mg by mouth daily       inFLIXimab Inject into the vein once for 1 dose       methotrexate 2.5 MG tablet CHEMO Take 25 mg by mouth every 7 days       metoprolol succinate ER (TOPROL-XL) 50 MG 24 hr tablet        nitroGLYcerin (NITROSTAT) 0.4 MG sublingual tablet Place 1 tablet (0.4 mg) under the tongue every 5 minutes as needed for chest pain 20 tablet 0     order for DME Equipment being ordered: ankle brace 1 each 0  "    predniSONE (DELTASONE) 5 MG tablet TAKE 1 TABLET EVERY DAY       rosuvastatin (CRESTOR) 20 MG tablet Take 1 tablet (20 mg) by mouth daily 90 tablet 3     sulfaSALAzine (AZULFIDINE) 500 MG tablet TAKE 2 TABLETS BY MOUTH EVERY MORNING AND 3 TABLETS EVERY EVENING         No Known Allergies    ROS:  Pertinent positives and negatives are noted in HPI.    EXAM:  /64 (BP Location: Right arm, Patient Position: Sitting, Cuff Size: Adult Regular)   Pulse 97   Temp 97.9  F (36.6  C) (Tympanic)   Resp 20   Ht 1.5 m (4' 11.06\")   Wt 71.7 kg (158 lb)   SpO2 97%   Breastfeeding? No   BMI 31.85 kg/m    General appearance: well appearing female, in no acute distress  Musculoskeletal: Favors left foot with walking.  She is tender over medial aspect of left foot just distal to malleolus.  No bruising.  Minimal swelling.  Dermatological: no rashes or lesions  Psychological: normal affect, alert and pleasant  Xray: xray independently reviewed and no acute fractures appreciated; pending radiology over-read    ASSESSMENT AND PLAN:    1. Strain of left ankle, initial encounter    2. Pain in joint involving ankle and foot, left      X-ray does not show any signs of fractures.  Overall her exam is fairly benign other than some mild tenderness and change in ambulation.  I suspect she strained her left foot and ankle.  Ankle brace was provided at this time.  Discussed symptomatic management including ice, elevation and Tylenol as needed.  If her symptoms persist more than a couple weeks I would recommend that she follow-up with orthopedics for further evaluation.      JULIET Staples CNP..................10/10/2019 1:33 PM      This document was prepared using voice generated software.  While every attempt was made for accuracy, grammatical errors may exist.  "

## 2019-10-10 NOTE — NURSING NOTE
Patient presents to clinic today for left foot pain after stepping wrong. She states she injured the same foot 4-5 years ago.     No LMP recorded. Patient is postmenopausal.  Medication Reconciliation: complete    Maisha Sandoval LPN  10/10/2019 1:34 PM

## 2019-10-11 ASSESSMENT — ANXIETY QUESTIONNAIRES: GAD7 TOTAL SCORE: 0

## 2020-03-26 DIAGNOSIS — I25.10 CORONARY ARTERY CALCIFICATION SEEN ON CAT SCAN: ICD-10-CM

## 2020-03-26 RX ORDER — ROSUVASTATIN CALCIUM 20 MG/1
TABLET, COATED ORAL
Qty: 90 TABLET | Refills: 0 | Status: SHIPPED | OUTPATIENT
Start: 2020-03-26 | End: 2020-07-24

## 2020-03-26 NOTE — LETTER
March 26, 2020      Rebekah Shirley  [address]  [place] [address]        Dear Rebekah,     A refill of Rosuvastatin has been requested by your pharmacy and it appears you do not have a primary care provider.  We ask that you please contact [facility] for assistance in scheduling a medication management appointment with one of our providers.    The refill request for this medication can then be discussed and addressed accordingly with your new primary care physician.  Your health is very important to us.  Please call the clinic at [phone] to schedule your appointment.    A limited shahram refill has been sent to your pharmacy to allow time to schedule your appointment.    Thank you for choosing [facility] and [facility] for your health care needs.    Sincerely,    Refill RN  [facility]

## 2020-03-26 NOTE — TELEPHONE ENCOUNTER
Dereck sent Rx request for the following:      ROSUVASTATIN CALCIUM 20 MG Tablet   Sig: TAKE 1 TABLET EVERY DAY    Last Prescription Date:   4/29/2019  Last Fill Qty/Refills:         90, R-3    Last Office Visit:              4/29/2019   Future Office visit:           none    Prescription refilled per RN Medication Refill Policy.................... Yung Long RN ....................  3/26/2020   3:25 PM

## 2020-07-09 DIAGNOSIS — I25.10 CORONARY ARTERY CALCIFICATION SEEN ON CAT SCAN: ICD-10-CM

## 2020-07-14 RX ORDER — ROSUVASTATIN CALCIUM 20 MG/1
TABLET, COATED ORAL
Qty: 90 TABLET | Refills: 0 | OUTPATIENT
Start: 2020-07-14

## 2020-07-14 NOTE — TELEPHONE ENCOUNTER
"Requested Prescriptions   Pending Prescriptions Disp Refills     rosuvastatin (CRESTOR) 20 MG tablet [Pharmacy Med Name: ROSUVASTATIN CALCIUM 20 MG Tablet] 90 tablet 0     Sig: TAKE 1 TABLET EVERY DAY (NEED TO ESTABLISH CARE WITH NEW PCP FOR FUTURE REFILLS)       Statins Protocol Failed - 7/9/2020  7:10 PM        Failed - LDL on file in past 12 months     Recent Labs   Lab Test 06/25/19  1055   LDL 68             Failed - Recent (12 mo) or future (30 days) visit within the authorizing provider's specialty     Patient has had an office visit with the authorizing provider or a provider within the authorizing providers department within the previous 12 mos or has a future within next 30 days. See \"Patient Info\" tab in inbasket, or \"Choose Columns\" in Meds & Orders section of the refill encounter.              Passed - No abnormal creatine kinase in past 12 months     No lab results found.             Passed - Medication is active on med list        Passed - Patient is age 18 or older        Passed - No active pregnancy on record        Passed - No positive pregnancy test in past 12 months               Last Written Prescription Date:  03/26/2020  Last Fill Quantity: 90,   # refills: 0  Last Office Visit: 10/10/2019 with Shayna ZHOU  Future Office visit:   None noted.  Patient has no PCP.  Unable to complete prescription refill per RN medication refill policy. Will route to provider for review and consideration.  Lor Ledbetter RN on 7/14/2020 at 2:35 PM            "

## 2020-07-21 ENCOUNTER — OFFICE VISIT (OUTPATIENT)
Dept: FAMILY MEDICINE | Facility: OTHER | Age: 75
End: 2020-07-21
Attending: PHYSICIAN ASSISTANT
Payer: COMMERCIAL

## 2020-07-21 ENCOUNTER — HOSPITAL ENCOUNTER (OUTPATIENT)
Dept: GENERAL RADIOLOGY | Facility: OTHER | Age: 75
End: 2020-07-21
Attending: PHYSICIAN ASSISTANT
Payer: COMMERCIAL

## 2020-07-21 ENCOUNTER — HOSPITAL ENCOUNTER (OUTPATIENT)
Dept: ULTRASOUND IMAGING | Facility: OTHER | Age: 75
End: 2020-07-21
Attending: PHYSICIAN ASSISTANT
Payer: COMMERCIAL

## 2020-07-21 VITALS
DIASTOLIC BLOOD PRESSURE: 70 MMHG | SYSTOLIC BLOOD PRESSURE: 132 MMHG | RESPIRATION RATE: 20 BRPM | BODY MASS INDEX: 33.38 KG/M2 | TEMPERATURE: 98.2 F | WEIGHT: 165.6 LBS | HEART RATE: 84 BPM

## 2020-07-21 DIAGNOSIS — M25.572 PAIN IN JOINT INVOLVING ANKLE AND FOOT, LEFT: ICD-10-CM

## 2020-07-21 DIAGNOSIS — M79.605 LEFT LEG PAIN: Primary | ICD-10-CM

## 2020-07-21 DIAGNOSIS — M79.605 LEFT LEG PAIN: ICD-10-CM

## 2020-07-21 LAB
ANION GAP SERPL CALCULATED.3IONS-SCNC: 9 MMOL/L (ref 3–14)
BASOPHILS # BLD AUTO: 0 10E9/L (ref 0–0.2)
BASOPHILS NFR BLD AUTO: 0.4 %
BUN SERPL-MCNC: 16 MG/DL (ref 7–25)
CALCIUM SERPL-MCNC: 8.9 MG/DL (ref 8.6–10.3)
CHLORIDE SERPL-SCNC: 105 MMOL/L (ref 98–107)
CO2 SERPL-SCNC: 24 MMOL/L (ref 21–31)
CREAT SERPL-MCNC: 0.74 MG/DL (ref 0.6–1.2)
DIFFERENTIAL METHOD BLD: ABNORMAL
EOSINOPHIL # BLD AUTO: 0.1 10E9/L (ref 0–0.7)
EOSINOPHIL NFR BLD AUTO: 0.8 %
ERYTHROCYTE [DISTWIDTH] IN BLOOD BY AUTOMATED COUNT: 12.8 % (ref 10–15)
GFR SERPL CREATININE-BSD FRML MDRD: 77 ML/MIN/{1.73_M2}
GLUCOSE SERPL-MCNC: 134 MG/DL (ref 70–105)
HCT VFR BLD AUTO: 34.7 % (ref 35–47)
HGB BLD-MCNC: 11.3 G/DL (ref 11.7–15.7)
IMM GRANULOCYTES # BLD: 0 10E9/L (ref 0–0.4)
IMM GRANULOCYTES NFR BLD: 0.3 %
LYMPHOCYTES # BLD AUTO: 2 10E9/L (ref 0.8–5.3)
LYMPHOCYTES NFR BLD AUTO: 26.7 %
MAGNESIUM SERPL-MCNC: 2.2 MG/DL (ref 1.9–2.7)
MCH RBC QN AUTO: 32.1 PG (ref 26.5–33)
MCHC RBC AUTO-ENTMCNC: 32.6 G/DL (ref 31.5–36.5)
MCV RBC AUTO: 99 FL (ref 78–100)
MONOCYTES # BLD AUTO: 0.7 10E9/L (ref 0–1.3)
MONOCYTES NFR BLD AUTO: 9.4 %
NEUTROPHILS # BLD AUTO: 4.7 10E9/L (ref 1.6–8.3)
NEUTROPHILS NFR BLD AUTO: 62.4 %
PLATELET # BLD AUTO: 263 10E9/L (ref 150–450)
POTASSIUM SERPL-SCNC: 3.7 MMOL/L (ref 3.5–5.1)
RBC # BLD AUTO: 3.52 10E12/L (ref 3.8–5.2)
SODIUM SERPL-SCNC: 138 MMOL/L (ref 134–144)
WBC # BLD AUTO: 7.5 10E9/L (ref 4–11)

## 2020-07-21 PROCEDURE — 83735 ASSAY OF MAGNESIUM: CPT | Mod: ZL | Performed by: PHYSICIAN ASSISTANT

## 2020-07-21 PROCEDURE — 85025 COMPLETE CBC W/AUTO DIFF WBC: CPT | Mod: ZL | Performed by: PHYSICIAN ASSISTANT

## 2020-07-21 PROCEDURE — 36415 COLL VENOUS BLD VENIPUNCTURE: CPT | Mod: ZL | Performed by: PHYSICIAN ASSISTANT

## 2020-07-21 PROCEDURE — G0463 HOSPITAL OUTPT CLINIC VISIT: HCPCS | Mod: 25

## 2020-07-21 PROCEDURE — 93971 EXTREMITY STUDY: CPT | Mod: LT

## 2020-07-21 PROCEDURE — 80048 BASIC METABOLIC PNL TOTAL CA: CPT | Mod: ZL | Performed by: PHYSICIAN ASSISTANT

## 2020-07-21 PROCEDURE — G0463 HOSPITAL OUTPT CLINIC VISIT: HCPCS

## 2020-07-21 PROCEDURE — 73562 X-RAY EXAM OF KNEE 3: CPT | Mod: LT

## 2020-07-21 PROCEDURE — 73610 X-RAY EXAM OF ANKLE: CPT | Mod: LT

## 2020-07-21 PROCEDURE — 99214 OFFICE O/P EST MOD 30 MIN: CPT | Performed by: PHYSICIAN ASSISTANT

## 2020-07-21 ASSESSMENT — PAIN SCALES - GENERAL: PAINLEVEL: MODERATE PAIN (4)

## 2020-07-21 NOTE — NURSING NOTE
Chief Complaint   Patient presents with     Musculoskeletal Problem     Left Leg pain         Medication Reconciliation: complete    Kayley Crespo, LPN

## 2020-07-21 NOTE — PATIENT INSTRUCTIONS
Encouraged to take ibuprofen (400-600mg) for relief up to 4 times per day.  Encouraged rest, stretching and elevation.  Encouraged to use ice or heat 15 minutes at a time several times per day to decrease pain. Return to clinic in 1-2 weeks as necessary for persistent pain. Return to clinic with any change or worsening of symptoms.       Patient Education     Leg and Knee Exercises: Heel Raise    This exercise is designed to strengthen your knee and calf. Before beginning, read through all the instructions. While exercising, breathe normally and use smooth movements. If you feel any pain, stop the exercise. If pain persists, inform your healthcare provider.    Don t lock your knees.    Don t arch your back.  Date Last Reviewed: 11/1/2017 2000-2019 [employer]. [address] [place] [address]. All rights reserved. This information is not intended as a substitute for professional medical care. Always follow your healthcare professional's instructions.           Patient Education     Back Exercises: Leg Pull    To start, lie on your back with your knees bent and feet flat on the floor. Don t press your neck or lower back to the floor. Breathe deeply. You should feel comfortable and relaxed in this position.    Pull one knee to your chest.    Hold for 30 to 60 seconds. Return to starting position.    Repeat 2 times.    Switch legs.    For a double leg pull, pull both legs to your chest at the same time. Repeat 2 times.  For your safety, check with your healthcare provider before starting an exercise program.  Date Last Reviewed: 3/1/2018    2746-5591 [employer]. [address] [place] [id number]. All rights reserved. This information is not intended as a substitute for professional medical care. Always follow your healthcare professional's instructions.           Patient Education     Foot and Ankle Exercises: Single-Leg Heel Raise    This exercise is designed to stretch  and strengthen your feet and ankles. Before beginning the exercise, read through all the instructions. While exercising, breathe normally and don t bounce. If you feel any pain, stop the exercise. If pain persists, inform your healthcare provider. Here are the steps to the single-leg heel raise:    Stand, using a sturdy counter for balance only. Lift 1 foot and stand with your weight on the other foot.    Rise up on your toes, then lower back onto your heel.    Repeat 10 times. Do 3 sets a day.  Date Last Reviewed: 10/1/2017    7588-2989 [employer]. [address] [place] [address]. All rights reserved. This information is not intended as a substitute for professional medical care. Always follow your healthcare professional's instructions.           Patient Education     Hamstring Curls    The following exercise helps build strong, balanced leg muscles. Make sure to adjust exercise machines as instructed by your physical therapist. He or she will tell you how many times to do the exercise.  Note: To prevent injury, always warm up and stretch before your strengthening exercises. Stop any exercise that causes pain. Discuss it with your physical therapist or healthcare provider.  Here are the steps to hamstring curls:    Lying on your stomach, pull one leg up as far as you comfortably can.    Let your leg uncurl slowly and steadily.    Take care not to arch your back.  Date Last Reviewed: 11/1/2017 2000-2019 [employer]. [address] [place] [address]. All rights reserved. This information is not intended as a substitute for professional medical care. Always follow your healthcare professional's instructions.

## 2020-07-21 NOTE — PROGRESS NOTES
.  Nursing Notes:   Kayley Crespo LPN  2020  2:24 PM  Signed  Chief Complaint   Patient presents with     Musculoskeletal Problem     Left Leg pain         Medication Reconciliation: complete    Kayley Crespo LPN      HPI:    Rebekah Shirley is a 75 year old female who presents for left leg pain.  Over the last week patient has pain on her left posterior thigh that radiates down to the left posterior calf.  Some swelling and minimal amount of pain appreciated on the left medial ankle.  No recent fall or injury.  No known trauma.  No other bruising or swelling appreciated.  No lower extremity edema.  No chest pain, palpitations, problems breathing, fevers, chills.  Pain goes up and down.  Pain is worse with standing, walking and bending.  Pain is worst when she wakes up in the morning and tries to get out of bed.    Past Medical History:   Diagnosis Date     Hyperlipidemia     No Comments Provided     Nicotine dependence, uncomplicated     No Comments Provided     Personal history of other medical treatment (CODE)      4, para 3.     Rheumatoid arthritis (H)        Past Surgical History:   Procedure Laterality Date     APPENDECTOMY OPEN      Incidental appendectomy at time of adhesion lysis.     COLONOSCOPY      06,Colonoscopy (screening), normal, Dr. Orellana     LAPAROSCOPIC TUBAL LIGATION           LAPAROTOMY EXPLORATORY      History of partial small bowel obstruction necessitating exploratory laparotomy.  Small area of adhesions with secondary lysis felt responsible for the partial obstruction.       Family History   Problem Relation Age of Onset     Heart Disease Father 58        MI     Arthritis Mother      Hyperlipidemia Mother      Other - See Comments Sister         Occupational lung disease     Other - See Comments Brother         Laryngeal cancer     Heart Disease Sister         Surgery for heart       Social History     Tobacco Use     Smoking status: Former  Smoker     Packs/day: 0.10     Last attempt to quit: 2008     Years since quittin.7     Smokeless tobacco: Never Used   Substance Use Topics     Alcohol use: No     Frequency: Never       Current Outpatient Medications   Medication Sig Dispense Refill     amLODIPine (NORVASC) 5 MG tablet        aspirin (ASA) 81 MG chewable tablet Take 1 tablet (81 mg) by mouth daily 90 tablet 3     folic acid (FOLVITE) 1 MG tablet Take 1 mg by mouth daily       inFLIXimab Inject into the vein once for 1 dose       methotrexate 2.5 MG tablet CHEMO Take 25 mg by mouth every 7 days       metoprolol succinate ER (TOPROL-XL) 50 MG 24 hr tablet        nitroGLYcerin (NITROSTAT) 0.4 MG sublingual tablet Place 1 tablet (0.4 mg) under the tongue every 5 minutes as needed for chest pain 20 tablet 0     order for DME Equipment being ordered: ankle brace 1 each 0     predniSONE (DELTASONE) 5 MG tablet TAKE 1 TABLET EVERY DAY       rosuvastatin (CRESTOR) 20 MG tablet TAKE 1 TABLET EVERY DAY 90 tablet 0     sulfaSALAzine (AZULFIDINE) 500 MG tablet TAKE 2 TABLETS BY MOUTH EVERY MORNING AND 3 TABLETS EVERY EVENING         No Known Allergies    REVIEW OF SYSTEMS:  Refer to HPI.    EXAM:   Vitals:    /70 (BP Location: Right arm, Patient Position: Sitting, Cuff Size: Adult Regular)   Pulse 84   Temp 98.2  F (36.8  C)   Resp 20   Wt 75.1 kg (165 lb 9.6 oz)   BMI 33.38 kg/m      General Appearance: Pleasant, alert, appropriate appearance for age. No acute distress  Musculoskeletal Exam: Back is straight and non-tender, full ROM of upper and lower extremities.  No spinal or paraspinous muscle pain to palpation.  Minimal tenderness to palpation of the left posterior knee and left medial distal ankle.  Mild swelling appreciated on left medial distal ankle.  No bruising appreciated.  No calf pain with palpation.  No lower extremity edema appreciated.  Minimal pain of left posterior thigh and calf with flexion of the leg.  Otherwise  unremarkable lower extremity range of motion.  Negative Homans sign.  Foot Exam: Left and right foot: good pedal pulses, normal capillary refill, no lesions, nail hygiene good.  Skin: no rash or abnormalities  Neurologic Exam: Nonfocal, normal gross motor, tone coordination and no tremor.  Psychiatric Exam: Alert and oriented -appropriate affect.    PHQ Depression Screen  PHQ-9 SCORE 3/21/2019 4/29/2019 10/10/2019   PHQ-9 Total Score 0 0 0     Labs:   Results for orders placed or performed during the hospital encounter of 07/21/20   XR Knee Left 3 Views     Status: None    Narrative    Exam: XR KNEE LT 3 VW, XR ANKLE LT G/E 3 VW     History:Female, age 75 years, Left leg pain    Comparison:  None    Technique: Three views of the left knee and left ankle are submitted.    Findings: Bones are mildly osteopenic. No evidence of acute or  subacute fracture.  No evidence of dislocation.  Left knee is normal.    Left ankle demonstrates lateral soft tissue swelling with suggestion  of old healed lateral malleolus fracture. Prominent spur is seen along  the plantar aspect of the calcaneus.           Impression    Impression:  No evidence of acute or subacute bony abnormality.     Lateral soft tissue swelling of the ankle suggestion of old healed  fracture involving the lateral malleolus.    Generalized osteopenia.    MILADYS HOLLINGSWORTH MD   Results for orders placed or performed during the hospital encounter of 07/21/20   XR Ankle Left G/E 3 Views     Status: None    Narrative    Exam: XR KNEE LT 3 VW, XR ANKLE LT G/E 3 VW     History:Female, age 75 years, Left leg pain    Comparison:  None    Technique: Three views of the left knee and left ankle are submitted.    Findings: Bones are mildly osteopenic. No evidence of acute or  subacute fracture.  No evidence of dislocation.  Left knee is normal.    Left ankle demonstrates lateral soft tissue swelling with suggestion  of old healed lateral malleolus fracture. Prominent spur is  seen along  the plantar aspect of the calcaneus.           Impression    Impression:  No evidence of acute or subacute bony abnormality.     Lateral soft tissue swelling of the ankle suggestion of old healed  fracture involving the lateral malleolus.    Generalized osteopenia.    MILADYS HOLLINGSWORTH MD   Results for orders placed or performed during the hospital encounter of 07/21/20   US Lower Extremity Venous Duplex Left     Status: None    Narrative    Exam:US LOWER EXTREMITY VENOUS DUPLEX LEFT    History: 75 years Female with left leg pain    Comparisons:    Technique: Venous duplex ultrasonography of the left lower extremity  was performed.     Findings: The common femoral vein, superficial femoral vein and  popliteal vein are fully compressible with spontaneous and augmentable  venous flow.           Impression    Impression: No evidence of deep venous thrombosis within the left  lower extremity.    HAIM DARBY MD   Results for orders placed or performed in visit on 07/21/20   Magnesium     Status: None   Result Value Ref Range    Magnesium 2.2 1.9 - 2.7 mg/dL   Basic Metabolic Panel     Status: Abnormal   Result Value Ref Range    Sodium 138 134 - 144 mmol/L    Potassium 3.7 3.5 - 5.1 mmol/L    Chloride 105 98 - 107 mmol/L    Carbon Dioxide 24 21 - 31 mmol/L    Anion Gap 9 3 - 14 mmol/L    Glucose 134 (H) 70 - 105 mg/dL    Urea Nitrogen 16 7 - 25 mg/dL    Creatinine 0.74 0.60 - 1.20 mg/dL    GFR Estimate 77 >60 mL/min/[1.73_m2]    GFR Estimate If Black >90 >60 mL/min/[1.73_m2]    Calcium 8.9 8.6 - 10.3 mg/dL   CBC and Differential     Status: Abnormal   Result Value Ref Range    WBC 7.5 4.0 - 11.0 10e9/L    RBC Count 3.52 (L) 3.8 - 5.2 10e12/L    Hemoglobin 11.3 (L) 11.7 - 15.7 g/dL    Hematocrit 34.7 (L) 35.0 - 47.0 %    MCV 99 78 - 100 fl    MCH 32.1 26.5 - 33.0 pg    MCHC 32.6 31.5 - 36.5 g/dL    RDW 12.8 10.0 - 15.0 %    Platelet Count 263 150 - 450 10e9/L    Diff Method Automated Method     %  Neutrophils 62.4 %    % Lymphocytes 26.7 %    % Monocytes 9.4 %    % Eosinophils 0.8 %    % Basophils 0.4 %    % Immature Granulocytes 0.3 %    Absolute Neutrophil 4.7 1.6 - 8.3 10e9/L    Absolute Lymphocytes 2.0 0.8 - 5.3 10e9/L    Absolute Monocytes 0.7 0.0 - 1.3 10e9/L    Absolute Eosinophils 0.1 0.0 - 0.7 10e9/L    Absolute Basophils 0.0 0.0 - 0.2 10e9/L    Abs Immature Granulocytes 0.0 0 - 0.4 10e9/L      ASSESSMENT AND PLAN:      ICD-10-CM    1. Left leg pain  M79.605 CBC and Differential     Basic Metabolic Panel     Magnesium     US Lower Extremity Venous Duplex Left     Magnesium     Basic Metabolic Panel     CBC and Differential     XR Knee Left 3 Views     PHYSICAL THERAPY REFERRAL   2. Pain in joint involving ankle and foot, left  M25.572 XR Ankle Left G/E 3 Views     PHYSICAL THERAPY REFERRAL         Complete labs to rule out concerns.  Patient had an unremarkable CBC, BMP and magnesium.  Completed left lower extremity ultrasound to rule out DVT concerns.  Unremarkable ultrasound.    Completed left knee and ankle xray.  I personally reviewed the xray. I found no fracture appreciated upon initial read of xray.  Final read pending by radiology.    Left leg pain:  Likely due to muscle inflammation.  Also encouraged increased water intake.  Encouraged to take ibuprofen (400-600mg) for relief up to 4 times per day.  Encouraged rest, stretching and elevation.  Encouraged to use ice or heat 15 minutes at a time several times per day to decrease pain. Return to clinic in 1-2 weeks as necessary for persistent pain. Return to clinic with any change or worsening of symptoms.   Referred to physical therapy for consult.  Can also obtain a massage in order to calm down symptoms.    Patient Instructions   Encouraged to take ibuprofen (400-600mg) for relief up to 4 times per day.  Encouraged rest, stretching and elevation.  Encouraged to use ice or heat 15 minutes at a time several times per day to decrease pain.  Return to clinic in 1-2 weeks as necessary for persistent pain. Return to clinic with any change or worsening of symptoms.       Patient Education     Leg and Knee Exercises: Heel Raise    This exercise is designed to strengthen your knee and calf. Before beginning, read through all the instructions. While exercising, breathe normally and use smooth movements. If you feel any pain, stop the exercise. If pain persists, inform your healthcare provider.    Don t lock your knees.    Don t arch your back.  Date Last Reviewed: 11/1/2017 2000-2019 [employer]. [address] [place] [address]. All rights reserved. This information is not intended as a substitute for professional medical care. Always follow your healthcare professional's instructions.           Patient Education     Back Exercises: Leg Pull    To start, lie on your back with your knees bent and feet flat on the floor. Don t press your neck or lower back to the floor. Breathe deeply. You should feel comfortable and relaxed in this position.    Pull one knee to your chest.    Hold for 30 to 60 seconds. Return to starting position.    Repeat 2 times.    Switch legs.    For a double leg pull, pull both legs to your chest at the same time. Repeat 2 times.  For your safety, check with your healthcare provider before starting an exercise program.  Date Last Reviewed: 3/1/2018    7738-5944 [employer]. [address] [place] [address]. All rights reserved. This information is not intended as a substitute for professional medical care. Always follow your healthcare professional's instructions.           Patient Education     Foot and Ankle Exercises: Single-Leg Heel Raise    This exercise is designed to stretch and strengthen your feet and ankles. Before beginning the exercise, read through all the instructions. While exercising, breathe normally and don t bounce. If you feel any pain, stop the exercise. If pain  persists, inform your healthcare provider. Here are the steps to the single-leg heel raise:    Stand, using a sturdy counter for balance only. Lift 1 foot and stand with your weight on the other foot.    Rise up on your toes, then lower back onto your heel.    Repeat 10 times. Do 3 sets a day.  Date Last Reviewed: 10/1/2017    0282-0012 [employer]. [address] [place] [address]. All rights reserved. This information is not intended as a substitute for professional medical care. Always follow your healthcare professional's instructions.           Patient Education     Hamstring Curls    The following exercise helps build strong, balanced leg muscles. Make sure to adjust exercise machines as instructed by your physical therapist. He or she will tell you how many times to do the exercise.  Note: To prevent injury, always warm up and stretch before your strengthening exercises. Stop any exercise that causes pain. Discuss it with your physical therapist or healthcare provider.  Here are the steps to hamstring curls:    Lying on your stomach, pull one leg up as far as you comfortably can.    Let your leg uncurl slowly and steadily.    Take care not to arch your back.  Date Last Reviewed: 11/1/2017 2000-2019 [employer]. [address] [place] [address]. All rights reserved. This information is not intended as a substitute for professional medical care. Always follow your healthcare professional's instructions.                Cheri Urias PA-C PA-C..................7/21/2020 2:23 PM

## 2020-07-23 ENCOUNTER — TELEPHONE (OUTPATIENT)
Dept: FAMILY MEDICINE | Facility: OTHER | Age: 75
End: 2020-07-23

## 2020-07-23 ENCOUNTER — HOSPITAL ENCOUNTER (OUTPATIENT)
Dept: PHYSICAL THERAPY | Facility: OTHER | Age: 75
Setting detail: THERAPIES SERIES
End: 2020-07-23
Attending: PHYSICIAN ASSISTANT
Payer: COMMERCIAL

## 2020-07-23 DIAGNOSIS — M25.572 PAIN IN JOINT INVOLVING ANKLE AND FOOT, LEFT: ICD-10-CM

## 2020-07-23 DIAGNOSIS — I25.10 CORONARY ARTERY CALCIFICATION SEEN ON CAT SCAN: ICD-10-CM

## 2020-07-23 DIAGNOSIS — R93.1 ELEVATED CORONARY ARTERY CALCIUM SCORE: Primary | ICD-10-CM

## 2020-07-23 DIAGNOSIS — M79.605 LEFT LEG PAIN: ICD-10-CM

## 2020-07-23 PROCEDURE — 97110 THERAPEUTIC EXERCISES: CPT | Mod: GP | Performed by: PHYSICAL THERAPIST

## 2020-07-23 PROCEDURE — 97161 PT EVAL LOW COMPLEX 20 MIN: CPT | Mod: GP | Performed by: PHYSICAL THERAPIST

## 2020-07-23 NOTE — PROGRESS NOTES
07/23/20 1400   General Information   Type of Visit Initial OP Ortho PT Evaluation   Start of Care Date 07/23/20   Referring Physician DEMETRIA Urias   Patient/Family Goals Statement walk without pain   Orders Evaluate and Treat   Date of Order 07/21/20   Certification Required? Yes   Medical Diagnosis M79.605 (ICD-10-CM) - Left leg pain; M25.572 (ICD-10-CM) - Pain in joint involving ankle and foot, left    Surgical/Medical history reviewed Yes   Precautions/Limitations no known precautions/limitations   Weight-Bearing Status - LLE full weight-bearing   Weight-Bearing Status - RLE full weight-bearing   General Information Comments please refer to pt's medical record for any additional information   Body Part(s)   Body Part(s) Knee   Presentation and Etiology   Pertinent history of current problem (include personal factors and/or comorbidities that impact the POC) Her pain started about a week ago with no specific mechanism of injury. Pain goes from her calf to her butt. Worst is in the morning then it tapers off throughout the day. She has imaging done to r/o blood clots, see pts chart for details.    Impairments A. Pain   Functional Limitations perform activities of daily living;perform required work activities;perform desired leisure / sports activities   Symptom Location L LE, calf region up to her posterior thigh/buttock region   How/Where did it occur From insidious onset   Onset date of current episode/exacerbation 07/14/20   Chronicity New   Pain rating (0-10 point scale) Best (/10);Worst (/10)   Best (/10) 2   Worst (/10) 9   Pain quality A. Sharp;B. Dull;C. Aching;E. Shooting   Frequency of pain/symptoms A. Constant   Pain/symptoms are: Worse in the morning   Pain/symptoms exacerbated by B. Walking;G. Certain positions;L. Work tasks   Pain/symptoms eased by A. Sitting;C. Rest;E. Changing positions;F. Certain positions   Progression of symptoms since onset: Unchanged   Current / Previous Interventions    Diagnostic Tests: X-ray;Other   X-ray Results unremarkable   Other diagnostic tests US  (see pt's chart for details of US)   Prior Level of Function   Prior Level of Function-Mobility Ind   Prior Level of Function-ADLs Ind   Current Level of Function   Current Community Support Family/friend caregiver   Patient role/employment history A. 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 No   Have you fallen and had an injury in the past year? No   Is patient a fall risk? No;Department fall risk interventions implemented   Abuse Screen (yes response referral indicated)   Feels Unsafe at Home or Work/School no   Feels Threatened by Someone no   Does Anyone Try to Keep You From Having Contact with Others or Doing Things Outside Your Home? no   Physical Signs of Abuse Present no   Functional Scales   Functional Scales Other   Other Scales  PSFS   Knee Objective Findings   Side (if bilateral, select both right and left) Left   Integumentary  no bruising or selling in the sore area. painful region is not red or warm to the touch   Gait/Locomotion ambulates moderately favoring L LE    Knee ROM Comment normal range   Palpation no reproduced symptoms when squeezing calf in shortened and lengthened positions.    Left Knee Flexion Strength 4-/5   Left Knee Extension Strength 4/5   Left Hip Abduction Strength 4/5   Left Hamstring Flexibility demo's good flexibility but pt states her L hamstring feels very tight compared to R when stretching   Planned Therapy Interventions   Planned Therapy Interventions balance training;gait training;joint mobilization;manual therapy;motor coordination training;neuromuscular re-education;strengthening;stretching    Planned Modality Interventions   Planned Modality Interventions Cryotherapy;Hot packs;Ultrasound   Clinical Impression   Criteria for Skilled Therapeutic Interventions Met yes, treatment indicated   PT Diagnosis L LE pain   Influenced by the following impairments pain with ambulation, decreased L LE strength   Functional limitations due to impairments walking community distances, ie grocery shop   Clinical Presentation Stable/Uncomplicated   Clinical Decision Making (Complexity) Low complexity   Therapy Frequency 2 times/Week   Predicted Duration of Therapy Intervention (days/wks) 8 weeks   Risk & Benefits of therapy have been explained Yes   Patient, Family & other staff in agreement with plan of care Yes   Clinical Impression Comments L LE pain which is interupting pt's normal daily tasks at home and at work   Education Assessment   Preferred Learning Style Listening;Reading;Demonstration;Pictures/video   Barriers to Learning No barriers   ORTHO GOALS   PT Ortho Eval Goals 1;2;3   Ortho Goal 1   Goal Identifier Pain   Goal Description Pt to subjectively state a max L LE pain of 3/10 while working for improved job performance   Target Date 09/17/20   Ortho Goal 2   Goal Identifier Strength   Goal Description Pt to demo L knee and hip abd strength of 5-/5 or higher for improved strength and endurance   Target Date 09/17/20   Ortho Goal 3   Goal Identifier Ambulation   Goal Description Pt to ambulate community distances, ie grocery shop, without increasing L LE pain   Target Date 09/17/20   Total Evaluation Time   PT Eval, Low Complexity Minutes (78139) 15   Therapy Certification   Certification date from 07/23/20   Certification date to 09/17/20   Medical Diagnosis M79.605 (ICD-10-CM) - Left leg pain; M25.572 (ICD-10-CM) - Pain in joint involving ankle and foot, left

## 2020-07-23 NOTE — PROGRESS NOTES
[facility]          OUTPATIENT PHYSICAL THERAPY ORTHOPEDIC EVALUATION  PLAN OF TREATMENT FOR OUTPATIENT REHABILITATION  (COMPLETE FOR INITIAL CLAIMS ONLY)  Patient's Last Name, First Name, M.I.  YOB: 1945  Rebekah Shirley    Provider s Name:  [facility]   Medical Record No.  [id number]   Start of Care Date:  07/23/20   Onset Date:  07/14/20   Type:     _X__PT   ___OT   ___SLP Medical Diagnosis:  M79.605 (ICD-10-CM) - Left leg pain; M25.572 (ICD-10-CM) - Pain in joint involving ankle and foot, left      PT Diagnosis:  (P) L LE pain   Visits from SOC:  1      _________________________________________________________________________________  Plan of Treatment/Functional Goals:  (P) balance training, gait training, joint mobilization, manual therapy, motor coordination training, neuromuscular re-education, strengthening, stretching     (P) Cryotherapy, Hot packs, Ultrasound     Goals  Goal Identifier: (P) Pain  Goal Description: (P) Pt to subjectively state a max L LE pain of 3/10 while working for improved job performance  Target Date: (P) 09/17/20    Goal Identifier: (P) Strength  Goal Description: (P) Pt to demo L knee and hip abd strength of 5-/5 or higher for improved strength and endurance  Target Date: (P) 09/17/20    Goal Identifier: (P) Ambulation  Goal Description: (P) Pt to ambulate community distances, ie grocery shop, without increasing L LE pain  Target Date: (P) 09/17/20        Therapy Frequency:  (P) 2 times/Week  Predicted Duration of Therapy Intervention:  (P) 8 weeks    Neville Monsalve, PT, ATC                 I CERTIFY THE NEED FOR THESE SERVICES FURNISHED UNDER        THIS PLAN OF TREATMENT AND WHILE UNDER MY CARE     (Physician co-signature of this document indicates review and certification of the therapy plan).                       Certification Date From:  07/23/20   Certification Date To:  09/17/20    Referring Provider:  DEMETRIA  Bridgett    Initial Assessment        See Epic Evaluation Start of Care Date: 07/23/20

## 2020-07-23 NOTE — TELEPHONE ENCOUNTER
Called and left message for a return call. Sharon Perera LPN .......................7/23/2020  8:49 AM

## 2020-07-24 RX ORDER — METOPROLOL SUCCINATE 50 MG/1
50 TABLET, EXTENDED RELEASE ORAL DAILY
Qty: 90 TABLET | Refills: 0 | Status: SHIPPED | OUTPATIENT
Start: 2020-07-24 | End: 2020-10-12

## 2020-07-24 RX ORDER — AMLODIPINE BESYLATE 5 MG/1
5 TABLET ORAL DAILY
Qty: 90 TABLET | Refills: 0 | Status: SHIPPED | OUTPATIENT
Start: 2020-07-24 | End: 2020-12-22

## 2020-07-24 RX ORDER — ROSUVASTATIN CALCIUM 20 MG/1
TABLET, COATED ORAL
Qty: 90 TABLET | Refills: 0 | Status: SHIPPED | OUTPATIENT
Start: 2020-07-24 | End: 2020-12-22

## 2020-07-24 NOTE — TELEPHONE ENCOUNTER
Patient is out of medication, will make appointment for annual visit, would like a short supply until can get in.  Shruthi Crespo LPN ...... 7/24/2020 10:27 AM

## 2020-08-04 NOTE — PROGRESS NOTES
Outpatient Physical Therapy Discharge Note     Patient: Rebekah Shirley  : 1945    Beginning/End Dates:  20    Referring Provider: DEMETRIA Rankin Diagnosis: M79.605 (ICD-10-CM) - Left leg pain; M25.572 (ICD-10-CM) - Pain in joint involving ankle and foot, left      Plan:  Discharge from therapy.    Discharge:   Pt called and canceled her remaining sessions due to her high copay. Please refer to pt's chart for any additional information. This is an unplanned DC    Reason for Discharge: Patient chooses to discontinue therapy.  Patient has not scheduled further appointments.    Discharge Plan: Patient to continue home program.

## 2020-09-14 NOTE — TELEPHONE ENCOUNTER
It looks as patient has a new PCP and medication was filled. Will close note.  Sharon Perera LPN .......................9/14/2020  1:27 PM

## 2020-10-12 ENCOUNTER — OFFICE VISIT (OUTPATIENT)
Dept: FAMILY MEDICINE | Facility: OTHER | Age: 75
End: 2020-10-12
Attending: FAMILY MEDICINE
Payer: COMMERCIAL

## 2020-10-12 VITALS
DIASTOLIC BLOOD PRESSURE: 50 MMHG | TEMPERATURE: 98.9 F | SYSTOLIC BLOOD PRESSURE: 110 MMHG | HEART RATE: 78 BPM | WEIGHT: 162.8 LBS | BODY MASS INDEX: 32.81 KG/M2 | RESPIRATION RATE: 20 BRPM | OXYGEN SATURATION: 97 %

## 2020-10-12 DIAGNOSIS — M05.741 RHEUMATOID ARTHRITIS INVOLVING BOTH HANDS WITH POSITIVE RHEUMATOID FACTOR (H): Primary | ICD-10-CM

## 2020-10-12 DIAGNOSIS — Z23 FLU VACCINE NEED: ICD-10-CM

## 2020-10-12 DIAGNOSIS — M05.742 RHEUMATOID ARTHRITIS INVOLVING BOTH HANDS WITH POSITIVE RHEUMATOID FACTOR (H): Primary | ICD-10-CM

## 2020-10-12 PROCEDURE — G0463 HOSPITAL OUTPT CLINIC VISIT: HCPCS

## 2020-10-12 PROCEDURE — 90662 IIV NO PRSV INCREASED AG IM: CPT

## 2020-10-12 PROCEDURE — 99213 OFFICE O/P EST LOW 20 MIN: CPT | Performed by: FAMILY MEDICINE

## 2020-10-12 PROCEDURE — G0463 HOSPITAL OUTPT CLINIC VISIT: HCPCS | Mod: 25

## 2020-10-12 PROCEDURE — G0008 ADMIN INFLUENZA VIRUS VAC: HCPCS

## 2020-10-12 RX ORDER — TRAMADOL HYDROCHLORIDE 50 MG/1
50 TABLET ORAL 3 TIMES DAILY PRN
Qty: 90 TABLET | Refills: 1 | Status: SHIPPED | OUTPATIENT
Start: 2020-10-12 | End: 2020-12-11

## 2020-10-12 ASSESSMENT — PATIENT HEALTH QUESTIONNAIRE - PHQ9
5. POOR APPETITE OR OVEREATING: NOT AT ALL
SUM OF ALL RESPONSES TO PHQ QUESTIONS 1-9: 0

## 2020-10-12 ASSESSMENT — ANXIETY QUESTIONNAIRES
1. FEELING NERVOUS, ANXIOUS, OR ON EDGE: NOT AT ALL
3. WORRYING TOO MUCH ABOUT DIFFERENT THINGS: NOT AT ALL
GAD7 TOTAL SCORE: 0
7. FEELING AFRAID AS IF SOMETHING AWFUL MIGHT HAPPEN: NOT AT ALL
5. BEING SO RESTLESS THAT IT IS HARD TO SIT STILL: NOT AT ALL
6. BECOMING EASILY ANNOYED OR IRRITABLE: NOT AT ALL
2. NOT BEING ABLE TO STOP OR CONTROL WORRYING: NOT AT ALL

## 2020-10-12 ASSESSMENT — PAIN SCALES - GENERAL: PAINLEVEL: WORST PAIN (10)

## 2020-10-13 PROBLEM — R53.83 OTHER FATIGUE: Status: RESOLVED | Noted: 2019-06-25 | Resolved: 2020-10-13

## 2020-10-13 PROBLEM — R06.09 DYSPNEA ON EXERTION: Status: RESOLVED | Noted: 2019-05-09 | Resolved: 2020-10-13

## 2020-10-13 PROBLEM — Z79.899 ON STATIN THERAPY: Status: RESOLVED | Noted: 2019-05-08 | Resolved: 2020-10-13

## 2020-10-13 PROBLEM — Z79.899 CONTROLLED SUBSTANCE AGREEMENT SIGNED: Status: RESOLVED | Noted: 2017-07-13 | Resolved: 2020-10-13

## 2020-10-13 PROBLEM — R07.9 CHEST PAIN, UNSPECIFIED TYPE: Status: RESOLVED | Noted: 2019-05-09 | Resolved: 2020-10-13

## 2020-10-13 ASSESSMENT — ANXIETY QUESTIONNAIRES: GAD7 TOTAL SCORE: 0

## 2020-10-13 NOTE — PROGRESS NOTES
SUBJECTIVE:   Rebekah Shirley is a 75 year old female who presents to clinic today for the following health issues: Pain    Patient arrives here for pain.sHe has a long history of rheumatoid arthritis.  Especially well with needles and rest.  She has trouble with eating.  Hands are constantly swollen.  She does see rheumatology.  This is methotrexate.  Patient also takes prednisone on a chronic basis.  She recently brought up pain with her rheumatologist and was advised she needs to see her primary physician.  She is getting shots.  She is Aleve ibuprofen Tylenol without much relief.  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  Patient has been on medication in the past for pain but cannot recall what type.        Patient Active Problem List    Diagnosis Date Noted     Non-occlusive coronary artery disease based on a cardiac catheterization through [facility] in 2019     Priority: Medium     Hx of cardiac catheterization  through [facility] without stenting 2019     Priority: Medium     Aortic atherosclerosis (H) 2019     Priority: Medium     Elevated coronary artery calcium score on 2019 at 606.7  2019     Priority: Medium     History of tobacco abuse  2019     Priority: Medium     Mixed hyperlipidemia 2018     Priority: Medium     Rheumatoid arthritis (H) 2013     Priority: Medium     Anxiety state 2013     Priority: Medium     Past Medical History:   Diagnosis Date     Hyperlipidemia     No Comments Provided     Nicotine dependence, uncomplicated     No Comments Provided     Personal history of other medical treatment (CODE)      4, para 3.     Rheumatoid arthritis (H)       Past Surgical History:   Procedure Laterality Date     APPENDECTOMY OPEN      Incidental appendectomy at time of adhesion lysis.     COLONOSCOPY      06,Colonoscopy (screening), normal, Dr. Orellana     LAPAROSCOPIC TUBAL LIGATION       1982     LAPAROTOMY EXPLORATORY      History of partial small bowel obstruction necessitating exploratory laparotomy.  Small area of adhesions with secondary lysis felt responsible for the partial obstruction.     No Known Allergies    Review of Systems     OBJECTIVE:     /50   Pulse 78   Temp 98.9  F (37.2  C)   Resp 20   Wt 73.8 kg (162 lb 12.8 oz)   SpO2 97%   BMI 32.81 kg/m    Body mass index is 32.81 kg/m .  Physical Exam  Constitutional:       Appearance: Normal appearance.   Pulmonary:      Effort: Pulmonary effort is normal.   Musculoskeletal:      Comments: She has ulnar deviation.  Fingers and joints especially the MP joints are markedly swollen.   Neurological:      Mental Status: She is alert.         Diagnostic Test Results:  none     ASSESSMENT/PLAN:         1. Rheumatoid arthritis involving both hands with positive rheumatoid factor (H)  I discussed with patient that there is multiple different medications we could use.  She is not getting good relief with the basic over-the-counter's.  We will advanced tramadol if patient does not get good relief consider advancing to hydrocodone.  The tramadol be taken 3 times a day 1 in the morning 1 around noon and 1 in the evening.  Patient will follow-up when she needs a refill to either refill or discuss advancing to more stronger pain medication.  - traMADol (ULTRAM) 50 MG tablet; Take 1 tablet (50 mg) by mouth 3 times daily as needed for severe pain  Dispense: 90 tablet; Refill: 1    2. Flu vaccine need  Updated  - [facility]-   FLU VACCINE, INCREASED ANTIGEN, PRESV FREE      Vincent Fuller MD  [facility]

## 2020-12-10 ENCOUNTER — ALLIED HEALTH/NURSE VISIT (OUTPATIENT)
Dept: FAMILY MEDICINE | Facility: OTHER | Age: 75
End: 2020-12-10
Attending: FAMILY MEDICINE
Payer: COMMERCIAL

## 2020-12-10 DIAGNOSIS — J02.9 SORE THROAT: Primary | ICD-10-CM

## 2020-12-10 PROCEDURE — U0003 INFECTIOUS AGENT DETECTION BY NUCLEIC ACID (DNA OR RNA); SEVERE ACUTE RESPIRATORY SYNDROME CORONAVIRUS 2 (SARS-COV-2) (CORONAVIRUS DISEASE [COVID-19]), AMPLIFIED PROBE TECHNIQUE, MAKING USE OF HIGH THROUGHPUT TECHNOLOGIES AS DESCRIBED BY CMS-2020-01-R: HCPCS | Mod: ZL | Performed by: FAMILY MEDICINE

## 2020-12-10 PROCEDURE — C9803 HOPD COVID-19 SPEC COLLECT: HCPCS

## 2020-12-10 NOTE — PROGRESS NOTES
Chief Complaint   Patient presents with     Covid 19 Testing     Symptoms      Sore throat, throwing up, diarrhea     Medication Reconciliation: complete    Ashwini Clinton LPN

## 2020-12-11 LAB
SARS-COV-2 RNA SPEC QL NAA+PROBE: NOT DETECTED
SPECIMEN SOURCE: NORMAL

## 2020-12-15 ENCOUNTER — OFFICE VISIT (OUTPATIENT)
Dept: FAMILY MEDICINE | Facility: OTHER | Age: 75
End: 2020-12-15
Attending: PHYSICIAN ASSISTANT
Payer: COMMERCIAL

## 2020-12-15 VITALS
OXYGEN SATURATION: 95 % | HEART RATE: 97 BPM | RESPIRATION RATE: 14 BRPM | WEIGHT: 158.4 LBS | HEIGHT: 60 IN | DIASTOLIC BLOOD PRESSURE: 66 MMHG | SYSTOLIC BLOOD PRESSURE: 138 MMHG | TEMPERATURE: 98.4 F | BODY MASS INDEX: 31.1 KG/M2

## 2020-12-15 DIAGNOSIS — R51.9 BILATERAL HEADACHE: Primary | ICD-10-CM

## 2020-12-15 DIAGNOSIS — R42 DIZZINESS: ICD-10-CM

## 2020-12-15 DIAGNOSIS — R05.9 COUGH: ICD-10-CM

## 2020-12-15 LAB
ANION GAP SERPL CALCULATED.3IONS-SCNC: 8 MMOL/L (ref 3–14)
BUN SERPL-MCNC: 16 MG/DL (ref 7–25)
CALCIUM SERPL-MCNC: 8.7 MG/DL (ref 8.6–10.3)
CHLORIDE SERPL-SCNC: 105 MMOL/L (ref 98–107)
CO2 SERPL-SCNC: 25 MMOL/L (ref 21–31)
CREAT SERPL-MCNC: 0.68 MG/DL (ref 0.6–1.2)
ERYTHROCYTE [DISTWIDTH] IN BLOOD BY AUTOMATED COUNT: 13.4 % (ref 10–15)
GFR SERPL CREATININE-BSD FRML MDRD: 85 ML/MIN/{1.73_M2}
GLUCOSE SERPL-MCNC: 139 MG/DL (ref 70–105)
HCT VFR BLD AUTO: 35.1 % (ref 35–47)
HGB BLD-MCNC: 11.3 G/DL (ref 11.7–15.7)
MCH RBC QN AUTO: 31 PG (ref 26.5–33)
MCHC RBC AUTO-ENTMCNC: 32.2 G/DL (ref 31.5–36.5)
MCV RBC AUTO: 96 FL (ref 78–100)
PLATELET # BLD AUTO: 329 10E9/L (ref 150–450)
POTASSIUM SERPL-SCNC: 4.4 MMOL/L (ref 3.5–5.1)
RBC # BLD AUTO: 3.65 10E12/L (ref 3.8–5.2)
SODIUM SERPL-SCNC: 138 MMOL/L (ref 134–144)
WBC # BLD AUTO: 8.1 10E9/L (ref 4–11)

## 2020-12-15 PROCEDURE — G0463 HOSPITAL OUTPT CLINIC VISIT: HCPCS

## 2020-12-15 PROCEDURE — 36415 COLL VENOUS BLD VENIPUNCTURE: CPT | Mod: ZL | Performed by: PHYSICIAN ASSISTANT

## 2020-12-15 PROCEDURE — 85027 COMPLETE CBC AUTOMATED: CPT | Mod: ZL | Performed by: PHYSICIAN ASSISTANT

## 2020-12-15 PROCEDURE — 99213 OFFICE O/P EST LOW 20 MIN: CPT | Performed by: PHYSICIAN ASSISTANT

## 2020-12-15 PROCEDURE — 80048 BASIC METABOLIC PNL TOTAL CA: CPT | Mod: ZL | Performed by: PHYSICIAN ASSISTANT

## 2020-12-15 ASSESSMENT — ENCOUNTER SYMPTOMS
DIARRHEA: 0
COUGH: 1
FEVER: 0
VOMITING: 0
PHOTOPHOBIA: 0
DIZZINESS: 1
CHILLS: 0
SPEECH DIFFICULTY: 0
SINUS PAIN: 0
WHEEZING: 0
FATIGUE: 0
SHORTNESS OF BREATH: 0
NAUSEA: 0
APPETITE CHANGE: 1
HEMATOCHEZIA: 0
PARESTHESIAS: 0
PALPITATIONS: 0
TREMORS: 0
HEADACHES: 1
CONSTIPATION: 0
WEAKNESS: 0
NUMBNESS: 0
CHEST TIGHTNESS: 0
HEARTBURN: 1
ACTIVITY CHANGE: 0
LIGHT-HEADEDNESS: 0
SORE THROAT: 0
FACIAL ASYMMETRY: 0
SINUS PRESSURE: 0
ABDOMINAL PAIN: 1

## 2020-12-15 ASSESSMENT — MIFFLIN-ST. JEOR: SCORE: 1137.06

## 2020-12-15 NOTE — PROGRESS NOTES
Nursing Notes:   Kelli Becerra LPN  12/15/2020 10:51 AM  Addendum  Patient presents to clinic with headache that she has had for the past 4 days.  Kelli Becerra LPN ....................  12/15/2020   10:45 AM        SUBJECTIVE:     HPI  Rebekah Shirley is a 75 year old female with history of hyperlipidemia, aortic atherosclerosis, non-occlusive CAD, and rheumatoid arthritis who presents to clinic today for evaluation of headache concerns. States last Wednesday she developed a headache, upset stomach, decreased appetite, cough, sneezing, and occasional dizziness. Patient was tested for COVID on 12/10/2020 and results were negative. She notes continued headache that she describes as bilateral temporal region. Headache is always there but sometimes the pain is more intense. Has been taking Tylenol which provides mild symptomatic relief. Has not tried anything else for symptomatic relief. Drinking plenty of water. Eating well but notes a decreased appetite. Did have a co-worker test positive for COVID recently, but patient states she was not in prolonged close contact with that individual. No other known sick contacts.     Patient also states she has been struggling with epigastric pain, reflux, and burning sensation for the past month or so. She is unsure how often it comes, what makes it worse. Has not tried anything for symptomatic relief.     Review of Systems   Constitutional: Positive for appetite change. Negative for activity change, chills, fatigue and fever.   HENT: Positive for sneezing. Negative for congestion, ear discharge, ear pain, sinus pressure, sinus pain and sore throat.    Eyes: Negative for photophobia and visual disturbance.   Respiratory: Positive for cough. Negative for chest tightness, shortness of breath and wheezing.    Cardiovascular: Negative for chest pain, palpitations and peripheral edema.   Gastrointestinal: Positive for abdominal pain and heartburn. Negative for constipation,  diarrhea, hematochezia, nausea and vomiting.   Genitourinary: Negative.    Neurological: Positive for dizziness and headaches. Negative for tremors, syncope, facial asymmetry, speech difficulty, weakness, light-headedness, numbness and paresthesias.          PAST MEDICAL HISTORY:   Past Medical History:   Diagnosis Date     Hyperlipidemia     No Comments Provided     Nicotine dependence, uncomplicated     No Comments Provided     Personal history of other medical treatment (CODE)      4, para 3.     Rheumatoid arthritis (H)        PAST SURGICAL HISTORY:   Past Surgical History:   Procedure Laterality Date     APPENDECTOMY OPEN      Incidental appendectomy at time of adhesion lysis.     COLONOSCOPY      06,Colonoscopy (screening), normal, Dr. Orellana     LAPAROSCOPIC TUBAL LIGATION           LAPAROTOMY EXPLORATORY      History of partial small bowel obstruction necessitating exploratory laparotomy.  Small area of adhesions with secondary lysis felt responsible for the partial obstruction.       FAMILY HISTORY:   Family History   Problem Relation Age of Onset     Heart Disease Father 58        MI     Arthritis Mother      Hyperlipidemia Mother      Other - See Comments Sister         Occupational lung disease     Other - See Comments Brother         Laryngeal cancer     Heart Disease Sister         Surgery for heart       SOCIAL HISTORY:   Social History     Tobacco Use     Smoking status: Former Smoker     Packs/day: 0.10     Quit date: 2008     Years since quittin.1     Smokeless tobacco: Never Used   Substance Use Topics     Alcohol use: No     Frequency: Never      No Known Allergies  Current Outpatient Medications   Medication     amLODIPine (NORVASC) 5 MG tablet     aspirin (ASA) 81 MG chewable tablet     folic acid (FOLVITE) 1 MG tablet     methotrexate 2.5 MG tablet CHEMO     nitroGLYcerin (NITROSTAT) 0.4 MG sublingual tablet     order for DME     predniSONE (DELTASONE) 5 MG tablet  "    rosuvastatin (CRESTOR) 20 MG tablet     sulfaSALAzine (AZULFIDINE) 500 MG tablet     No current facility-administered medications for this visit.        OBJECTIVE:     /66   Pulse 97   Temp 98.4  F (36.9  C)   Resp 14   Ht 1.511 m (4' 11.5\")   Wt 71.8 kg (158 lb 6.4 oz)   SpO2 95%   Breastfeeding No   BMI 31.46 kg/m    Body mass index is 31.46 kg/m .  Physical Exam  General: Pleasant, in no apparent distress.  Eyes: Sclera are white and conjunctiva are clear bilaterally. Lacrimal apparatus free of erythema, edema, and discharge bilaterally.  Ears: External ears without erythema or edema. Tympanic membranes are pearly white and without erythema, scarring or perforations bilaterally. External auditory canals are free of foreign bodies, erythema, ulcers, and masses.  Nose: External nose is symmetrical and free of lesions and deformities. Mucosa is soft pink and without erythema, edema, bleeding, or exudate. No septal perforation or deviation.  Oropharynx: Oral mucosa is pink and without ulcers, nodules, and white patches. Tongue is symmetrical, pink, and without masses or lesions. Pharynx is pink, symmetrical, and without lesions. Uvula is midline. Tonsils are pink, symmetrical, and without edema, ulcers, or exudates, and 1+ bilaterally.  Neck: No cervical lymphadenopathy on inspection and palpation.  Cardiovascular: Regular rate and rhythm with S1 equal to S2. No murmurs, friction rubs, or gallops.   Respiratory: Lungs are resonant and clear to auscultation bilaterally. No wheezes, crackles, or rhonchi.  NEUROLOGICAL:  Brigette score of 15.  Cranial nerves grossly tested and intact without deficit.  No gross muscle wasting and can ambulate and get onto exam table unassisted. Sensation to light touch intact.  No deficit with nose to finger rapid alternating movement.   No pronator drift. Speech clear.  Answers questions appropriately.    Skin: No jaundice, pallor, rashes, or lesions.  Psych: " Appropriate mood and affect.      Results for orders placed or performed in visit on 12/15/20   Basic Metabolic Panel     Status: Abnormal   Result Value Ref Range    Sodium 138 134 - 144 mmol/L    Potassium 4.4 3.5 - 5.1 mmol/L    Chloride 105 98 - 107 mmol/L    Carbon Dioxide 25 21 - 31 mmol/L    Anion Gap 8 3 - 14 mmol/L    Glucose 139 (H) 70 - 105 mg/dL    Urea Nitrogen 16 7 - 25 mg/dL    Creatinine 0.68 0.60 - 1.20 mg/dL    GFR Estimate 85 >60 mL/min/[1.73_m2]    GFR Estimate If Black >90 >60 mL/min/[1.73_m2]    Calcium 8.7 8.6 - 10.3 mg/dL   CBC W PLT No Diff     Status: Abnormal   Result Value Ref Range    WBC 8.1 4.0 - 11.0 10e9/L    RBC Count 3.65 (L) 3.8 - 5.2 10e12/L    Hemoglobin 11.3 (L) 11.7 - 15.7 g/dL    Hematocrit 35.1 35.0 - 47.0 %    MCV 96 78 - 100 fl    MCH 31.0 26.5 - 33.0 pg    MCHC 32.2 31.5 - 36.5 g/dL    RDW 13.4 10.0 - 15.0 %    Platelet Count 329 150 - 450 10e9/L         ASSESSMENT/PLAN:     1. Bilateral headache    2. Dizziness    3. Cough      Differential includes viral illness, COVID, tension headache, migraine, allergies, dehydration, neurologic cause such as TIA, stroke, hemorrhage, cardiac cause, etc. Exam is within normal limits and reassuring. Ordered CBC and BMP for additional evaluation and results are unremarkable as above. No red flag indications for imaging. At this time, symptoms and exam most consistent with a viral illness. I recommend symptomatic management. Gave warning signs and symptoms for need to return to clinic or ED for additional evaluation.       Kenzie Haney PA-C  [facility]

## 2020-12-15 NOTE — NURSING NOTE
Patient presents to clinic with headache that she has had for the past 4 days.  Kelli Becerra LPN ....................  12/15/2020   10:45 AM

## 2020-12-21 ENCOUNTER — HOSPITAL ENCOUNTER (OUTPATIENT)
Dept: GENERAL RADIOLOGY | Facility: OTHER | Age: 75
End: 2020-12-21
Attending: FAMILY MEDICINE
Payer: COMMERCIAL

## 2020-12-21 ENCOUNTER — OFFICE VISIT (OUTPATIENT)
Dept: FAMILY MEDICINE | Facility: OTHER | Age: 75
End: 2020-12-21
Attending: FAMILY MEDICINE
Payer: COMMERCIAL

## 2020-12-21 VITALS
SYSTOLIC BLOOD PRESSURE: 128 MMHG | DIASTOLIC BLOOD PRESSURE: 64 MMHG | TEMPERATURE: 98.2 F | OXYGEN SATURATION: 96 % | RESPIRATION RATE: 16 BRPM | HEIGHT: 60 IN | BODY MASS INDEX: 31.22 KG/M2 | HEART RATE: 90 BPM | WEIGHT: 159 LBS

## 2020-12-21 DIAGNOSIS — M25.522 LEFT ELBOW PAIN: ICD-10-CM

## 2020-12-21 DIAGNOSIS — M25.512 ACUTE PAIN OF LEFT SHOULDER: ICD-10-CM

## 2020-12-21 DIAGNOSIS — M54.2 NECK PAIN: ICD-10-CM

## 2020-12-21 DIAGNOSIS — M54.2 NECK PAIN: Primary | ICD-10-CM

## 2020-12-21 PROCEDURE — G0463 HOSPITAL OUTPT CLINIC VISIT: HCPCS

## 2020-12-21 PROCEDURE — 99213 OFFICE O/P EST LOW 20 MIN: CPT | Performed by: FAMILY MEDICINE

## 2020-12-21 PROCEDURE — 73030 X-RAY EXAM OF SHOULDER: CPT | Mod: LT

## 2020-12-21 PROCEDURE — 73080 X-RAY EXAM OF ELBOW: CPT | Mod: LT

## 2020-12-21 PROCEDURE — 72040 X-RAY EXAM NECK SPINE 2-3 VW: CPT

## 2020-12-21 ASSESSMENT — ENCOUNTER SYMPTOMS
PARESTHESIAS: 0
NUMBNESS: 0

## 2020-12-21 ASSESSMENT — PAIN SCALES - GENERAL: PAINLEVEL: SEVERE PAIN (7)

## 2020-12-21 ASSESSMENT — MIFFLIN-ST. JEOR: SCORE: 1139.78

## 2020-12-21 NOTE — PROGRESS NOTES
"  SUBJECTIVE:   Rebekah Shirley is a 75 year old female who presents to clinic today for the following health issues:    HPI  Left Arm Pain:  Was bringing a wreath to the [place]em[place] yesterday and fell on a patch of ice.  She reports landing onto her left elbow and her knees.  She could not get up and had to \"belly [her] way\" back to her car. She has not been evaluated before now.  She reports pain along the lateral aspect of her upper arm and radiating to her left shoulder and into her neck.  No significant knee pain.  Denies head trauma or loss of consciousness.    Past Medical History:   Diagnosis Date     Hyperlipidemia     No Comments Provided     Nicotine dependence, uncomplicated     No Comments Provided     Personal history of other medical treatment (CODE)      4, para 3.     Rheumatoid arthritis (H)       Past Surgical History:   Procedure Laterality Date     APPENDECTOMY OPEN      Incidental appendectomy at time of adhesion lysis.     COLONOSCOPY      06,Colonoscopy (screening), normal, Dr. Orellana     LAPAROSCOPIC TUBAL LIGATION           LAPAROTOMY EXPLORATORY      History of partial small bowel obstruction necessitating exploratory laparotomy.  Small area of adhesions with secondary lysis felt responsible for the partial obstruction.     Family History   Problem Relation Age of Onset     Heart Disease Father 58        MI     Arthritis Mother      Hyperlipidemia Mother      Other - See Comments Sister         Occupational lung disease     Other - See Comments Brother         Laryngeal cancer     Heart Disease Sister         Surgery for heart     Social History     Tobacco Use     Smoking status: Former Smoker     Packs/day: 0.10     Quit date: 2008     Years since quittin.1     Smokeless tobacco: Never Used   Substance Use Topics     Alcohol use: No     Frequency: Never     Current Outpatient Medications   Medication Sig Dispense Refill     amLODIPine (NORVASC) 5 MG tablet Take 1 " "tablet (5 mg) by mouth daily 90 tablet 0     aspirin (ASA) 81 MG chewable tablet Take 1 tablet (81 mg) by mouth daily 90 tablet 3     folic acid (FOLVITE) 1 MG tablet Take 1 mg by mouth daily       methotrexate 2.5 MG tablet CHEMO Take 25 mg by mouth every 7 days       nitroGLYcerin (NITROSTAT) 0.4 MG sublingual tablet Place 1 tablet (0.4 mg) under the tongue every 5 minutes as needed for chest pain 20 tablet 0     order for DME Equipment being ordered: ankle brace 1 each 0     predniSONE (DELTASONE) 5 MG tablet TAKE 1 TABLET EVERY DAY       rosuvastatin (CRESTOR) 20 MG tablet TAKE 1 TABLET EVERY DAY 90 tablet 0     sulfaSALAzine (AZULFIDINE) 500 MG tablet TAKE 2 TABLETS BY MOUTH EVERY MORNING AND 3 TABLETS EVERY EVENING       No Known Allergies    Review of Systems   Neurological: Negative for numbness and paresthesias.      OBJECTIVE:     /64   Pulse 90   Temp 98.2  F (36.8  C) (Temporal)   Resp 16   Ht 1.511 m (4' 11.5\")   Wt 72.1 kg (159 lb)   SpO2 96%   BMI 31.58 kg/m    Body mass index is 31.58 kg/m .  Physical Exam  Constitutional:       General: She is not in acute distress.     Appearance: Normal appearance. She is not ill-appearing.   Musculoskeletal:      Comments: Tenderness to palpation along cervical paraspinal musculature on left and of left trapezius muscle.  Tenderness to palpation of left shoulder and elbow joints.  Active range of motion limited to 90 degrees in flexion and abduction secondary to pain.   Neurological:      Mental Status: She is alert.      Comments:  strength normal and equal bilaterally.   Psychiatric:         Mood and Affect: Mood normal.     Diagnostic Test Results:  XR Cervical Spine:  Degenerative changes at C5-C6 and C6-C7 level.  XR Shoulder LT:  Chronic rotator cuff disease with narrowing of the acromiohumeral distance.  XR Elbow LT:  No acute elbow abnormality.    ASSESSMENT/PLAN:     1. Neck pain  2. Acute pain of left shoulder  3. Left elbow pain  XR " with chronic degenerative changes in cervical spine and chronic rotator cuff disease of left shoulder.  Referral to physical therapy for further evaluation and treatment.  Recommend conservative management with Tylenol, OTC topical pain relief medications, and ice/heat as needed.  Follow-up if symptoms worsening or failing to improve.  - XR Cervical Spine 2/3 Views; Future  - XR Shoulder Left 2 Views; Future  - XR Elbow Left G/E 3 Views; Future  - PHYSICAL THERAPY REFERRAL; Future      [facility]

## 2020-12-21 NOTE — NURSING NOTE
"Chief Complaint   Patient presents with     Arm Injury     Left DOI- 12/20/20     slipped on ice and fell yesterday.    Initial /64   Pulse 90   Temp 98.2  F (36.8  C) (Temporal)   Resp 16   Ht 1.511 m (4' 11.5\")   Wt 72.1 kg (159 lb)   SpO2 96%   BMI 31.58 kg/m   Estimated body mass index is 31.58 kg/m  as calculated from the following:    Height as of this encounter: 1.511 m (4' 11.5\").    Weight as of this encounter: 72.1 kg (159 lb).         Medication Reconciliation: Complete      Norma J. Gosselin, LPN   "

## 2020-12-22 ENCOUNTER — OFFICE VISIT (OUTPATIENT)
Dept: CARDIOLOGY | Facility: OTHER | Age: 75
End: 2020-12-22
Attending: INTERNAL MEDICINE
Payer: COMMERCIAL

## 2020-12-22 ENCOUNTER — HOSPITAL ENCOUNTER (OUTPATIENT)
Dept: PHYSICAL THERAPY | Facility: OTHER | Age: 75
Setting detail: THERAPIES SERIES
End: 2020-12-22
Attending: FAMILY MEDICINE
Payer: COMMERCIAL

## 2020-12-22 VITALS
BODY MASS INDEX: 32.05 KG/M2 | WEIGHT: 159 LBS | TEMPERATURE: 97 F | RESPIRATION RATE: 18 BRPM | OXYGEN SATURATION: 98 % | HEIGHT: 59 IN | DIASTOLIC BLOOD PRESSURE: 68 MMHG | HEART RATE: 88 BPM | SYSTOLIC BLOOD PRESSURE: 138 MMHG

## 2020-12-22 DIAGNOSIS — K21.00 GASTROESOPHAGEAL REFLUX DISEASE WITH ESOPHAGITIS WITHOUT HEMORRHAGE: ICD-10-CM

## 2020-12-22 DIAGNOSIS — M25.512 ACUTE PAIN OF LEFT SHOULDER: ICD-10-CM

## 2020-12-22 DIAGNOSIS — I25.10 NON-OCCLUSIVE CORONARY ARTERY DISEASE: ICD-10-CM

## 2020-12-22 DIAGNOSIS — M05.741 RHEUMATOID ARTHRITIS INVOLVING BOTH HANDS WITH POSITIVE RHEUMATOID FACTOR (H): ICD-10-CM

## 2020-12-22 DIAGNOSIS — I25.10 CORONARY ARTERY CALCIFICATION SEEN ON CAT SCAN: ICD-10-CM

## 2020-12-22 DIAGNOSIS — Z98.890 HX OF CARDIAC CATHETERIZATION: ICD-10-CM

## 2020-12-22 DIAGNOSIS — Z87.891 HISTORY OF TOBACCO ABUSE: ICD-10-CM

## 2020-12-22 DIAGNOSIS — R07.9 CHEST PAIN, UNSPECIFIED TYPE: Primary | ICD-10-CM

## 2020-12-22 DIAGNOSIS — R93.1 ELEVATED CORONARY ARTERY CALCIUM SCORE: ICD-10-CM

## 2020-12-22 DIAGNOSIS — M25.522 LEFT ELBOW PAIN: ICD-10-CM

## 2020-12-22 DIAGNOSIS — E78.2 MIXED HYPERLIPIDEMIA: ICD-10-CM

## 2020-12-22 DIAGNOSIS — M05.742 RHEUMATOID ARTHRITIS INVOLVING BOTH HANDS WITH POSITIVE RHEUMATOID FACTOR (H): ICD-10-CM

## 2020-12-22 DIAGNOSIS — I70.0 AORTIC ATHEROSCLEROSIS (H): ICD-10-CM

## 2020-12-22 DIAGNOSIS — M54.2 NECK PAIN: ICD-10-CM

## 2020-12-22 DIAGNOSIS — I20.89 ANGINA OF EFFORT (H): ICD-10-CM

## 2020-12-22 PROCEDURE — 97110 THERAPEUTIC EXERCISES: CPT | Mod: GP | Performed by: PHYSICAL THERAPIST

## 2020-12-22 PROCEDURE — G0463 HOSPITAL OUTPT CLINIC VISIT: HCPCS

## 2020-12-22 PROCEDURE — 97161 PT EVAL LOW COMPLEX 20 MIN: CPT | Mod: GP | Performed by: PHYSICAL THERAPIST

## 2020-12-22 PROCEDURE — 99214 OFFICE O/P EST MOD 30 MIN: CPT | Performed by: INTERNAL MEDICINE

## 2020-12-22 PROCEDURE — G0463 HOSPITAL OUTPT CLINIC VISIT: HCPCS | Mod: 25

## 2020-12-22 PROCEDURE — 93005 ELECTROCARDIOGRAM TRACING: CPT

## 2020-12-22 PROCEDURE — 93010 ELECTROCARDIOGRAM REPORT: CPT | Performed by: INTERNAL MEDICINE

## 2020-12-22 RX ORDER — PANTOPRAZOLE SODIUM 20 MG/1
40 TABLET, DELAYED RELEASE ORAL DAILY
Qty: 90 TABLET | Refills: 3 | Status: SHIPPED | OUTPATIENT
Start: 2020-12-22 | End: 2020-12-23

## 2020-12-22 RX ORDER — ASPIRIN 81 MG/1
81 TABLET, CHEWABLE ORAL DAILY
Qty: 90 TABLET | Refills: 3 | Status: SHIPPED | OUTPATIENT
Start: 2020-12-22

## 2020-12-22 RX ORDER — NITROGLYCERIN 0.4 MG/1
0.4 TABLET SUBLINGUAL EVERY 5 MIN PRN
Qty: 25 TABLET | Refills: 3 | Status: SHIPPED | OUTPATIENT
Start: 2020-12-22

## 2020-12-22 RX ORDER — ROSUVASTATIN CALCIUM 20 MG/1
TABLET, COATED ORAL
Qty: 90 TABLET | Refills: 3 | Status: SHIPPED | OUTPATIENT
Start: 2020-12-22 | End: 2021-12-13

## 2020-12-22 RX ORDER — AMLODIPINE BESYLATE 5 MG/1
5 TABLET ORAL DAILY
Qty: 90 TABLET | Refills: 3 | Status: SHIPPED | OUTPATIENT
Start: 2020-12-22

## 2020-12-22 ASSESSMENT — MIFFLIN-ST. JEOR: SCORE: 1138.97

## 2020-12-22 ASSESSMENT — PAIN SCALES - GENERAL: PAINLEVEL: NO PAIN (0)

## 2020-12-22 NOTE — NURSING NOTE
"Patient comes in for annual follow up.  Rosio Hussein LPN ....................12/22/2020   1:39 PM  Chief Complaint   Patient presents with     Follow Up     annual       Initial /68 (BP Location: Right arm, Patient Position: Sitting, Cuff Size: Adult Regular)   Pulse 88   Temp 97  F (36.1  C) (Tympanic)   Resp 18   Ht 1.51 m (4' 11.45\")   Wt 72.1 kg (159 lb)   SpO2 98%   BMI 31.63 kg/m   Estimated body mass index is 31.63 kg/m  as calculated from the following:    Height as of this encounter: 1.51 m (4' 11.45\").    Weight as of this encounter: 72.1 kg (159 lb).  Meds Reconciled: complete  Pt is on Aspirin  Pt is on a Statin  PHQ and/or KAYCEE reviewed. Pt referred to PCP/MH Provider as appropriate.    Rosio Hussein LPN      "

## 2020-12-22 NOTE — INITIAL ASSESSMENTS
12/22/20 1200   General Information   Type of Visit Initial OP Ortho PT Evaluation   Start of Care Date 12/22/20   Referring Physician ASCENCION Schumacher   Patient/Family Goals Statement return to work, house chores with less pain/limitations.   Orders Evaluate and Treat   Certification Required? Yes   Medical Diagnosis Neck pain M54.2, Acute pain of left shoulder M25.512 , Left elbow pain M25.522    Surgical/Medical history reviewed Yes   Body Part(s)   Body Part(s) Shoulder   Presentation and Etiology   Pertinent history of current problem (include personal factors and/or comorbidities that impact the POC) fell on ice on 12/20. fell on her left arm, slipped on ice. She reports no falls recently but on ice. Works in kitchen at [employer]. Has decreased ROM, pain, stretch, and some pain up into neck. but even in the last 2 days has improved a lot.    Functional Limitations perform activities of daily living;perform required work activities;perform desired leisure / sports activities   Symptom Location left shoulder   How/Where did it occur With a fall   Onset date of current episode/exacerbation 12/20/20   Chronicity New   Pain rating (0-10 point scale) Best (/10);Worst (/10)   Best (/10) 4   Worst (/10) 6   Pain quality A. Sharp;B. Dull   Frequency of pain/symptoms A. Constant   Pain/symptoms are: Worse during the day   Pain/symptoms exacerbated by C. Lifting;D. Carrying;G. Certain positions;H. Overhead reach   Pain/symptoms eased by C. Rest   Current / Previous Interventions   Diagnostic Tests: X-ray   X-ray Results   (cervical spine DDD/DJD, cuff degeneratie changes. )   Prior Level of Function   Prior Level of Function-Mobility overhead reach always limited   Prior Level of Function-ADLs don/doff, house chroes.    Current Level of Function   Current Community Support Family/friend caregiver   Fall Risk Screen   Fall screen completed by PT   Is patient a fall risk? No   Fall screen comments steady on her  feeet into clinic,    Abuse Screen (yes response referral indicated)   Feels Unsafe at Home or Work/School no   Feels Threatened by Someone no   Does Anyone Try to Keep You From Having Contact with Others or Doing Things Outside Your Home? no   Physical Signs of Abuse Present no   Shoulder Objective Findings   Side (if bilateral, select both right and left) Left   Observation rounded shoulders   Cervical Screen (ROM, quadrant) good ROM   Thoracic Mobility Screen tight side bend and extension   Pec Minor (supine) Flexibility tight   Neer's Test pain   Goliad's Test pain   Load and Shift Test pain   Palpation tighter delts on left, UT tight   Left Shoulder Flexion AROM 100   Left Shoulder Flexion PROM 140   Left Shoulder Abduction AROM 90   Left Shoulder Abduction PROM 130   Left Shoulder ER AROM 10   Left Shoulder IR AROM able to reach behind back but decreased IR ROM.   Left Shoulder Flexion Strength 4   Left Shoulder Abduction Strength 4   Left Shoulder ER Strength 3+   Left Shoulder IR Strength 4+   Left Shoulder Extension Strength 4+   Planned Therapy Interventions   Planned Therapy Interventions joint mobilization;manual therapy;neuromuscular re-education;ROM;strengthening;stretching   Planned Modality Interventions   Planned Modality Interventions Cryotherapy;Hot packs;Ultrasound   Clinical Impression   Criteria for Skilled Therapeutic Interventions Met yes, treatment indicated   PT Diagnosis cuff strain s/p fall, decreased ROM and strength   Influenced by the following impairments decreased toelrance to don/doff clothing and house chores/work tasks.   Functional limitations due to impairments dressing, cooking, driving.   Clinical Presentation Evolving/Changing   Clinical Decision Making (Complexity) Low complexity   Predicted Duration of Therapy Intervention (days/wks) patient requests 1x only   Risk & Benefits of therapy have been explained Yes   Patient, Family & other staff in agreement with plan of care  Yes   Clinical Impression Comments strain of cuff, likley cuff pathology prior to fall, decreased ROM/strengh   ORTHO GOALS   PT Ortho Eval Goals 1;2;3   Ortho Goal 1   Goal Identifier ROM   Goal Description PROM 130 and AROM 120 for improved house and work tasks.   Target Date 01/29/21   Ortho Goal 2   Goal Identifier strengh   Goal Description flexion strength to 4+ to allow reaching overhead for chores and work.   Target Date 01/29/21   Ortho Goal 3   Goal Identifier HEP   Goal Description patient will maintain HEP 2-3x/wk for improved ROM and strength   Target Date 01/29/21   Total Evaluation Time   PT Eval, Low Complexity Minutes (18824) 30   Therapy Certification   Certification date from 12/22/20   Certification date to 01/29/21   Medical Diagnosis Neck pain M54.2, Acute pain of left shoulder M25.512 , Left elbow pain M25.522

## 2020-12-22 NOTE — PROGRESS NOTES
[facility]          OUTPATIENT PHYSICAL THERAPY ORTHOPEDIC EVALUATION  PLAN OF TREATMENT FOR OUTPATIENT REHABILITATION  (COMPLETE FOR INITIAL CLAIMS ONLY)  Patient's Last Name, First Name, M.I.  YOB: 1945  Rebekah Shirley    Provider s Name:  [facility]   Medical Record No.  [id number]   Start of Care Date:  12/22/20   Onset Date:  12/20/20   Type:     _X__PT   ___OT   ___SLP Medical Diagnosis:  Neck pain M54.2, Acute pain of left shoulder M25.512 , Left elbow pain M25.522      PT Diagnosis:  cuff strain s/p fall, decreased ROM and strength   Visits from SOC:  1      _________________________________________________________________________________  Plan of Treatment/Functional Goals:  joint mobilization, manual therapy, neuromuscular re-education, ROM, strengthening, stretching     Cryotherapy, Hot packs, Ultrasound     Goals  Goal Identifier: ROM  Goal Description: PROM 130 and AROM 120 for improved house and work tasks.  Target Date: 01/29/21    Goal Identifier: strengh  Goal Description: flexion strength to 4+ to allow reaching overhead for chores and work.  Target Date: 01/29/21    Goal Identifier: HEP  Goal Description: patient will maintain HEP 2-3x/wk for improved ROM and strength  Target Date: 01/29/21                 Therapy Frequency:     Predicted Duration of Therapy Intervention:  patient requests 1x only    Valentin Figueredo, PT                 I CERTIFY THE NEED FOR THESE SERVICES FURNISHED UNDER        THIS PLAN OF TREATMENT AND WHILE UNDER MY CARE     (Physician co-signature of this document indicates review and certification of the therapy plan).                       Certification Date From:  12/22/20   Certification Date To:  01/29/21    Referring Provider:  ASCENCION Schumacher    Initial Assessment        See Epic Evaluation Start of Care Date: 12/22/20

## 2020-12-22 NOTE — PROGRESS NOTES
[facility] HEART [facility]   CARDIOLOGY PROGRESS NOTE     Chief Complaint   Patient presents with     Follow Up     annual          Diagnosis:  1.  Dyspnea on exertion of unknown etiology with work-up on 6/25/2019-resolved.  2.  Fatigue-unknown etiology-resolved.  3.  Noncardiac chest pain-resolved.  4.  Hyperlipidemia-controlled.  5.  S/P cardiac catheterization through [facility] in June, 2019 without stenting or severe disease, report pending.  6.  On statin therapy.  7.  History of tobacco abuse quitting 11/5/2008.  8.  RA.  9.  Elevated calcium score on 4/22/2019 at 606.7.  10.  Aortic atherosclerosis.  11.  GERD.    Assessment/Plan:    1.  Fatigue and chest pain have resolved.  2.  Refilled amlodipine 5 mg daily, aspirin 81 mg daily, sublingual nitro as needed for chest pain, and rosuvastatin 20 mg daily.  3.  Secondary to acid reflux, started on Protonix 40 mg daily.  4.  Follow-up in 1 year or sooner if with issues.    Interval history:  Rebekah is doing well.  She had previously been evaluated for shortness of breath and chest pain.  She states both of these have resolved.  She is doing well.  She has no significant cardiac complaints today.  She denies chest pain, chest tightness, or chest discomfort.  Cholesterol has been controlled.  She previous had a cardiac catheterization in June, 2019 without significant disease.  She previously smoked quitting in 2008.  She does state she has regular episodes of acid reflux.  She is not currently on anything for acid reflux.  Patient has no additional issues.      HPI:    She was seen by cardiology secondary to chest pressure.  She had a calcium score of 606.7 on 4/22/2019.  She has cardiac risk factors which include hyperlipidemia, history of tobacco abuse quitting on 11/5/2008, RA, dyspnea on exertion, and atherosclerosis of her ascending aorta.  Based on her history, symptoms, and testing it was suggest that she have an angiogram.  Originally, this was set at the  "[facility].  However, this was changed to [facility] secondary to transportation issues.  Her cath was completed in 6/2019.  I do not have her cardiac catheterization report but she reports not having any stents placed.  They told her \"to follow-up in 2 years\".  I am not sure what this means and we are working getting the report.     She had an ultrasound of her abdominal aorta on 5/22/2019 without evidence for aneurysm.  She also had ultrasound of her carotids on 5/22/2019 without significant carotid artery disease. She was scheduled for an echocardiogram on 5/9/2019 but was not completed.     She continues to complain of dyspnea on exertion and fatigue.  She is uncertain if she snores at night as she does not have a bed partner.  She was referred for sleep study on 6/25/2019.  We suggested doing routine labs and assessment for fatigue and shortness of breath which she was agreeable.  It was suggest that she complete her echocardiogram.  It was also suggested that she have a CT scan of her chest as she has a history of rheumatoid arthritis.     For approximately 1 to 2 years, she has been having inconsistent chest tightness.  She is somewhat vague in the details but gives examples of cleaning her house, vacuuming, and doing housework.  When she has her symptoms, she is uncertain if she will die.  She would like to be around her grandkids.  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  She likes to \"complete the task until is done\".  With these activities, she describes a severe chest tightness to her left precordium with radiation to her jaw. She has used nitro in the past with benefit.  She is unable to quantify the frequency of her symptoms.  With it, she describes herself as being fatigued, short of breath, mildly diaphoretic, and dizzy.  She denies nausea and vomiting.  She previously smoked quitting on 11/5/2008.  She describes herself as an " infrequent smoker with 1 pack lasting a week.  If she was social with drinking, she may go through 2 packs a week.  She was exposed to secondhand smoke in her home.  She also has a history of hyperlipidemia which had been uncontrolled, RA, obesity, sedentary lifestyle, and poor diet.     She had a stress echo on 2016 which was negative for evidence for ischemia.  She was able to go 8.3 minutes and was without symptoms.  The EKG portion was normal.      Relevant testing:  US carotid on 19:  No hemodynamically significant stenosis.    ECHO on 19:  Pending.    CT scan for calcium on 19:  Total calcium score of 606.7. The observed calcium score is at the  93rd percentile for subjects of the same age, gender, and  race/ethnicity who are free of clinical cardiovascular disease and  treated diabetes.        ICD-10-CM    1. Chest pain, unspecified type  R07.9 EKG 12-lead, tracing only       Past Medical History:   Diagnosis Date     Hyperlipidemia     No Comments Provided     Nicotine dependence, uncomplicated     No Comments Provided     Personal history of other medical treatment (CODE)      4, para 3.     Rheumatoid arthritis (H)        Past Surgical History:   Procedure Laterality Date     APPENDECTOMY OPEN      Incidental appendectomy at time of adhesion lysis.     COLONOSCOPY      06,Colonoscopy (screening), normal, Dr. Orellana     LAPAROSCOPIC TUBAL LIGATION           LAPAROTOMY EXPLORATORY      History of partial small bowel obstruction necessitating exploratory laparotomy.  Small area of adhesions with secondary lysis felt responsible for the partial obstruction.       No Known Allergies    Current Outpatient Medications   Medication Sig Dispense Refill     amLODIPine (NORVASC) 5 MG tablet Take 1 tablet (5 mg) by mouth daily 90 tablet 0     aspirin (ASA) 81 MG chewable tablet Take 1 tablet (81 mg) by mouth daily 90 tablet 3     folic acid (FOLVITE) 1 MG tablet Take 1 mg by  mouth daily       methotrexate 2.5 MG tablet CHEMO Take 25 mg by mouth every 7 days       nitroGLYcerin (NITROSTAT) 0.4 MG sublingual tablet Place 1 tablet (0.4 mg) under the tongue every 5 minutes as needed for chest pain 20 tablet 0     order for DME Equipment being ordered: ankle brace 1 each 0     predniSONE (DELTASONE) 5 MG tablet TAKE 1 TABLET EVERY DAY       rosuvastatin (CRESTOR) 20 MG tablet TAKE 1 TABLET EVERY DAY 90 tablet 0     sulfaSALAzine (AZULFIDINE) 500 MG tablet TAKE 2 TABLETS BY MOUTH EVERY MORNING AND 3 TABLETS EVERY EVENING         Social History     Socioeconomic History     Marital status: Single     Spouse name:      Number of children: Not on file     Years of education: Not on file     Highest education level: Not on file   Occupational History     Not on file   Social Needs     Financial resource strain: Not on file     Food insecurity     Worry: Not on file     Inability: Not on file     Transportation needs     Medical: Not on file     Non-medical: Not on file   Tobacco Use     Smoking status: Former Smoker     Packs/day: 0.10     Quit date: 2008     Years since quittin.1     Smokeless tobacco: Never Used   Substance and Sexual Activity     Alcohol use: No     Frequency: Never     Drug use: No     Sexual activity: Not Currently     Partners: Male   Lifestyle     Physical activity     Days per week: Not on file     Minutes per session: Not on file     Stress: Not on file   Relationships     Social connections     Talks on phone: Not on file     Gets together: Not on file     Attends Rastafari service: Not on file     Active member of club or organization: Not on file     Attends meetings of clubs or organizations: Not on file     Relationship status: Not on file     Intimate partner violence     Fear of current or ex partner: Not on file     Emotionally abused: Not on file     Physically abused: Not on file     Forced sexual activity: Not on file   Other Topics Concern  "    Parent/sibling w/ CABG, MI or angioplasty before 65F 55M? Not Asked   Social History Narrative    . Three sons. 4 grandchildren. Works for [employer] in the kitchen.       LAB RESULTS:   Office Visit on 12/15/2020   Component Date Value Ref Range Status     Sodium 12/15/2020 138  134 - 144 mmol/L Final     Potassium 12/15/2020 4.4  3.5 - 5.1 mmol/L Final     Chloride 12/15/2020 105  98 - 107 mmol/L Final     Carbon Dioxide 12/15/2020 25  21 - 31 mmol/L Final     Anion Gap 12/15/2020 8  3 - 14 mmol/L Final     Glucose 12/15/2020 139* 70 - 105 mg/dL Final     Urea Nitrogen 12/15/2020 16  7 - 25 mg/dL Final     Creatinine 12/15/2020 0.68  0.60 - 1.20 mg/dL Final     GFR Estimate 12/15/2020 85  >60 mL/min/[1.73_m2] Final     GFR Estimate If Black 12/15/2020 >90  >60 mL/min/[1.73_m2] Final     Calcium 12/15/2020 8.7  8.6 - 10.3 mg/dL Final     WBC 12/15/2020 8.1  4.0 - 11.0 10e9/L Final     RBC Count 12/15/2020 3.65* 3.8 - 5.2 10e12/L Final     Hemoglobin 12/15/2020 11.3* 11.7 - 15.7 g/dL Final     Hematocrit 12/15/2020 35.1  35.0 - 47.0 % Final     MCV 12/15/2020 96  78 - 100 fl Final     MCH 12/15/2020 31.0  26.5 - 33.0 pg Final     MCHC 12/15/2020 32.2  31.5 - 36.5 g/dL Final     RDW 12/15/2020 13.4  10.0 - 15.0 % Final     Platelet Count 12/15/2020 329  150 - 450 10e9/L Final   [facility]/Nurse Visit on 12/10/2020   Component Date Value Ref Range Status     COVID-19 Virus PCR to [facility] - So* 12/10/2020 Nasopharyngeal   Final     COVID-19 Virus PCR to [facility] - Re* 12/10/2020 Not Detected   Final        Review of systems: Negative except that which was noted in the HPI.    Physical examination:  /68 (BP Location: Right arm, Patient Position: Sitting, Cuff Size: Adult Regular)   Pulse 88   Temp 97  F (36.1  C) (Tympanic)   Resp 18   Ht 1.51 m (4' 11.45\")   Wt 72.1 kg (159 lb)   SpO2 98%   BMI 31.63 kg/m      GENERAL APPEARANCE: healthy, alert and no distress  HEENT: no icterus, no " xanthelasmas, normal pupil size and reaction, no cyanosis.  NECK: no adenopathy, no asymmetry, masses.  CHEST: lungs clear to auscultation - no rales, rhonchi or wheezes, no use of accessory muscles, no retractions, respirations are unlabored, normal respiratory rate  CARDIOVASCULAR: regular rhythm, normal S1 with physiologic split S2, no S3 or S4 and no murmur, click or rub  ABDOMEN: soft, non tender, bowel sounds normal  EXTREMITIES: no clubbing, cyanosis or edema  NEURO: alert and oriented normal speech, and affect  VASC: No vascular bruits heard.  SKIN: no ecchymoses, no rashes        Thank you for allowing me to participate in the care of your patient. Please do not hesitate to contact me if you have any questions.     Pelon Ray, DO

## 2020-12-23 RX ORDER — PANTOPRAZOLE SODIUM 20 MG/1
TABLET, DELAYED RELEASE ORAL
Qty: 180 TABLET | Refills: 3 | Status: SHIPPED | OUTPATIENT
Start: 2020-12-23

## 2020-12-23 NOTE — TELEPHONE ENCOUNTER
"Requested Prescriptions   Pending Prescriptions Disp Refills     pantoprazole (PROTONIX) 20 MG EC tablet [Pharmacy Med Name: PANTOPRAZOLE 20MG TABLETS] 180 tablet      Sig: TAKE 2 TABLETS(40 MG) BY MOUTH DAILY       PPI Protocol Passed - 12/22/2020  2:31 PM        Passed - Not on Clopidogrel (unless Pantoprazole ordered)        Passed - No diagnosis of osteoporosis on record        Passed - Recent (12 mo) or future (30 days) visit within the authorizing provider's specialty     Patient has had an office visit with the authorizing provider or a provider within the authorizing providers department within the previous 12 mos or has a future within next 30 days. See \"Patient Info\" tab in inbasket, or \"Choose Columns\" in Meds & Orders section of the refill encounter.              Passed - Medication is active on med list        Passed - Patient is age 18 or older        Passed - No active pregnacy on record        Passed - No positive pregnancy test in past 12 months         Patient requesting a 90 day supply will send this to [facility]. Prescription approved per [facility] Refill Protocol. Lor Ledbetter RN on 12/23/2020 at 9:10 AM        "

## 2020-12-24 LAB — INTERPRETATION ECG - MUSE: NORMAL

## 2020-12-27 ENCOUNTER — HEALTH MAINTENANCE LETTER (OUTPATIENT)
Age: 75
End: 2020-12-27

## 2021-01-12 ENCOUNTER — OFFICE VISIT (OUTPATIENT)
Dept: FAMILY MEDICINE | Facility: OTHER | Age: 76
End: 2021-01-12
Attending: FAMILY MEDICINE
Payer: COMMERCIAL

## 2021-01-12 VITALS
BODY MASS INDEX: 31.93 KG/M2 | OXYGEN SATURATION: 96 % | RESPIRATION RATE: 20 BRPM | WEIGHT: 158.4 LBS | HEIGHT: 59 IN | DIASTOLIC BLOOD PRESSURE: 70 MMHG | HEART RATE: 96 BPM | TEMPERATURE: 99.4 F | SYSTOLIC BLOOD PRESSURE: 114 MMHG

## 2021-01-12 DIAGNOSIS — M05.742 RHEUMATOID ARTHRITIS INVOLVING BOTH HANDS WITH POSITIVE RHEUMATOID FACTOR (H): ICD-10-CM

## 2021-01-12 DIAGNOSIS — M25.572 PAIN IN JOINT INVOLVING ANKLE AND FOOT, LEFT: Primary | ICD-10-CM

## 2021-01-12 DIAGNOSIS — M05.741 RHEUMATOID ARTHRITIS INVOLVING BOTH HANDS WITH POSITIVE RHEUMATOID FACTOR (H): ICD-10-CM

## 2021-01-12 PROBLEM — J44.9 COPD (CHRONIC OBSTRUCTIVE PULMONARY DISEASE) (H): Status: ACTIVE | Noted: 2021-01-12

## 2021-01-12 PROBLEM — I20.89 ANGINA OF EFFORT (H): Status: RESOLVED | Noted: 2020-12-22 | Resolved: 2021-01-12

## 2021-01-12 PROBLEM — R07.9 CHEST PAIN, UNSPECIFIED TYPE: Status: RESOLVED | Noted: 2020-12-22 | Resolved: 2021-01-12

## 2021-01-12 PROCEDURE — 99214 OFFICE O/P EST MOD 30 MIN: CPT | Performed by: FAMILY MEDICINE

## 2021-01-12 PROCEDURE — G0463 HOSPITAL OUTPT CLINIC VISIT: HCPCS

## 2021-01-12 RX ORDER — PREDNISONE 20 MG/1
40 TABLET ORAL DAILY
Qty: 10 TABLET | Refills: 0 | Status: SHIPPED | OUTPATIENT
Start: 2021-01-12 | End: 2021-01-17

## 2021-01-12 ASSESSMENT — PAIN SCALES - GENERAL: PAINLEVEL: EXTREME PAIN (8)

## 2021-01-12 ASSESSMENT — MIFFLIN-ST. JEOR: SCORE: 1136.27

## 2021-01-12 NOTE — PROGRESS NOTES
"SUBJECTIVE:  Rebekah Shirley is a 75 year old female here for widespread joint pain.  She has a history of rheumatoid arthritis and she continues on prednisone 5 mg daily, methotrexate 25 mg once a week and sulfasalazine.  She reports that she been on methotrexate and sulfasalazine for 2 weeks and has been on some sort of mixup with her medications and refills.  She reports wide spread pain in her shoulder girdle, shoulders, elbows, wrists and left foot.  She has had no new injuries.  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  She continues to use anti-inflammatories periodically at home.    ROS:    As above otherwise ROS is unremarkable.    OBJECTIVE:  /70   Pulse 96   Temp 99.4  F (37.4  C)   Resp 20   Ht 1.51 m (4' 11.45\")   Wt 71.8 kg (158 lb 6.4 oz)   SpO2 96%   BMI 31.51 kg/m      EXAM:  General Appearance: Pleasant, alert, appropriate appearance for age. No acute distress  Musculoskeletal: Widespread tenderness palpation but worsened in her trapezius, shoulder girdle, bilateral wrists and left anterior ankle joint as well as left first metatarsal.  Skin: No significant warmth or erythema.    ASSESSEMENT AND PLAN:    1. Pain in joint involving ankle and foot, left    2. Rheumatoid arthritis involving both hands with positive rheumatoid factor (H)      Reviewed x-ray from July for her left ankle which does not show any acute injury.  Also reviewed medications.  At this time given her widespread likely inflammatory arthritis type symptoms will give her a prednisone burst of 40 mg daily for 5 days.  She will follow-up next week where she will return back to 5 mg daily.  If she has 1 or 2 areas that continue to bother her we could potentially consider steroid injections in those areas.  We will also refer for x-ray of her left ankle as that needs to be done through radiology.  If she is having no significant provement would recommend that she follows up with her rheumatologist.  " She will also contact me if she continues to have difficulty getting methotrexate or sulfasalazine refilled.    Esa Wright MD    This document was prepared using voice generated software.  While every attempt was made for accuracy, grammatical errors may exist.

## 2021-01-12 NOTE — NURSING NOTE
Patient presents today for pain in her wrist, neck, ankles and knees. She would like to discuss possible cortisone injections. She previously had injections done back in January of 2019.    Medication Reconciliation Complete    Deloris Montoya LPN  1/12/2021 1:47 PM

## 2021-01-19 ENCOUNTER — OFFICE VISIT (OUTPATIENT)
Dept: FAMILY MEDICINE | Facility: OTHER | Age: 76
End: 2021-01-19
Attending: FAMILY MEDICINE
Payer: COMMERCIAL

## 2021-01-19 VITALS
HEART RATE: 92 BPM | HEIGHT: 59 IN | OXYGEN SATURATION: 96 % | TEMPERATURE: 99.8 F | WEIGHT: 156 LBS | SYSTOLIC BLOOD PRESSURE: 134 MMHG | DIASTOLIC BLOOD PRESSURE: 70 MMHG | BODY MASS INDEX: 31.45 KG/M2 | RESPIRATION RATE: 20 BRPM

## 2021-01-19 DIAGNOSIS — M05.741 RHEUMATOID ARTHRITIS INVOLVING BOTH HANDS WITH POSITIVE RHEUMATOID FACTOR (H): Primary | ICD-10-CM

## 2021-01-19 DIAGNOSIS — M05.742 RHEUMATOID ARTHRITIS INVOLVING BOTH HANDS WITH POSITIVE RHEUMATOID FACTOR (H): Primary | ICD-10-CM

## 2021-01-19 PROCEDURE — 99213 OFFICE O/P EST LOW 20 MIN: CPT | Performed by: FAMILY MEDICINE

## 2021-01-19 PROCEDURE — G0463 HOSPITAL OUTPT CLINIC VISIT: HCPCS

## 2021-01-19 RX ORDER — PREDNISONE 10 MG/1
10 TABLET ORAL DAILY
Qty: 30 TABLET | Refills: 11 | Status: SHIPPED | OUTPATIENT
Start: 2021-01-19 | End: 2021-04-08

## 2021-01-19 ASSESSMENT — PAIN SCALES - GENERAL: PAINLEVEL: MODERATE PAIN (5)

## 2021-01-19 ASSESSMENT — MIFFLIN-ST. JEOR: SCORE: 1125.38

## 2021-01-19 NOTE — PROGRESS NOTES
"SUBJECTIVE:  Rebekah Shirley is a 75 year old female here for follow-up.  She has a history of rheumatoid arthritis.  Her chronic regimen includes prednisone 5 mg daily, methotrexate 25 mg once a week and sulfasalazine.  She was seen on January 12 after she had been out of methotrexate and sulfasalazine for 2 weeks due to a mixup with her refills.  At that time we gave her a burst of prednisone 40 mg daily for 5 days.  She reports that all of her symptoms completely resolved.  Since she has been off prednisone she reports that her hands has started to bother her once more but she is overall feeling much improved compared to last week.  She now reports that her prednisone 5 mg daily dose has also had difficulty with refills so she has not taken anything since her 40 mg burst.    ROS:    As above otherwise ROS is unremarkable.    OBJECTIVE:  /70   Pulse 92   Temp 99.8  F (37.7  C)   Resp 20   Ht 1.51 m (4' 11.45\")   Wt 70.8 kg (156 lb)   SpO2 96%   BMI 31.03 kg/m      EXAM:  General Appearance: Pleasant, alert, appropriate appearance for age. No acute distress    ASSESSEMENT AND PLAN:    1. Rheumatoid arthritis involving both hands with positive rheumatoid factor (H)      We once again reviewed her rheumatology regimen.  She reports that she is back on methotrexate and sulfasalazine however I suspect will take some time for that to start working as she was off it for a couple of weeks.  She is currently off of prednisone and is requesting an increased dose of we will increase back to 10 mg daily.  Hopefully this to get her symptoms under better control and then at some point time we can decrease her down to 7.5 mg or 5 mg.  Continue to recommend follow-up with a rheumatologist for consideration of Biologics or other options that would allow her to decrease her prednisone use.    Esa Wright MD    This document was prepared using voice generated software.  While every attempt was made for accuracy, " grammatical errors may exist.

## 2021-01-19 NOTE — NURSING NOTE
Patient presents today for follow up on her pain.    Medication Reconciliation Complete    Deloris Montoya LPN  1/19/2021 4:07 PM

## 2021-01-29 ENCOUNTER — HOSPITAL ENCOUNTER (EMERGENCY)
Facility: OTHER | Age: 76
Discharge: HOME | End: 2021-01-29
Attending: EMERGENCY MEDICINE | Admitting: EMERGENCY MEDICINE
Payer: COMMERCIAL

## 2021-01-29 ENCOUNTER — APPOINTMENT (OUTPATIENT)
Dept: GENERAL RADIOLOGY | Facility: OTHER | Age: 76
End: 2021-01-29
Attending: EMERGENCY MEDICINE
Payer: COMMERCIAL

## 2021-01-29 VITALS
HEART RATE: 87 BPM | HEIGHT: 60 IN | TEMPERATURE: 98.1 F | WEIGHT: 155 LBS | BODY MASS INDEX: 30.43 KG/M2 | DIASTOLIC BLOOD PRESSURE: 67 MMHG | RESPIRATION RATE: 18 BRPM | SYSTOLIC BLOOD PRESSURE: 139 MMHG | OXYGEN SATURATION: 99 %

## 2021-01-29 DIAGNOSIS — S83.91XA SPRAIN OF RIGHT KNEE, UNSPECIFIED LIGAMENT, INITIAL ENCOUNTER: ICD-10-CM

## 2021-01-29 PROCEDURE — 99282 EMERGENCY DEPT VISIT SF MDM: CPT | Performed by: EMERGENCY MEDICINE

## 2021-01-29 PROCEDURE — 73562 X-RAY EXAM OF KNEE 3: CPT | Mod: RT

## 2021-01-29 PROCEDURE — 99283 EMERGENCY DEPT VISIT LOW MDM: CPT | Performed by: EMERGENCY MEDICINE

## 2021-01-29 ASSESSMENT — ENCOUNTER SYMPTOMS
VOMITING: 0
DYSURIA: 0
FEVER: 0
NAUSEA: 0
CHEST TIGHTNESS: 0
CHILLS: 0
AGITATION: 0
LIGHT-HEADEDNESS: 0
ARTHRALGIAS: 1
SHORTNESS OF BREATH: 0

## 2021-01-29 ASSESSMENT — MIFFLIN-ST. JEOR: SCORE: 1129.58

## 2021-01-29 NOTE — ED PROVIDER NOTES
History     Chief Complaint   Patient presents with     Knee Pain     twisted right knee     HPI  Rebekah Shirley is a 75 year old female who is here with knee pain.  She was at work and is pushing a cart into its place when she had a sudden onset pain in her right knee.  She did not fall or twisted unusually that she is aware of.  It is quite painful.  She has a hard time bearing any weight.  When I asked her where the pain is she points to the medial aspect of the knee.    Allergies:  No Known Allergies    Problem List:    Patient Active Problem List    Diagnosis Date Noted     COPD (chronic obstructive pulmonary disease) (H) 01/12/2021     Priority: Medium     Gastroesophageal reflux disease with esophagitis without hemorrhage 12/22/2020     Priority: Medium     Coronary artery calcification seen on CAT scan 12/22/2020     Priority: Medium     Non-occlusive coronary artery disease based on a cardiac catheterization through [facility] in June, 2019 06/25/2019     Priority: Medium     Hx of cardiac catheterization June, 2019 through [facility] without stenting 06/25/2019     Priority: Medium     Aortic atherosclerosis (H) 05/09/2019     Priority: Medium     Elevated coronary artery calcium score on 4/22/2019 at 606.7  05/08/2019     Priority: Medium     History of tobacco abuse  05/08/2019     Priority: Medium     Mixed hyperlipidemia 01/23/2018     Priority: Medium     Rheumatoid arthritis (H) 08/20/2013     Priority: Medium     Anxiety state 05/06/2013     Priority: Medium        Past Medical History:    Past Medical History:   Diagnosis Date     Hyperlipidemia      Nicotine dependence, uncomplicated      Personal history of other medical treatment (CODE)      Rheumatoid arthritis (H)        Past Surgical History:    Past Surgical History:   Procedure Laterality Date     APPENDECTOMY OPEN  1993    Incidental appendectomy at time of adhesion lysis.     COLONOSCOPY      7/19/06,Colonoscopy (screening), normal,   [facility]     LAPAROSCOPIC TUBAL LIGATION      1982     LAPAROTOMY EXPLORATORY      History of partial small bowel obstruction necessitating exploratory laparotomy.  Small area of adhesions with secondary lysis felt responsible for the partial obstruction.       Family History:    Family History   Problem Relation Age of Onset     Heart Disease Father 58        MI     Arthritis Mother      Hyperlipidemia Mother      Other - See Comments Sister         Occupational lung disease     Other - See Comments Brother         Laryngeal cancer     Heart Disease Sister         Surgery for heart       Social History:  Marital Status:  Single [1]  Social History     Tobacco Use     Smoking status: Former Smoker     Packs/day: 0.10     Quit date: 2008     Years since quittin.2     Smokeless tobacco: Never Used   Substance Use Topics     Alcohol use: No     Frequency: Never     Drug use: No        Medications:         amLODIPine (NORVASC) 5 MG tablet       aspirin (ASA) 81 MG chewable tablet       folic acid (FOLVITE) 1 MG tablet       methotrexate 2.5 MG tablet CHEMO       pantoprazole (PROTONIX) 20 MG EC tablet       predniSONE (DELTASONE) 10 MG tablet       predniSONE (DELTASONE) 5 MG tablet       rosuvastatin (CRESTOR) 20 MG tablet       sulfaSALAzine (AZULFIDINE) 500 MG tablet       nitroGLYcerin (NITROSTAT) 0.4 MG sublingual tablet       order for DME          Review of Systems   Constitutional: Negative for chills and fever.   HENT: Negative for congestion.    Eyes: Negative for visual disturbance.   Respiratory: Negative for chest tightness and shortness of breath.    Cardiovascular: Negative for chest pain.   Gastrointestinal: Negative for nausea and vomiting.   Genitourinary: Negative for dysuria.   Musculoskeletal: Positive for arthralgias.   Skin: Negative for rash.   Neurological: Negative for light-headedness.   Psychiatric/Behavioral: Negative for agitation.       Physical Exam   BP: 139/67  Pulse: 87  Temp:  98.1  F (36.7  C)  Resp: 18  Height: 152.4 cm (5')  Weight: 70.3 kg (155 lb)  SpO2: 99 %      Physical Exam  Vitals signs and nursing note reviewed.   Constitutional:       Appearance: Normal appearance.   HENT:      Head: Normocephalic and atraumatic.      Mouth/Throat:      Mouth: Mucous membranes are moist.   Eyes:      Conjunctiva/sclera: Conjunctivae normal.   Cardiovascular:      Rate and Rhythm: Normal rate.   Pulmonary:      Effort: Pulmonary effort is normal.   Musculoskeletal:      Comments: No obvious swelling or effusion or erythema.  Little bit tender over the medial joint space.  No pain or joint laxity with varus or valgus stressing.  Negative anterior drawer test.  She was sitting in a wheelchair during exam and I straighten her leg out for the collateral ligament testing.  When I let her bring her leg back to a more flexed position is when she seem to have the most pain.   Skin:     General: Skin is warm and dry.   Neurological:      Mental Status: She is alert and oriented to person, place, and time.   Psychiatric:         Mood and Affect: Mood normal.         Behavior: Behavior normal.         ED Course        Procedures          \       Results for orders placed or performed during the hospital encounter of 01/29/21 (from the past 24 hour(s))   XR Knee Right 3 Views    Narrative    PROCEDURE:  XR KNEE RT 3 VW    HISTORY: medial pain.    COMPARISON:  7/21/2020    TECHNIQUE:  3 views right knee.    FINDINGS:  No fracture or dislocation is identified. Mild  tricompartmental osteoarthritis is most pronounced in the  patellofemoral compartment. No foreign body is seen.       Impression    IMPRESSION: Mild tricompartmental osteoarthritis.      LOBO DODGE MD       Medications - No data to display    Assessments & Plan (with Medical Decision Making)     I have reviewed the nursing notes.    I have reviewed the findings, diagnosis, plan and need for follow up with the patient.  X-ray reassuring,  no bony injury.  There is some osteoarthritis there.  Based on exam I do not believe she has any significant ligamentous injury, however I am suspicious for possible meniscal injury.  At this point she does not wish anything for pain.  She has a walker she can use at home.  I will give her a knee immobilizer to use while she is up and about.  If she is not improving in the next 1 to 2 weeks she should follow-up in clinic for a recheck.    New Prescriptions    No medications on file       Final diagnoses:   Sprain of right knee, unspecified ligament, initial encounter       1/29/2021   [facility] AND [facility]     Sterling Angeles MD  01/29/21 [id number]

## 2021-01-29 NOTE — ED TRIAGE NOTES
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 Pt has no hx of breaking bones before. Has hx of arthritis.

## 2021-02-03 ENCOUNTER — OFFICE VISIT (OUTPATIENT)
Dept: FAMILY MEDICINE | Facility: OTHER | Age: 76
End: 2021-02-03
Attending: FAMILY MEDICINE
Payer: COMMERCIAL

## 2021-02-03 VITALS
SYSTOLIC BLOOD PRESSURE: 114 MMHG | DIASTOLIC BLOOD PRESSURE: 62 MMHG | RESPIRATION RATE: 20 BRPM | TEMPERATURE: 98.3 F | HEART RATE: 81 BPM | OXYGEN SATURATION: 98 % | BODY MASS INDEX: 30.74 KG/M2 | WEIGHT: 157.4 LBS

## 2021-02-03 DIAGNOSIS — J41.0 SIMPLE CHRONIC BRONCHITIS (H): ICD-10-CM

## 2021-02-03 DIAGNOSIS — S83.221D PERIPHERAL TEAR OF MEDIAL MENISCUS OF RIGHT KNEE AS CURRENT INJURY, SUBSEQUENT ENCOUNTER: ICD-10-CM

## 2021-02-03 PROBLEM — S83.221A PERIPHERAL TEAR OF MEDIAL MENISCUS OF RIGHT KNEE AS CURRENT INJURY: Status: ACTIVE | Noted: 2021-02-03

## 2021-02-03 PROCEDURE — 99213 OFFICE O/P EST LOW 20 MIN: CPT | Performed by: FAMILY MEDICINE

## 2021-02-03 PROCEDURE — G0463 HOSPITAL OUTPT CLINIC VISIT: HCPCS | Performed by: FAMILY MEDICINE

## 2021-02-03 ASSESSMENT — PAIN SCALES - GENERAL: PAINLEVEL: NO PAIN (0)

## 2021-02-03 NOTE — LETTER
[facility]  [address]  [place] [address]  Phone: [phone]  Fax: [phone]    February 3, 2021        Rebekah Shirley  [address]  [facility] [address]          To whom it may concern:    RE: Rebekah Shirley    Patient was seen and treated today at our clinic.  Patient could possible  return to work in one to two weeks.    Please contact me for questions or concerns.      Sincerely,        Vincent Fuller MD

## 2021-02-03 NOTE — PROGRESS NOTES
SUBJECTIVE:   Rebekah Shirley is a 75 year old female who presents to clinic today for the following health issues: Follow-up right knee from ER    Patient arrives here for follow-up right knee from ER.  She was working.  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  She twisted and felt a sudden onset of pain to her medial aspect of her right knee.  There was initially some mild swelling of.  She reports improvement.  And pain only occurs when she twists or turns her leg.  Injury occurred on the         Patient Active Problem List    Diagnosis Date Noted     COPD (chronic obstructive pulmonary disease) (H) 2021     Priority: Medium     Gastroesophageal reflux disease with esophagitis without hemorrhage 2020     Priority: Medium     Coronary artery calcification seen on CAT scan 2020     Priority: Medium     Non-occlusive coronary artery disease based on a cardiac catheterization through [facility] in 2019     Priority: Medium     Hx of cardiac catheterization  through [facility] without stenting 2019     Priority: Medium     Aortic atherosclerosis (H) 2019     Priority: Medium     Elevated coronary artery calcium score on 2019 at 606.7  2019     Priority: Medium     History of tobacco abuse  2019     Priority: Medium     Mixed hyperlipidemia 2018     Priority: Medium     Rheumatoid arthritis (H) 2013     Priority: Medium     Anxiety state 2013     Priority: Medium     Past Medical History:   Diagnosis Date     Hyperlipidemia     No Comments Provided     Nicotine dependence, uncomplicated     No Comments Provided     Personal history of other medical treatment (CODE)      4, para 3.     Rheumatoid arthritis (H)       Past Surgical History:   Procedure Laterality Date     APPENDECTOMY OPEN      Incidental appendectomy at time of adhesion lysis.     COLONOSCOPY       7/19/06,Colonoscopy (screening), normal, Dr. Orellana     LAPAROSCOPIC TUBAL LIGATION      1982     LAPAROTOMY EXPLORATORY      History of partial small bowel obstruction necessitating exploratory laparotomy.  Small area of adhesions with secondary lysis felt responsible for the partial obstruction.       Review of Systems     OBJECTIVE:     /62   Pulse 81   Temp 98.3  F (36.8  C)   Resp 20   Wt 71.4 kg (157 lb 6.4 oz)   SpO2 98%   BMI 30.74 kg/m    Body mass index is 30.74 kg/m .  Physical Exam  Constitutional:       Appearance: Normal appearance.   Musculoskeletal:      Comments: There is pain along the medial joint line right knee with palpation.  Possibly a small joint effusion ligaments are intact.   Neurological:      Mental Status: She is alert.         Diagnostic Test Results:  none     ASSESSMENT/PLAN:         1. Simple chronic bronchitis (H)  Epic required update stable    2. Peripheral tear of medial meniscus of right knee as current injury, subsequent encounter  This is either a meniscal tear or ligament strain.  Advised patient I could not be specific to.  If she continues to improve likely ligament.  If no improvement in the 1 to 2 weeks would consider meniscal tear.  Follow-up in clinic then.  For discussion of MRI and possible potential arthroscopy.  Work slip given        Vincent Fuller MD  [facility]

## 2021-02-03 NOTE — NURSING NOTE
Patent here for work comp follow up for right knee after being seen in the ED on 01/29/2021. Medication Reconciliation: complete.    Sharon Perera LPN  2/3/2021 11:30 AM

## 2021-02-08 ENCOUNTER — TELEPHONE (OUTPATIENT)
Dept: FAMILY MEDICINE | Facility: OTHER | Age: 76
End: 2021-02-08

## 2021-02-08 NOTE — TELEPHONE ENCOUNTER
Patient had a visit on 02/03/21 . She is asking when she can go back to work . Please advise. Chanelle Bob LPN ....................2/8/2021  10:34 AM

## 2021-02-08 NOTE — TELEPHONE ENCOUNTER
Called patient and gave her the information. She asked if she could have that in letter form to give to her supervisor. Could send through my chart. Chanelle Bob LPN ....................2/8/2021  11:43 AM

## 2021-02-08 NOTE — LETTER
[facility]  [address]  [place] [address]  Phone: [phone]  Fax: [phone]    February 8, 2021        Rebekah Shirley  [address]  [facility] [address]          To whom it may concern:    RE: Rebekah Shirley    Patient may return to work     Please contact me for questions or concerns.      Sincerely,        Vincent Fuller MD

## 2021-02-08 NOTE — TELEPHONE ENCOUNTER
I think as soon as she feels she has improved enough to be able to do the routine job duties that she is assigned to

## 2021-02-09 NOTE — TELEPHONE ENCOUNTER
Sent letter through [employer] as patient requested. Sharon Perera LPN .......................2/9/2021  8:53 AM

## 2021-02-18 ENCOUNTER — ALLIED HEALTH/NURSE VISIT (OUTPATIENT)
Dept: FAMILY MEDICINE | Facility: OTHER | Age: 76
End: 2021-02-18
Attending: FAMILY MEDICINE
Payer: COMMERCIAL

## 2021-02-18 DIAGNOSIS — J02.9 SORE THROAT: Primary | ICD-10-CM

## 2021-02-18 LAB
SARS-COV-2 RNA RESP QL NAA+PROBE: NORMAL
SPECIMEN SOURCE: NORMAL

## 2021-02-18 PROCEDURE — U0005 INFEC AGEN DETEC AMPLI PROBE: HCPCS | Mod: ZL | Performed by: FAMILY MEDICINE

## 2021-02-18 PROCEDURE — C9803 HOPD COVID-19 SPEC COLLECT: HCPCS

## 2021-02-18 PROCEDURE — U0003 INFECTIOUS AGENT DETECTION BY NUCLEIC ACID (DNA OR RNA); SEVERE ACUTE RESPIRATORY SYNDROME CORONAVIRUS 2 (SARS-COV-2) (CORONAVIRUS DISEASE [COVID-19]), AMPLIFIED PROBE TECHNIQUE, MAKING USE OF HIGH THROUGHPUT TECHNOLOGIES AS DESCRIBED BY CMS-2020-01-R: HCPCS | Mod: ZL | Performed by: FAMILY MEDICINE

## 2021-02-19 ENCOUNTER — MEDICAL CORRESPONDENCE (OUTPATIENT)
Dept: HEALTH INFORMATION MANAGEMENT | Facility: OTHER | Age: 76
End: 2021-02-19

## 2021-02-19 LAB
LABORATORY COMMENT REPORT: NORMAL
SARS-COV-2 RNA RESP QL NAA+PROBE: NEGATIVE
SPECIMEN SOURCE: NORMAL

## 2021-02-23 ENCOUNTER — EXTERNAL ORDER RESULTS (OUTPATIENT)
Dept: INFUSION THERAPY | Facility: OTHER | Age: 76
End: 2021-02-23

## 2021-02-23 DIAGNOSIS — M05.79 RHEUMATOID ARTHRITIS INVOLVING MULTIPLE SITES WITH POSITIVE RHEUMATOID FACTOR (H): ICD-10-CM

## 2021-03-15 ENCOUNTER — TELEPHONE (OUTPATIENT)
Dept: FAMILY MEDICINE | Facility: OTHER | Age: 76
End: 2021-03-15

## 2021-03-15 NOTE — TELEPHONE ENCOUNTER
Please call the patient regarding her 2nd covid shot.   She had the first one in January 2021 at another location.  Then she was quarantined in February.  Now she needs the second one and the place she received the first shot cant give her a second one because they are not doing Pfizer vaccines anymore.  Can we give he rhe second shot, or what should she do?      Cheri Moreno on 3/15/2021 at 3:27 PM

## 2021-03-15 NOTE — TELEPHONE ENCOUNTER
Transferred to scheduling and she will bring her Covid card with her to her visit.  Norma J. Gosselin, LPN .......  3/15/2021  3:34 PM

## 2021-03-17 DIAGNOSIS — Z23 NEED FOR VACCINATION: Primary | ICD-10-CM

## 2021-03-17 NOTE — ED NOTES
Patient shown observation video and given handout   Postpartum Note    Keke Granger is a 32 year old  on post partum day 2.  Induction of labor for preeclampsia.     She is doing well , breast feeding , pain well controlled.  Patient denies headaches, blurred vision..        Physical Exam:    Looks well.  Vitals:    21 2000 21 0400 21 0800 21 0915   BP: 125/81 121/79 129/85    BP Location: LUE - Left upper extremity LUE - Left upper extremity RUE - Right upper extremity    Patient Position: Sitting Supine Semi-Scott's    Pulse: 90 86 89 88   Resp: 18 18 16    Temp: 97.7 °F (36.5 °C) 98.4 °F (36.9 °C) 98.1 °F (36.7 °C)    TempSrc: Oral Oral Oral    SpO2: 99% 97% 99%    Weight:       Height:       LMP: 2020      Heart: RRR   Lungs: CTAB  Uterus:  Fundus firm at firm  Extremities:  nontender    Labs:    HGB (g/dL)   Date Value   2021 11.5 (L)       HCT (%)   Date Value   2021 33.4 (L)       Assessment/Plan:  Postpartum day 2: Induction of labor for  preeclampsia  1. Patient is doing well, okay to discharge home  2. Blood pressures well controlled on labetalol 200 mg every 12 hours.  Patient was advised to monitor blood pressures at home, guidelines given as to when to call.  Patient to follow-up in office in 3-4 days for blood pressure check.  Warning signs discussed.  3. Discharge instruction given: Call with fever/chills, increased bleeding, foul-smelling discharge, breast pain, headaches, vision changes.     Liliana Cheatham MD  Date:  3/17/2021      Time:  10:51 AM

## 2021-04-08 ENCOUNTER — OFFICE VISIT (OUTPATIENT)
Dept: FAMILY MEDICINE | Facility: OTHER | Age: 76
End: 2021-04-08
Attending: FAMILY MEDICINE
Payer: COMMERCIAL

## 2021-04-08 ENCOUNTER — HOSPITAL ENCOUNTER (OUTPATIENT)
Dept: GENERAL RADIOLOGY | Facility: OTHER | Age: 76
End: 2021-04-08
Attending: FAMILY MEDICINE
Payer: COMMERCIAL

## 2021-04-08 VITALS
RESPIRATION RATE: 20 BRPM | WEIGHT: 160.2 LBS | HEART RATE: 94 BPM | DIASTOLIC BLOOD PRESSURE: 48 MMHG | BODY MASS INDEX: 31.29 KG/M2 | SYSTOLIC BLOOD PRESSURE: 104 MMHG | TEMPERATURE: 98.5 F | OXYGEN SATURATION: 94 %

## 2021-04-08 DIAGNOSIS — D17.30 LIPOMA OF SKIN AND SUBCUTANEOUS TISSUE: Primary | ICD-10-CM

## 2021-04-08 DIAGNOSIS — R06.02 SOB (SHORTNESS OF BREATH): ICD-10-CM

## 2021-04-08 DIAGNOSIS — M25.572 PAIN IN JOINT, ANKLE AND FOOT, LEFT: ICD-10-CM

## 2021-04-08 PROCEDURE — 71046 X-RAY EXAM CHEST 2 VIEWS: CPT

## 2021-04-08 PROCEDURE — G0463 HOSPITAL OUTPT CLINIC VISIT: HCPCS | Mod: 25 | Performed by: FAMILY MEDICINE

## 2021-04-08 PROCEDURE — 99214 OFFICE O/P EST MOD 30 MIN: CPT | Performed by: FAMILY MEDICINE

## 2021-04-08 ASSESSMENT — PAIN SCALES - GENERAL: PAINLEVEL: WORST PAIN (10)

## 2021-04-08 NOTE — NURSING NOTE
Patient  Here for lump on the left shoulder for the past 3 weeks, pain in hands 10/10 pain in left ankle 03/10. Medication Reconciliation: complete.    Sharon Perera LPN  4/8/2021 2:02 PM

## 2021-04-10 NOTE — PROGRESS NOTES
SUBJECTIVE:   Rebekah Shirley is a 75 year old female who presents to clinic today for the following health issues: Right shoulder lump.  Left ankle pain.    Patient arrives here for right shoulder lump.  States is been there and bothering her for about 3 weeks.  She denies any trauma.  She also is been having left ankle pain.  She does have a brace but has gotten older.  And nonfunctional.  There is no history of trauma to her left ankle.  During the interview she is also reporting increasing shortness of breath.  She reports no chest pain with the shortness of breath.  Review of the chart shows that she has had a recent completely work-up with angiograms.  No stenting was required.  Echocardiogram.  And cardiology visit.        Patient Active Problem List    Diagnosis Date Noted     Rheumatoid arthritis involving multiple sites with positive rheumatoid factor (H) 02/23/2021     Priority: Medium     Per [facility]       Peripheral tear of medial meniscus of right knee as current injury 02/03/2021     Priority: Medium     COPD (chronic obstructive pulmonary disease) (H) 01/12/2021     Priority: Medium     Gastroesophageal reflux disease with esophagitis without hemorrhage 12/22/2020     Priority: Medium     Coronary artery calcification seen on CAT scan 12/22/2020     Priority: Medium     Non-occlusive coronary artery disease based on a cardiac catheterization through [facility] in June, 2019 06/25/2019     Priority: Medium     Hx of cardiac catheterization June, 2019 through [facility] without stenting 06/25/2019     Priority: Medium     Aortic atherosclerosis (H) 05/09/2019     Priority: Medium     Elevated coronary artery calcium score on 4/22/2019 at 606.7  05/08/2019     Priority: Medium     History of tobacco abuse  05/08/2019     Priority: Medium     Mixed hyperlipidemia 01/23/2018     Priority: Medium     Rheumatoid arthritis (H) 08/20/2013     Priority: Medium     Anxiety state 05/06/2013      Priority: Medium     Past Medical History:   Diagnosis Date     Hyperlipidemia     No Comments Provided     Nicotine dependence, uncomplicated     No Comments Provided     Personal history of other medical treatment (CODE)      4, para 3.     Rheumatoid arthritis (H)       Current Outpatient Medications   Medication Sig Dispense Refill     amLODIPine (NORVASC) 5 MG tablet Take 1 tablet (5 mg) by mouth daily 90 tablet 3     aspirin (ASA) 81 MG chewable tablet Take 1 tablet (81 mg) by mouth daily 90 tablet 3     folic acid (FOLVITE) 1 MG tablet Take 1 mg by mouth daily       methotrexate 2.5 MG tablet CHEMO Take 25 mg by mouth every 7 days       nitroGLYcerin (NITROSTAT) 0.4 MG sublingual tablet Place 1 tablet (0.4 mg) under the tongue every 5 minutes as needed for chest pain 25 tablet 3     pantoprazole (PROTONIX) 20 MG EC tablet TAKE 2 TABLETS(40 MG) BY MOUTH DAILY 180 tablet 3     predniSONE (DELTASONE) 5 MG tablet TAKE 1 TABLET EVERY DAY       rosuvastatin (CRESTOR) 20 MG tablet TAKE 1 TABLET EVERY DAY 90 tablet 3     sulfaSALAzine (AZULFIDINE) 500 MG tablet TAKE 2 TABLETS BY MOUTH EVERY MORNING AND 3 TABLETS EVERY EVENING       No Known Allergies    Review of Systems     OBJECTIVE:     /48   Pulse 94   Temp 98.5  F (36.9  C)   Resp 20   Wt 72.7 kg (160 lb 3.2 oz)   SpO2 94%   BMI 31.29 kg/m    Body mass index is 31.29 kg/m .  Physical Exam  Constitutional:       Appearance: Normal appearance.   HENT:      Head: Normocephalic.      Mouth/Throat:      Mouth: Mucous membranes are moist.   Cardiovascular:      Rate and Rhythm: Normal rate and regular rhythm.   Pulmonary:      Effort: Pulmonary effort is normal.      Breath sounds: Normal breath sounds.   Musculoskeletal:         General: Tenderness present. No swelling.      Comments: She does have a easily soft movable lump on the right shoulder consistent either with a ganglion or lipoma   Neurological:      Mental Status: She is alert.    Psychiatric:         Mood and Affect: Mood normal.         Diagnostic Test Results:  Results for orders placed or performed during the hospital encounter of 04/08/21   XR Chest 2 Views     Status: None    Narrative    PROCEDURE: XR CHEST 2 VW 4/8/2021 2:45 PM    HISTORY: SOB (shortness of breath)    COMPARISONS: 3/16/2019.    TECHNIQUE: 2 views.    FINDINGS: Heart and pulmonary vasculature are normal. No acute  infiltrate or effusion is seen.    There is multilevel degenerative change with accentuation of the  thoracic kyphosis. There are old right posterolateral rib fractures.         Impression    IMPRESSION: No acute infiltrate.    ADITHYA MARES MD       ASSESSMENT/PLAN:         1. Lipoma of skin and subcutaneous tissue  Discussed referral to orthopedic surgery.  Patient would like to wait    2. SOB (shortness of breath)  *Chest x-ray unremarkable.  Chart reviewed extensively.  She had a fairly complete work-up.  Monitor.  - XR Chest 2 Views; Future    3. Pain in joint, ankle and foot, left  Prescribed  - ELASTIC ANKLE SPLINT      Vincent Fuller MD  [facility]

## 2021-08-06 ENCOUNTER — OFFICE VISIT (OUTPATIENT)
Dept: FAMILY MEDICINE | Facility: OTHER | Age: 76
End: 2021-08-06
Attending: FAMILY MEDICINE
Payer: COMMERCIAL

## 2021-08-06 VITALS
RESPIRATION RATE: 20 BRPM | BODY MASS INDEX: 29.61 KG/M2 | HEIGHT: 60 IN | SYSTOLIC BLOOD PRESSURE: 116 MMHG | DIASTOLIC BLOOD PRESSURE: 68 MMHG | HEART RATE: 84 BPM | TEMPERATURE: 98.3 F | OXYGEN SATURATION: 96 % | WEIGHT: 150.8 LBS

## 2021-08-06 DIAGNOSIS — M05.742 RHEUMATOID ARTHRITIS INVOLVING BOTH HANDS WITH POSITIVE RHEUMATOID FACTOR (H): Primary | ICD-10-CM

## 2021-08-06 DIAGNOSIS — M05.741 RHEUMATOID ARTHRITIS INVOLVING BOTH HANDS WITH POSITIVE RHEUMATOID FACTOR (H): Primary | ICD-10-CM

## 2021-08-06 PROCEDURE — 20605 DRAIN/INJ JOINT/BURSA W/O US: CPT | Mod: 50 | Performed by: FAMILY MEDICINE

## 2021-08-06 PROCEDURE — 250N000011 HC RX IP 250 OP 636: Performed by: FAMILY MEDICINE

## 2021-08-06 PROCEDURE — G0463 HOSPITAL OUTPT CLINIC VISIT: HCPCS

## 2021-08-06 RX ORDER — TRIAMCINOLONE ACETONIDE 40 MG/ML
40 INJECTION, SUSPENSION INTRA-ARTICULAR; INTRAMUSCULAR ONCE
Status: DISCONTINUED | OUTPATIENT
Start: 2021-08-06 | End: 2021-08-06

## 2021-08-06 RX ADMIN — TRIAMCINOLONE ACETONIDE 10 MG: 10 INJECTION, SUSPENSION INTRA-ARTICULAR; INTRALESIONAL at 13:35

## 2021-08-06 RX ADMIN — TRIAMCINOLONE ACETONIDE 10 MG: 10 INJECTION, SUSPENSION INTRA-ARTICULAR; INTRALESIONAL at 13:34

## 2021-08-06 ASSESSMENT — PAIN SCALES - GENERAL: PAINLEVEL: WORST PAIN (10)

## 2021-08-06 ASSESSMENT — MIFFLIN-ST. JEOR: SCORE: 1105.52

## 2021-08-06 NOTE — PROGRESS NOTES
SUBJECTIVE:  Rebekah Shirley is a 76 year old female here for bilateral wrist pain.  She has a history of rheumatoid arthritis that has been recently affecting her neck, wrist and hands.  Her pain is worse in the morning and slowly gets better throughout the day.  She has quite a bit of swelling in her wrist as well as pain.  She continues on sulfasalazine and methotrexate.  She also is on prednisone 10 mg daily.  She is working on getting back into rheumatology to discuss other options.  She is here today requesting repeat wrist injections which have been helpful for her in the past.    ROS:    As above otherwise ROS is unremarkable.    OBJECTIVE:  /68   Pulse 84   Temp 98.3  F (36.8  C)   Resp 20   Ht 1.524 m (5')   Wt 68.4 kg (150 lb 12.8 oz)   SpO2 96%   BMI 29.45 kg/m      EXAM:  General Appearance: Pleasant, alert, appropriate appearance for age. No acute distress  Musculoskeletal: Both wrist show reduced range of motion in all planes.  She has mild swelling, no erythema or warmth.  She is diffusely tender.  She also has rheumatologic changes noted on her fingers.    ASSESSEMENT AND PLAN:    1. Rheumatoid arthritis involving both hands with positive rheumatoid factor (H)      Discussed options and she is requesting repeat steroid injections.  Verbal informed consent was obtained.  Her right dorsal wrist was cleansed normal fashion.  A 5 mL mixture of 10 mg of Kenalog and lidocaine recent filtrate her radiocarpal joint.    The procedure was then repeated on her left side as above.  She tolerated both injections and had no immediate complications.  She will ice both areas prevent postinjection flare and follow-up as needed.    Recommend continued follow-up with rheumatology.    Esa Wright MD  Family Medicine

## 2021-08-06 NOTE — NURSING NOTE
Patient presents today for bilateral wrist and cortisone injection.    Medication Reconciliation Complete    Deloris Montoya LPN  8/6/2021 1:14 PM

## 2021-10-09 ENCOUNTER — HEALTH MAINTENANCE LETTER (OUTPATIENT)
Age: 76
End: 2021-10-09

## 2021-11-03 NOTE — NURSING NOTE
[facility]  [address] [address]  Phone: [phone]  Fax: [phone]    Riya Skinner MD         November 3, 2021     Patient: Vianney Bae   YOB: 1937   Date of Visit: 11/3/2021       To Whom It May Concern: It is my medical opinion that Rudy Bradshaw requires a disability parking placard for the following reasons:  Osteoarthritis  Duration of need: 5 years  If you have any questions or concerns, please don't hesitate to call.     Sincerely,        Riya Skinner MD Patient presents in the clinic with concerns of vomiting, blurred vision, dizziness, and fatigue. Patient states these symptoms began after she took the Oxycodone yesterday morning.  Nicole Rolon LPN 5/24/2018 12:13 PM

## 2021-11-30 ENCOUNTER — PATIENT OUTREACH (OUTPATIENT)
Dept: FAMILY MEDICINE | Facility: OTHER | Age: 76
End: 2021-11-30
Payer: COMMERCIAL

## 2021-11-30 NOTE — TELEPHONE ENCOUNTER
Patient Quality Outreach      Summary:    Patient has the following on her problem list/HM: None    Patient is due/failing the following:   Breast Cancer Screening - Mammogram    Type of outreach:    Sent [place] message.    Questions for provider review:    None                                                                                                                                     Almaz Cisneros LPN .......11/30/2021 9:26 AM      Chart routed to .

## 2021-12-11 DIAGNOSIS — I25.10 CORONARY ARTERY CALCIFICATION SEEN ON CAT SCAN: ICD-10-CM

## 2021-12-11 DIAGNOSIS — I70.0 AORTIC ATHEROSCLEROSIS (H): ICD-10-CM

## 2021-12-11 DIAGNOSIS — E78.2 MIXED HYPERLIPIDEMIA: Primary | ICD-10-CM

## 2021-12-11 DIAGNOSIS — R93.1 ELEVATED CORONARY ARTERY CALCIUM SCORE: ICD-10-CM

## 2021-12-11 DIAGNOSIS — I25.10 NON-OCCLUSIVE CORONARY ARTERY DISEASE: ICD-10-CM

## 2021-12-13 RX ORDER — ROSUVASTATIN CALCIUM 20 MG/1
TABLET, COATED ORAL
Qty: 90 TABLET | Refills: 3 | Status: SHIPPED | OUTPATIENT
Start: 2021-12-13

## 2021-12-13 NOTE — TELEPHONE ENCOUNTER
" Disp Refills Start End [facility]   rosuvastatin (CRESTOR) 20 MG tablet 90 tablet 3 12/22/2020  No   Sig: TAKE 1 TABLET EVERY DAY       LOV: 12/22/2020  Future Office visit: No future appointment scheduled at this time. Soon due for follow up appointment. It was recommended that the patient be seen in 1 year post last office visit.     Routing refill request to provider for review/approval because:  Failed protocol    Requested Prescriptions   Pending Prescriptions Disp Refills     rosuvastatin (CRESTOR) 20 MG tablet [Pharmacy Med Name: ROSUVASTATIN 20MG TABLETS] 90 tablet 3     Sig: TAKE 1 TABLET BY MOUTH EVERY DAY       Statins Protocol Failed - 12/13/2021  8:05 AM        Failed - LDL on file in past 12 months     Recent Labs   Lab Test 06/25/19  1055   LDL 68             Passed - No abnormal creatine kinase in past 12 months     No lab results found.             Passed - Recent (12 mo) or future (30 days) visit within the authorizing provider's specialty     Patient has had an office visit with the authorizing provider or a provider within the authorizing providers department within the previous 12 mos or has a future within next 30 days. See \"Patient Info\" tab in inbasket, or \"Choose Columns\" in Meds & Orders section of the refill encounter.              Passed - Medication is active on med list        Passed - Patient is age 18 or older        Passed - No active pregnancy on record        Passed - No positive pregnancy test in past 12 months         Unable to complete prescription refill per RN Medication Refill Policy.................... Carmela Martinez RN ....................  12/13/2021   3:53 PM        "

## 2022-01-05 DIAGNOSIS — M05.79 RHEUMATOID ARTHRITIS INVOLVING MULTIPLE SITES WITH POSITIVE RHEUMATOID FACTOR (H): Primary | ICD-10-CM

## 2022-01-29 ENCOUNTER — HEALTH MAINTENANCE LETTER (OUTPATIENT)
Age: 77
End: 2022-01-29

## 2022-09-17 ENCOUNTER — HEALTH MAINTENANCE LETTER (OUTPATIENT)
Age: 77
End: 2022-09-17

## 2023-01-23 ENCOUNTER — HEALTH MAINTENANCE LETTER (OUTPATIENT)
Age: 78
End: 2023-01-23

## 2023-02-10 ENCOUNTER — TELEPHONE (OUTPATIENT)
Dept: FAMILY MEDICINE | Facility: OTHER | Age: 78
End: 2023-02-10
Payer: COMMERCIAL

## 2023-02-10 NOTE — TELEPHONE ENCOUNTER
Patient called infusion center to inquire if we have received new orders for Simponi Aria in the past few days. Patient was informed that we have not received new orders, but would be happy to assist her with her IV therapies if her provider faxes us the current orders. Patient verbalizes understanding and will contact her rheumatologist.     Ebony Canseco RN on 2/10/2023 at 3:22 PM

## 2023-02-10 NOTE — TELEPHONE ENCOUNTER
Message left on infusion answering machine saying she is in need of an appointment.  This nurse left a general message on patient voice mail asking for a return call. Marlen Rodriguez RN on 2/10/2023 at 12:25 PM

## 2023-05-06 ENCOUNTER — HEALTH MAINTENANCE LETTER (OUTPATIENT)
Age: 78
End: 2023-05-06

## 2023-07-18 ENCOUNTER — ANESTHESIA (OUTPATIENT)
Dept: EMERGENCY MEDICINE | Facility: OTHER | Age: 78
End: 2023-07-18
Payer: COMMERCIAL

## 2023-07-18 ENCOUNTER — ANESTHESIA EVENT (OUTPATIENT)
Dept: EMERGENCY MEDICINE | Facility: OTHER | Age: 78
End: 2023-07-18
Payer: COMMERCIAL

## 2023-07-18 ENCOUNTER — APPOINTMENT (OUTPATIENT)
Dept: GENERAL RADIOLOGY | Facility: OTHER | Age: 78
End: 2023-07-18
Attending: FAMILY MEDICINE
Payer: COMMERCIAL

## 2023-07-18 ENCOUNTER — HOSPITAL ENCOUNTER (EMERGENCY)
Facility: OTHER | Age: 78
Discharge: HOME | End: 2023-07-18
Attending: FAMILY MEDICINE | Admitting: FAMILY MEDICINE
Payer: COMMERCIAL

## 2023-07-18 ENCOUNTER — APPOINTMENT (OUTPATIENT)
Dept: CT IMAGING | Facility: OTHER | Age: 78
End: 2023-07-18
Attending: FAMILY MEDICINE
Payer: COMMERCIAL

## 2023-07-18 VITALS
HEART RATE: 64 BPM | SYSTOLIC BLOOD PRESSURE: 149 MMHG | DIASTOLIC BLOOD PRESSURE: 74 MMHG | TEMPERATURE: 99.2 F | RESPIRATION RATE: 22 BRPM | BODY MASS INDEX: 26.76 KG/M2 | WEIGHT: 137 LBS | OXYGEN SATURATION: 99 %
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
## 2023-07-18 DIAGNOSIS — T07.XXXA MULTIPLE CONTUSIONS: ICD-10-CM

## 2023-07-18 PROCEDURE — 71260 CT THORAX DX C+: CPT

## 2023-07-18 PROCEDURE — 250N000011 HC RX IP 250 OP 636: Performed by: FAMILY MEDICINE

## 2023-07-18 PROCEDURE — 70450 CT HEAD/BRAIN W/O DYE: CPT

## 2023-07-18 PROCEDURE — 99285 EMERGENCY DEPT VISIT HI MDM: CPT | Mod: 25 | Performed by: FAMILY MEDICINE

## 2023-07-18 PROCEDURE — 73030 X-RAY EXAM OF SHOULDER: CPT | Mod: LT

## 2023-07-18 PROCEDURE — 36410 VNPNXR 3YR/> PHY/QHP DX/THER: CPT | Performed by: NURSE ANESTHETIST, CERTIFIED REGISTERED

## 2023-07-18 PROCEDURE — 96374 THER/PROPH/DIAG INJ IV PUSH: CPT | Mod: XU | Performed by: FAMILY MEDICINE

## 2023-07-18 PROCEDURE — 250N000009 HC RX 250: Performed by: NURSE ANESTHETIST, CERTIFIED REGISTERED

## 2023-07-18 PROCEDURE — 73060 X-RAY EXAM OF HUMERUS: CPT | Mod: LT

## 2023-07-18 PROCEDURE — 72125 CT NECK SPINE W/O DYE: CPT

## 2023-07-18 PROCEDURE — 99284 EMERGENCY DEPT VISIT MOD MDM: CPT | Performed by: FAMILY MEDICINE

## 2023-07-18 RX ORDER — NAPROXEN 500 MG/1
500 TABLET ORAL 2 TIMES DAILY WITH MEALS
Qty: 30 TABLET | Refills: 1 | Status: SHIPPED | OUTPATIENT
Start: 2023-07-18 | End: 2023-08-17

## 2023-07-18 RX ORDER — LIDOCAINE HYDROCHLORIDE 10 MG/ML
INJECTION, SOLUTION EPIDURAL; INFILTRATION; INTRACAUDAL; PERINEURAL PRN
Status: DISCONTINUED | OUTPATIENT
Start: 2023-07-18 | End: 2023-07-18

## 2023-07-18 RX ORDER — FENTANYL CITRATE 50 UG/ML
50 INJECTION, SOLUTION INTRAMUSCULAR; INTRAVENOUS
Status: DISCONTINUED | OUTPATIENT
Start: 2023-07-18 | End: 2023-07-18 | Stop reason: HOSPADM

## 2023-07-18 RX ORDER — IOPAMIDOL 755 MG/ML
77 INJECTION, SOLUTION INTRAVASCULAR ONCE
Status: COMPLETED | OUTPATIENT
Start: 2023-07-18 | End: 2023-07-18

## 2023-07-18 RX ADMIN — FENTANYL CITRATE 50 MCG: 50 INJECTION, SOLUTION INTRAMUSCULAR; INTRAVENOUS at 11:44

## 2023-07-18 RX ADMIN — IOPAMIDOL 77 ML: 755 INJECTION, SOLUTION INTRAVENOUS at 12:35

## 2023-07-18 RX ADMIN — LIDOCAINE HYDROCHLORIDE 0.5 ML: 10 INJECTION, SOLUTION EPIDURAL; INFILTRATION; INTRACAUDAL; PERINEURAL at 11:33

## 2023-07-18 ASSESSMENT — ACTIVITIES OF DAILY LIVING (ADL)
ADLS_ACUITY_SCORE: 35
ADLS_ACUITY_SCORE: 33
ADLS_ACUITY_SCORE: 35

## 2023-07-18 NOTE — ED PROVIDER NOTES
History     Chief Complaint   Patient presents with     Fall     Shoulder Pain     Rib Pain     HPI  Rebekah Shirley is a 78 year old female with history of hyperlipidemia, rheumatoid arthritis, CAD, GERD, COPD who presents to the emergency department after a fall at home.  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  Patient went to sit down and fell onto her left side.  Patient did not hit her head.  She fell onto her left shoulder and left chest.  Her left ribs are very painful.  No shortness of breath.  Patient did not pass out or did not feel like she was nearly passing out.  No history of recurrent passing out recently.  No history of headache chest pain shortness of breath nausea vomiting or abdominal pain prior to the fall.    Reviewed nurses notes below, similar history is related to me.  Pt arrives to ER with son by private vehicle. Pt reports that she felt light headed, she went to sit down and fell. Pt reports she landed on her left side, pt does not think she hit her head. Pt reports that her L shoulder and L ribs are very painful.  Allergies:  No Known Allergies    Problem List:    Patient Active Problem List    Diagnosis Date Noted     Rheumatoid arthritis involving multiple sites with positive rheumatoid factor (H) 02/23/2021     Priority: Medium     Per [facility]       Peripheral tear of medial meniscus of right knee as current injury 02/03/2021     Priority: Medium     COPD (chronic obstructive pulmonary disease) (H) 01/12/2021     Priority: Medium     Gastroesophageal reflux disease with esophagitis without hemorrhage 12/22/2020     Priority: Medium     Coronary artery calcification seen on CAT scan 12/22/2020     Priority: Medium     Non-occlusive coronary artery disease based on a cardiac catheterization through [facility] in June, 2019 06/25/2019     Priority: Medium     Hx of cardiac catheterization June, 2019 through [facility] without  stenting 2019     Priority: Medium     Aortic atherosclerosis (H) 2019     Priority: Medium     Elevated coronary artery calcium score on 2019 at 606.7  2019     Priority: Medium     History of tobacco abuse  2019     Priority: Medium     Mixed hyperlipidemia 2018     Priority: Medium     Rheumatoid arthritis (H) 2013     Priority: Medium     Anxiety state 2013     Priority: Medium        Past Medical History:    Past Medical History:   Diagnosis Date     Hyperlipidemia      Nicotine dependence, uncomplicated      Personal history of other medical treatment (CODE)      Rheumatoid arthritis (H)        Past Surgical History:    Past Surgical History:   Procedure Laterality Date     APPENDECTOMY OPEN      Incidental appendectomy at time of adhesion lysis.     COLONOSCOPY      06,Colonoscopy (screening), normal, Dr. Orellana     LAPAROSCOPIC TUBAL LIGATION           LAPAROTOMY EXPLORATORY      History of partial small bowel obstruction necessitating exploratory laparotomy.  Small area of adhesions with secondary lysis felt responsible for the partial obstruction.       Family History:    Family History   Problem Relation Age of Onset     Heart Disease Father 58        MI     Arthritis Mother      Hyperlipidemia Mother      Other - See Comments Sister         Occupational lung disease     Other - See Comments Brother         Laryngeal cancer     Heart Disease Sister         Surgery for heart       Social History:  Marital Status:  Single [1]  Social History     Tobacco Use     Smoking status: Former     Packs/day: 0.25     Years: 15.00     Pack years: 3.75     Types: Cigarettes     Quit date: 2008     Years since quittin.7     Smokeless tobacco: Former     Quit date: 2/15/2008   Vaping Use     Vaping Use: Never used   Substance Use Topics     Alcohol use: No     Drug use: No        Medications:    naproxen (NAPROSYN) 500 MG tablet  amLODIPine (NORVASC) 5  MG tablet  aspirin (ASA) 81 MG chewable tablet  folic acid (FOLVITE) 1 MG tablet  methotrexate 2.5 MG tablet CHEMO  nitroGLYcerin (NITROSTAT) 0.4 MG sublingual tablet  pantoprazole (PROTONIX) 20 MG EC tablet  predniSONE (DELTASONE) 5 MG tablet  rosuvastatin (CRESTOR) 20 MG tablet  sulfaSALAzine (AZULFIDINE) 500 MG tablet          Review of Systems   Constitutional: Negative for activity change, appetite change, chills, fatigue and fever.   HENT: Negative for congestion, rhinorrhea and sore throat.    Eyes: Negative for redness.   Respiratory: Negative for cough and shortness of breath.    Cardiovascular: Negative for chest pain.   Gastrointestinal: Negative for abdominal pain, diarrhea, nausea and vomiting.   Genitourinary: Negative for dysuria and hematuria.   Musculoskeletal: Negative for arthralgias, myalgias and neck stiffness.   Skin: Negative for rash.   Neurological: Negative for dizziness, syncope and headaches.       Physical Exam   BP: 115/71  Pulse: 73  Temp: 99.2  F (37.3  C)  Resp: 22  Weight: 62.1 kg (137 lb)  SpO2: 99 %  Lying Orthostatic BP: 149/70  Lying Orthostatic Pulse: 100 bpm  Sitting Orthostatic BP: 128/81  Sitting Orthostatic Pulse: 96 bpm  Standing Orthostatic BP: 133/79  Standing Orthostatic Pulse: 102 bpm      Physical Exam  Vitals and nursing note reviewed.   Constitutional:       General: She is not in acute distress.     Appearance: Normal appearance. She is not diaphoretic.   HENT:      Head: Atraumatic.      Right Ear: Tympanic membrane normal.      Left Ear: Tympanic membrane normal.      Mouth/Throat:      Mouth: Mucous membranes are moist.   Eyes:      General: No scleral icterus.     Extraocular Movements: Extraocular movements intact.      Conjunctiva/sclera: Conjunctivae normal.      Pupils: Pupils are equal, round, and reactive to light.   Cardiovascular:      Rate and Rhythm: Normal rate.      Heart sounds: Normal heart sounds.   Pulmonary:      Effort: No respiratory  distress.      Breath sounds: Normal breath sounds.   Abdominal:      General: Abdomen is flat.   Musculoskeletal:      Cervical back: Neck supple.   Skin:     General: Skin is warm.      Findings: No rash.   Neurological:      General: No focal deficit present.      Mental Status: She is alert.      Cranial Nerves: No cranial nerve deficit.      Motor: No weakness.      Gait: Gait normal.         ED Course     Serial constitutional, cardiopulmonary and neurologic assessments are consistent with reassuring clinical trajectory here in the ED.    Results for orders placed or performed during the hospital encounter of 07/18/23 (from the past 24 hour(s))   CT Head w/o Contrast    Narrative    PROCEDURE: CT HEAD W/O CONTRAST     HISTORY: fall.    COMPARISON: None.    TECHNIQUE:  Helical images of the head from the foramen magnum to the  vertex were obtained without contrast. This CT exam was performed  using one or more the following dose reduction techniques: automated  exposure control, adjustment of the mA and/or kV according to patient  size, and/or iterative reconstruction technique.    FINDINGS: The ventricles and sulci are normal in volume. No acute  intracranial hemorrhage, mass effect, midline shift, hydrocephalus or  basilar cystern effacement are present.    The grey-white matter interface is preserved.    The calvarium is intact. The mastoid air cells are clear.  The  visualized paranasal sinuses are clear.      Impression    IMPRESSION: No acute intracranial hemorrhage or calvarial fracture.      LOBO DODGE MD         SYSTEM ID:  [id number]   CT Cervical Spine w/o Contrast    Narrative    PROCEDURE: CT CERVICAL SPINE W/O CONTRAST     HISTORY: fall neck ttp.    TECHNIQUE: Helical noncontrast CT images of the cervical spine. This  CT exam was performed using one or more the following dose reduction  techniques: automated exposure control, adjustment of the mA and/or kV  according to patient size, and/or  iterative reconstruction technique.    COMPARISON: 12/21/2020    FINDINGS:     No acute fracture is identified. The vertebral bodies are normal in  height. The cervical lordosis is straightened. The C1-2 articulation  and the craniocervical junction are intact.     Diffuse degenerative changes are seen.     The paravertebral soft tissues are unremarkable. The lung apices are  clear.      Impression    IMPRESSION: No evidence of acute cervical spine fracture.    LOBO DODGE MD         SYSTEM ID:  [id number]   CT Chest/Abdomen/Pelvis w Contrast    Narrative    PROCEDURE: CT CHEST/ABDOMEN/PELVIS W CONTRAST    HISTORY: fall, rib and abd npain    COMPARISON: 4/22/2019    TECHNIQUE:  Initial pre-contrast  and localizer images were  obtained.  Contrast enhanced helical CT of the chest, abdomen and  pelvis was performed.  Routine transaxial and post-processed  (multiplanar and/or MIP) reformations were obtained. This CT exam was  performed using one or more the following dose reduction techniques:  automated exposure control, adjustment of the mA and/or kV according  to patient size, and/or iterative reconstruction technique.    MEDS/CONTRAST: 77 mL of Isovue-370    FINDINGS:     No periaortic or mediastinal hematoma is seen. No evidence of acute  aortic injury is seen. The cardiac size is unchanged. Coronary  calcifications are seen. No pericardial effusion is present. A  moderate hiatal hernia is chronic. No pneumothorax. Dependent  atelectasis.    The liver, spleen, pancreas, kidneys and adrenal glands demonstrate no  evidence of laceration. There is no hydronephrosis. The gallbladder is  distended.    The infrarenal aorta is dilated up to 2.6 cm.    The bladder is distended.  There are no abnormally dilated loops of  bowel. Numerous colonic diverticula are seen without acute  inflammation. No free air or free fluid is seen.    No acute fracture or suspicious osseous lesion is seen. Seventh and  eighth chronic  posterior right rib fractures are unchanged, healed.      Impression    IMPRESSION:     No pneumothorax or evidence of acute rib fracture. Chronic healed  posterior right rib fractures.    LOBO DODGE MD         SYSTEM ID:  [id number]   XR Shoulder Left 2 Views    Narrative    PROCEDURE:  XR SHOULDER LEFT 2 VIEWS, XR HUMERUS LEFT G/E 2 VIEWS    HISTORY: fall pain, bruising.    COMPARISON:  12/21/2020    TECHNIQUE:  2 views left shoulder. 2 views left humerus    FINDINGS:  No evidence of acute fracture. Chronic rotator cuff rupture  and osteoarthritic change of the left shoulder.     LOBO DODGE MD         SYSTEM ID:  [id number]   XR Humerus Left G/E 2 Views    Narrative    PROCEDURE:  XR SHOULDER LEFT 2 VIEWS, XR HUMERUS LEFT G/E 2 VIEWS    HISTORY: fall pain, bruising.    COMPARISON:  12/21/2020    TECHNIQUE:  2 views left shoulder. 2 views left humerus    FINDINGS:  No evidence of acute fracture. Chronic rotator cuff rupture  and osteoarthritic change of the left shoulder.     LOBO DODGE MD         SYSTEM ID:  [id number]       Medications   iopamidol (ISOVUE-370) solution 77 mL (77 mLs Intravenous $Given 7/18/23 1235)       Assessments & Plan (with Medical Decision Making)     I have reviewed the nursing notes.    I have reviewed the findings, diagnosis, plan and need for follow up with the patient.  Given patient's comorbidities she is at increased risk for fracture with minimal mechanism thus extensive imaging was completed as above.  Fortunately nothing appears to be fractured.  CTs are reassuring against severe injury.  Only plain films were obtained of the left shoulder and left humerus.  Occult fractures on these x-rays are less unlikely and I have low clinical suspicion for occult fracture moreover missing a small nondisplaced occult fracture in the shoulder humerus has little clinical implication.  Thus will not proceed with CTs of those areas at this time.  We will sling for comfort and  gradual increase in range of motion as tolerated.  She should return to the emergency department with any new headache, recurrent fall, shortness of breath, worsening symptoms, fever or identification of other areas of pain that were not identified in today's visit.  We discussed her episode of lightheadedness, she did not have syncope or near syncope.  Her serial neurologic exams are negative here in the ED.  Her serial cardiopulmonary exams are negative.  Offered further laboratory work-up, patient declined after shared decision-making conversation as she feels this just occurred because she was working in a very hot kitchen.  She states coworkers have had the same problem in the past.  Encouraged her to go home, rest drink plenty of fluids.  Do not go back to work today.  Return to ED with recurrent lightheadedness, any near-syncope, syncope, headache, visual changes or strokelike symptoms.  After shared decision-making conversation no further interventions or diagnostics will be completed at this time.  Utilization of immobility with a sling and Naprosyn will be utilized as pain control modalities.  Patient verbalized understanding of plan is in agreement, she left the ED in improving condition.  Orthostatics are negative.  Offered PT and Ortho referral.  Due to patient's insurance she wants to follow-up in the [facility] system in the clinic across the street.      Return to ED with recurrent Pain, uncontrolled pain, lightheadedness or near syncope.        Discharge Medication List as of 7/18/2023  2:58 PM      START taking these medications    Details   naproxen (NAPROSYN) 500 MG tablet Take 1 tablet (500 mg) by mouth 2 times daily (with meals) for 30 days, Disp-30 tablet, R-1, E-Prescribe             Final diagnoses:   Fall, initial encounter   Multiple contusions       7/18/2023   [facility]  07/19/23 [id number]

## 2023-07-18 NOTE — ED TRIAGE NOTES
Pt arrives to ER with son by private vehicle. Pt reports that she felt light headed, she went to sit down and fell. Pt reports she landed on her left side, pt does not think she hit her head. Pt reports that her L shoulder and L ribs are very painful.

## 2023-07-19 ASSESSMENT — ENCOUNTER SYMPTOMS
ACTIVITY CHANGE: 0
FEVER: 0
EYE REDNESS: 0
COUGH: 0
CHILLS: 0
DIZZINESS: 0
FATIGUE: 0
APPETITE CHANGE: 0
ABDOMINAL PAIN: 0
NECK STIFFNESS: 0
NAUSEA: 0
VOMITING: 0
ARTHRALGIAS: 0
RHINORRHEA: 0
DIARRHEA: 0
MYALGIAS: 0
DYSURIA: 0
SORE THROAT: 0
SHORTNESS OF BREATH: 0
HEADACHES: 0
HEMATURIA: 0

## 2023-07-22 ENCOUNTER — OFFICE VISIT (OUTPATIENT)
Dept: FAMILY MEDICINE | Facility: OTHER | Age: 78
End: 2023-07-22
Payer: OTHER MISCELLANEOUS

## 2023-07-22 VITALS
HEIGHT: 57 IN | WEIGHT: 139.6 LBS | SYSTOLIC BLOOD PRESSURE: 132 MMHG | HEART RATE: 76 BPM | BODY MASS INDEX: 30.12 KG/M2 | TEMPERATURE: 98.6 F | OXYGEN SATURATION: 97 % | DIASTOLIC BLOOD PRESSURE: 70 MMHG | RESPIRATION RATE: 16 BRPM

## 2023-07-22 DIAGNOSIS — S20.20XA TRAUMATIC ECCHYMOSIS OF CHEST, INITIAL ENCOUNTER: Primary | ICD-10-CM

## 2023-07-22 PROCEDURE — 99213 OFFICE O/P EST LOW 20 MIN: CPT | Performed by: NURSE PRACTITIONER

## 2023-07-22 RX ORDER — CELECOXIB 100 MG/1
1 CAPSULE ORAL 2 TIMES DAILY
COMMUNITY
Start: 2023-06-23

## 2023-07-22 ASSESSMENT — PAIN SCALES - GENERAL: PAINLEVEL: NO PAIN (0)

## 2023-07-22 NOTE — PROGRESS NOTES
ASSESSMENT/PLAN:    I have reviewed the nursing notes.  I have reviewed the findings, diagnosis, plan and need for follow up with the patient.    1. Traumatic ecchymosis of chest, initial encounter  Reviewed results from recent ER visit which were benign for fractures and other serious acute injury following trauma on 2022. The bruising she now has is mild without evidence of underlying hematoma. Recommend ice for swelling and discomfort. Watch the site. No acute concerns today. Reassurance provided. No additional imaging is warranted at this time. Patient understanding of this.     Discussed warning signs/symptoms indicative of need to f/u    Follow up if symptoms persist or worsen or concerns    I explained my diagnostic considerations and recommendations to the patient, who voiced understanding and agreement with the treatment plan. All questions were answered. We discussed potential side effects of any prescribed or recommended therapies, as well as expectations for response to treatments.    Tammie Ross NP  2023  11:58 AM    HPI:  Rebekah Shirley is a 78 year old female who presents to [facility] today for concerns of bruising that occurred about 2 days after an injury that occurred on 2023. She was concerned about this wanted it checked out. Not on blood thinners. She has no change in her baseline ROM since the injury where she landed on a cement ground. She works in the kitchen at [employer]. The pain is tolerable. She has been taking nsaids, twice daily.     ROS otherwise negative.         Past Medical History:   Diagnosis Date     Hyperlipidemia     No Comments Provided     Nicotine dependence, uncomplicated     No Comments Provided     Personal history of other medical treatment (CODE)      4, para 3.     Rheumatoid arthritis (H)      Past Surgical History:   Procedure Laterality Date     APPENDECTOMY OPEN      Incidental appendectomy at time of adhesion lysis.      COLONOSCOPY      06,Colonoscopy (screening), normal, Dr. Orellana     LAPAROSCOPIC TUBAL LIGATION           LAPAROTOMY EXPLORATORY      History of partial small bowel obstruction necessitating exploratory laparotomy.  Small area of adhesions with secondary lysis felt responsible for the partial obstruction.     Social History     Tobacco Use     Smoking status: Former     Packs/day: 0.25     Years: 15.00     Pack years: 3.75     Types: Cigarettes     Quit date: 2008     Years since quittin.7     Smokeless tobacco: Former     Quit date: 2/15/2008   Substance Use Topics     Alcohol use: No     Current Outpatient Medications   Medication Sig Dispense Refill     amLODIPine (NORVASC) 5 MG tablet Take 1 tablet (5 mg) by mouth daily 90 tablet 3     aspirin (ASA) 81 MG chewable tablet Take 1 tablet (81 mg) by mouth daily 90 tablet 3     celecoxib (CELEBREX) 100 MG capsule Take 1 capsule by mouth 2 times daily       diclofenac (VOLTAREN) 1 % topical gel Apply 4 g topically Apply on hands, wrists and ankles as needed for pain       folic acid (FOLVITE) 1 MG tablet Take 1 mg by mouth daily       methotrexate 2.5 MG tablet CHEMO Take 25 mg by mouth every 7 days       naproxen (NAPROSYN) 500 MG tablet Take 1 tablet (500 mg) by mouth 2 times daily (with meals) for 30 days 30 tablet 1     nitroGLYcerin (NITROSTAT) 0.4 MG sublingual tablet Place 1 tablet (0.4 mg) under the tongue every 5 minutes as needed for chest pain 25 tablet 3     pantoprazole (PROTONIX) 20 MG EC tablet TAKE 2 TABLETS(40 MG) BY MOUTH DAILY 180 tablet 3     predniSONE (DELTASONE) 5 MG tablet TAKE 1 TABLET EVERY DAY       rosuvastatin (CRESTOR) 20 MG tablet TAKE 1 TABLET BY MOUTH EVERY DAY 90 tablet 3     sulfaSALAzine (AZULFIDINE) 500 MG tablet TAKE 2 TABLETS BY MOUTH EVERY MORNING AND 3 TABLETS EVERY EVENING       No Known Allergies  Past medical history, past surgical history, current medications and allergies reviewed and accurate to the  "best of my knowledge.      ROS:  Refer to HPI    /70   Pulse 76   Temp 98.6  F (37  C) (Tympanic)   Resp 16   Ht 1.448 m (4' 9\")   Wt 63.3 kg (139 lb 9.6 oz)   LMP 07/22/1993   SpO2 97%   Breastfeeding No   BMI 30.21 kg/m      EXAM:  General Appearance: Well appearing 78 year old female, appropriate appearance for age. No acute distress   Respiratory: normal chest wall and respirations.  Normal effort.  Clear to auscultation bilaterally, no wheezing, crackles or rhonchi.  No increased work of breathing.  No cough appreciated.  Cardiac: RRR with no murmurs  Musculoskeletal:  Equal movement of bilateral upper extremities.  Equal movement of bilateral lower extremities.  Normal gait.    Dermatological: left anterior upper chest above breast, there is ecchymosis in an area approximately 10 cm in length at its longest. There is no underlying firmness. The site is mildly tender. The skin is intact. There is no obvious swelling.   Neuro: Alert and oriented to person, place, and time.  Psychological: normal affect, alert, oriented, and pleasant.     "

## 2023-07-22 NOTE — NURSING NOTE
"Chief Complaint   Patient presents with     Bleeding/Bruising     Fell at work 7/18/23, went to the ER and had xrays and lab, 2 days later bruising appeared on her left chest.         Initial /70   Pulse 76   Temp 98.6  F (37  C) (Tympanic)   Resp 16   Ht 1.448 m (4' 9\")   Wt 63.3 kg (139 lb 9.6 oz)   LMP 07/22/1993   SpO2 97%   Breastfeeding No   BMI 30.21 kg/m   Estimated body mass index is 30.21 kg/m  as calculated from the following:    Height as of this encounter: 1.448 m (4' 9\").    Weight as of this encounter: 63.3 kg (139 lb 9.6 oz).         Norma J. Gosselin, LPN   "

## 2023-07-22 NOTE — LETTER
July 22, 2023      Rebekah Shirley  [address]  [facility] [address]        To Whom It May Concern:    Rebekah Shirley was seen in our clinic today on 7/22/2023. She may return to work without restrictions once her pain has resolved and she is feeling improved and able to perform her job duties.       Sincerely,        Tammie Ross NP

## 2023-09-07 ENCOUNTER — PATIENT OUTREACH (OUTPATIENT)
Dept: FAMILY MEDICINE | Facility: OTHER | Age: 78
End: 2023-09-07
Payer: COMMERCIAL

## 2023-09-07 NOTE — LETTER
[facility]  [address]  [place] [address]  [phone]       September 7, 2023    Rebekah Shirley  [address]  [place] [address]    Dear Rebekah,    We care about your health and have reviewed your health plan and are making recommendations based on this review, to optimize your health.    You are in particular need of attention regarding:  -Wellness (Physical) Visit     We are recommending that you:  -schedule a WELLNESS (Physical) APPOINTMENT with me.        In addition, here is a list of due or overdue Health Maintenance reminders.    Health Maintenance Due   Topic Date Due    Osteoporosis Screening  Never done    URINE DRUG SCREEN  Never done    Discuss Advance Care Planning  Never done    COPD Action Plan  Never done    Hepatitis C Screening  Never done    Zoster (Shingles) Vaccine (1 of 2) Never done    Diptheria Tetanus Pertussis (DTAP/TDAP/TD) Vaccine (1 - Tdap) 01/02/2004    Annual Wellness Visit  Never done    Pneumococcal Vaccine (3 - PPSV23 or PCV20) 06/12/2018    FALL RISK ASSESSMENT  12/22/2021    COVID-19 Vaccine (4 - Pfizer risk series) 02/17/2022    Flu Vaccine (1) 09/01/2023   To address the above recommendations, we encourage you to contact us at [phone]. They will assist you with finding the most convenient time and location.    Thank you for trusting [facility] [place] and we appreciate the opportunity to serve you.  We look forward to supporting your healthcare needs in the future.    Healthy Regards,  Your [facility] Team

## 2023-09-07 NOTE — TELEPHONE ENCOUNTER
Patient Quality Outreach    Patient is due for the following:   Physical Annual Wellness Visit    Next Steps:   Schedule a Annual Wellness Visit    Type of outreach:    Sent letter.      Questions for provider review:    None           Laura Velázquez

## 2024-03-16 ENCOUNTER — NURSE TRIAGE (OUTPATIENT)
Dept: NURSING | Facility: CLINIC | Age: 79
End: 2024-03-16
Payer: COMMERCIAL

## 2024-03-16 ENCOUNTER — HOSPITAL ENCOUNTER (EMERGENCY)
Facility: OTHER | Age: 79
Discharge: HOME | End: 2024-03-16
Attending: FAMILY MEDICINE | Admitting: FAMILY MEDICINE
Payer: COMMERCIAL

## 2024-03-16 VITALS
TEMPERATURE: 98.1 F | RESPIRATION RATE: 18 BRPM | DIASTOLIC BLOOD PRESSURE: 72 MMHG | WEIGHT: 130 LBS | OXYGEN SATURATION: 91 % | HEART RATE: 107 BPM | BODY MASS INDEX: 28.05 KG/M2 | HEIGHT: 57 IN | SYSTOLIC BLOOD PRESSURE: 124 MMHG

## 2024-03-16 DIAGNOSIS — M06.9 RHEUMATOID ARTHRITIS INVOLVING MULTIPLE SITES, UNSPECIFIED WHETHER RHEUMATOID FACTOR PRESENT (H): ICD-10-CM

## 2024-03-16 LAB
ACANTHOCYTES BLD QL SMEAR: NORMAL
ALBUMIN SERPL BCG-MCNC: 3.4 G/DL (ref 3.5–5.2)
ALP SERPL-CCNC: 96 U/L (ref 40–150)
ALT SERPL W P-5'-P-CCNC: 6 U/L (ref 0–50)
ANION GAP SERPL CALCULATED.3IONS-SCNC: 15 MMOL/L (ref 7–15)
AST SERPL W P-5'-P-CCNC: 16 U/L (ref 0–45)
AUER BODIES BLD QL SMEAR: NORMAL
BASO STIPL BLD QL SMEAR: NORMAL
BASOPHILS # BLD AUTO: 0.1 10E3/UL (ref 0–0.2)
BASOPHILS NFR BLD AUTO: 1 %
BILIRUB SERPL-MCNC: 0.2 MG/DL
BITE CELLS BLD QL SMEAR: NORMAL
BLISTER CELLS BLD QL SMEAR: NORMAL
BUN SERPL-MCNC: 12.9 MG/DL (ref 8–23)
BURR CELLS BLD QL SMEAR: NORMAL
CALCIUM SERPL-MCNC: 9 MG/DL (ref 8.8–10.2)
CHLORIDE SERPL-SCNC: 105 MMOL/L (ref 98–107)
CREAT SERPL-MCNC: 0.59 MG/DL (ref 0.51–0.95)
CRP SERPL-MCNC: 178.48 MG/L
DACRYOCYTES BLD QL SMEAR: NORMAL
DEPRECATED HCO3 PLAS-SCNC: 19 MMOL/L (ref 22–29)
EGFRCR SERPLBLD CKD-EPI 2021: >90 ML/MIN/1.73M2
ELLIPTOCYTES BLD QL SMEAR: NORMAL
EOSINOPHIL # BLD AUTO: 0.3 10E3/UL (ref 0–0.7)
EOSINOPHIL NFR BLD AUTO: 3 %
ERYTHROCYTE [DISTWIDTH] IN BLOOD BY AUTOMATED COUNT: 17.8 % (ref 10–15)
ERYTHROCYTE [SEDIMENTATION RATE] IN BLOOD BY WESTERGREN METHOD: 101 MM/HR (ref 0–30)
FLUAV RNA SPEC QL NAA+PROBE: NEGATIVE
FLUBV RNA RESP QL NAA+PROBE: NEGATIVE
FRAGMENTS BLD QL SMEAR: NORMAL
GIANT PLATELETS BLD QL SMEAR: NORMAL
GLUCOSE SERPL-MCNC: 129 MG/DL (ref 70–99)
HCT VFR BLD AUTO: 30 % (ref 35–47)
HGB BLD-MCNC: 9.4 G/DL (ref 11.7–15.7)
HGB C CRYSTALS: NORMAL
HOLD SPECIMEN: NORMAL
HOWELL-JOLLY BOD BLD QL SMEAR: NORMAL
IMM GRANULOCYTES # BLD: 0 10E3/UL
IMM GRANULOCYTES NFR BLD: 1 %
LYMPHOCYTES # BLD AUTO: 0.9 10E3/UL (ref 0.8–5.3)
LYMPHOCYTES NFR BLD AUTO: 10 %
MCH RBC QN AUTO: 26.5 PG (ref 26.5–33)
MCHC RBC AUTO-ENTMCNC: 31.3 G/DL (ref 31.5–36.5)
MCV RBC AUTO: 85 FL (ref 78–100)
MONOCYTES # BLD AUTO: 0.7 10E3/UL (ref 0–1.3)
MONOCYTES NFR BLD AUTO: 8 %
NEUTROPHILS # BLD AUTO: 6.6 10E3/UL (ref 1.6–8.3)
NEUTROPHILS NFR BLD AUTO: 78 %
NEUTS HYPERSEG BLD QL SMEAR: NORMAL
NRBC # BLD AUTO: 0 10E3/UL
NRBC BLD AUTO-RTO: 0 /100
PATH REV: NORMAL
PLAT MORPH BLD: NORMAL
PLATELET # BLD AUTO: 399 10E3/UL (ref 150–450)
POLYCHROMASIA BLD QL SMEAR: NORMAL
POTASSIUM SERPL-SCNC: 3.9 MMOL/L (ref 3.4–5.3)
PROT SERPL-MCNC: 7.5 G/DL (ref 6.4–8.3)
RBC # BLD AUTO: 3.55 10E6/UL (ref 3.8–5.2)
RBC AGGLUT BLD QL: NORMAL
RBC MORPH BLD: NORMAL
ROULEAUX BLD QL SMEAR: NORMAL
RSV RNA SPEC NAA+PROBE: NEGATIVE
SARS-COV-2 RNA RESP QL NAA+PROBE: NEGATIVE
SICKLE CELLS BLD QL SMEAR: NORMAL
SMUDGE CELLS BLD QL SMEAR: NORMAL
SODIUM SERPL-SCNC: 139 MMOL/L (ref 135–145)
SPHEROCYTES BLD QL SMEAR: NORMAL
STOMATOCYTES BLD QL SMEAR: NORMAL
TARGETS BLD QL SMEAR: NORMAL
TOXIC GRANULES BLD QL SMEAR: NORMAL
VARIANT LYMPHS BLD QL SMEAR: NORMAL
WBC # BLD AUTO: 8.5 10E3/UL (ref 4–11)

## 2024-03-16 PROCEDURE — 250N000012 HC RX MED GY IP 250 OP 636 PS 637: Performed by: FAMILY MEDICINE

## 2024-03-16 PROCEDURE — 85004 AUTOMATED DIFF WBC COUNT: CPT | Performed by: FAMILY MEDICINE

## 2024-03-16 PROCEDURE — 87637 SARSCOV2&INF A&B&RSV AMP PRB: CPT | Performed by: FAMILY MEDICINE

## 2024-03-16 PROCEDURE — 85652 RBC SED RATE AUTOMATED: CPT | Performed by: FAMILY MEDICINE

## 2024-03-16 PROCEDURE — 99284 EMERGENCY DEPT VISIT MOD MDM: CPT | Performed by: FAMILY MEDICINE

## 2024-03-16 PROCEDURE — 86140 C-REACTIVE PROTEIN: CPT | Performed by: FAMILY MEDICINE

## 2024-03-16 PROCEDURE — 80053 COMPREHEN METABOLIC PANEL: CPT | Performed by: FAMILY MEDICINE

## 2024-03-16 PROCEDURE — 250N000013 HC RX MED GY IP 250 OP 250 PS 637: Performed by: FAMILY MEDICINE

## 2024-03-16 PROCEDURE — 36415 COLL VENOUS BLD VENIPUNCTURE: CPT | Performed by: FAMILY MEDICINE

## 2024-03-16 RX ORDER — PREDNISONE 10 MG/1
TABLET ORAL
Qty: 7 TABLET | Refills: 0 | Status: SHIPPED | OUTPATIENT
Start: 2024-03-16 | End: 2024-03-22

## 2024-03-16 RX ORDER — CELECOXIB 100 MG/1
100 CAPSULE ORAL ONCE
Status: DISCONTINUED | OUTPATIENT
Start: 2024-03-16 | End: 2024-03-16

## 2024-03-16 RX ORDER — PREDNISONE 5 MG/1
5 TABLET ORAL ONCE
Status: DISCONTINUED | OUTPATIENT
Start: 2024-03-16 | End: 2024-03-16

## 2024-03-16 RX ORDER — OXYCODONE AND ACETAMINOPHEN 5; 325 MG/1; MG/1
1 TABLET ORAL EVERY 6 HOURS PRN
Qty: 12 TABLET | Refills: 0 | Status: SHIPPED | OUTPATIENT
Start: 2024-03-16 | End: 2024-03-19

## 2024-03-16 RX ORDER — OXYCODONE AND ACETAMINOPHEN 5; 325 MG/1; MG/1
1 TABLET ORAL ONCE
Status: COMPLETED | OUTPATIENT
Start: 2024-03-16 | End: 2024-03-16

## 2024-03-16 RX ORDER — PREDNISONE 20 MG/1
20 TABLET ORAL ONCE
Status: COMPLETED | OUTPATIENT
Start: 2024-03-16 | End: 2024-03-16

## 2024-03-16 RX ORDER — ACETAMINOPHEN 500 MG
500 TABLET ORAL ONCE
Status: COMPLETED | OUTPATIENT
Start: 2024-03-16 | End: 2024-03-16

## 2024-03-16 RX ADMIN — PREDNISONE 20 MG: 20 TABLET ORAL at 16:37

## 2024-03-16 RX ADMIN — OXYCODONE HYDROCHLORIDE AND ACETAMINOPHEN 1 TABLET: 5; 325 TABLET ORAL at 16:37

## 2024-03-16 RX ADMIN — ACETAMINOPHEN 500 MG: 500 TABLET, FILM COATED ORAL at 16:37

## 2024-03-16 ASSESSMENT — ENCOUNTER SYMPTOMS
SHORTNESS OF BREATH: 0
CHEST TIGHTNESS: 0
FEVER: 0
COUGH: 0
JOINT SWELLING: 1
BACK PAIN: 1
VOMITING: 0
FATIGUE: 0
ARTHRALGIAS: 1
CHOKING: 0
DIARRHEA: 0
CONSTIPATION: 0
NAUSEA: 0

## 2024-03-16 ASSESSMENT — ACTIVITIES OF DAILY LIVING (ADL)
ADLS_ACUITY_SCORE: 34
ADLS_ACUITY_SCORE: 36
ADLS_ACUITY_SCORE: 36

## 2024-03-16 ASSESSMENT — COLUMBIA-SUICIDE SEVERITY RATING SCALE - C-SSRS
2. HAVE YOU ACTUALLY HAD ANY THOUGHTS OF KILLING YOURSELF IN THE PAST MONTH?: NO
1. IN THE PAST MONTH, HAVE YOU WISHED YOU WERE DEAD OR WISHED YOU COULD GO TO SLEEP AND NOT WAKE UP?: NO
6. HAVE YOU EVER DONE ANYTHING, STARTED TO DO ANYTHING, OR PREPARED TO DO ANYTHING TO END YOUR LIFE?: NO

## 2024-03-16 NOTE — TELEPHONE ENCOUNTER
Nurse Triage SBAR    Is this a 2nd Level Triage? NO    Situation: Severe body pains that is not improving with current pain management strategies    Consent: obtained verbally from patient    Background: Started on Hydrocodone/Acetaminophen by her primary    Assessment: pain in whole body- RA  Pain has been present for a week- severe pain  Not helping her pain and is making it worse  Pain is worse in her neck and joints  No fever  No additional symptoms   No dark colored or red urine   Last dose was around 5 am today and the pain has gotten progressively worse since then       Protocol Recommended Disposition:       Recommendation: Advised to be seen within the next 4 hours- patient able to get to ER at [facility]. Lucille will take her there now. Declines additional questions.      ER/rapid clinic at [facility] recommended    Does the patient meet one of the following criteria for ADS visit consideration? No      Kristal Davsi RN 2:40 PM 3/16/2024  Reason for Disposition   [1] SEVERE pain (e.g., excruciating, unable to do any normal activities) AND [2] not improved 2 hours after pain medicine    Additional Information   Negative: Shock suspected (e.g., cold/pale/clammy skin, too weak to stand, low BP, rapid pulse)   Negative: Difficult to awaken or acting confused (e.g., disoriented, slurred speech)   Negative: Sounds like a life-threatening emergency to the triager   Negative: Chest pain   Negative: Arm pains with exertion (e.g., walking)   Negative: Muscle aches from influenza (flu) suspected   Negative: Muscle aches from heat exposure suspected   Negative: Muscle jerks, twitches, or spasm of the body or body part   Negative: Sickle cell pain crisis (sickle cell attack) suspected   Negative: Lyme disease suspected (e.g., bull's eye rash or tick bite / exposure in past month)   Negative: Pain only in back   Negative: Pain in one arm OR arm pains caused by recent vigorous activity (e.g., sports, lifting,  overuse)   Negative: Pain in one leg OR leg pains caused by recent vigorous activity (e.g., sports, lifting, overuse)   Negative: Rash over large area or most of the body (widespread or generalized)   Negative: Dark (cola or tea-colored) or red-colored urine   Negative: [1] Drinking very little AND [2] dehydration suspected (e.g., no urine > 12 hours, very dry mouth, very lightheaded)   Negative: Patient sounds very sick or weak to the triager    Protocols used: Muscle Aches and Body Pain-A-AH

## 2024-03-16 NOTE — DISCHARGE INSTRUCTIONS
For better pain control you really need to take either your steroid or Celebrex every day and Tylenol every day.  I know at times it feels like it is not working but in reality what is happening is this is helping keep your pain at a more manageable level.  When you discontinue it that is when your pain goes quite high and it can be very challenging to get your pain under control again.  I have given you prescription for narcotic medication which was provided to you by your regular doctor.  These can be potentially addictive.  These are used for breakthrough pain or pain that is not otherwise well-controlled by your steroid and your Tylenol.  I see that you have previously been on hydroxychloroquine and methotrexate.  For better symptom control it may be reasonable to be back on these medications.  I understand sometimes cost can be a factor.  Please touch base with your regular doctor soon in the next 1 to 2 weeks for ongoing pain management plan and your rheumatology team.

## 2024-03-16 NOTE — ED PROVIDER NOTES
History     Chief Complaint   Patient presents with    Joint Pain     HPI  Rebekah Shirley is a 78 year old female who presents with ongoing severe joint pain.  She complains of wrist pain finger pain foot pain knee pain and ankle pain.  She was recently treated with a prednisone taper.  She did not think it was significantly helpful though she does admit that her pain is worse now that she is off the prednisone.  She has not been compliant with daily Celebrex and Tylenol.  She is also not been compliant with her methotrexate and  Plaquenil.  Some of the interventions that were recommended for her by rheumatology were cost prohibitive.  She recently saw her regular doctor in clinic and was prescribed a few tablets of Percocet for her pain.  She is taking these and ran out of them this morning.  She is here for increasing pain.  No fevers or chills.  No nausea or vomiting.  She has not been able to work due to the pain.  She is accompanied by her grandson.    Allergies:  No Known Allergies    Problem List:    Patient Active Problem List    Diagnosis Date Noted    Rheumatoid arthritis involving multiple sites with positive rheumatoid factor (H) 02/23/2021     Priority: Medium     Per [facility]      Peripheral tear of medial meniscus of right knee as current injury 02/03/2021     Priority: Medium    COPD (chronic obstructive pulmonary disease) (H) 01/12/2021     Priority: Medium    Gastroesophageal reflux disease with esophagitis without hemorrhage 12/22/2020     Priority: Medium    Coronary artery calcification seen on CAT scan 12/22/2020     Priority: Medium    Non-occlusive coronary artery disease based on a cardiac catheterization through [facility] in June, 2019 06/25/2019     Priority: Medium    Hx of cardiac catheterization June, 2019 through [facility] without stenting 06/25/2019     Priority: Medium    Aortic atherosclerosis (H24) 05/09/2019     Priority: Medium    Elevated coronary artery calcium  score on 4/22/2019 at 606.7  05/08/2019     Priority: Medium    History of tobacco abuse  05/08/2019     Priority: Medium    Mixed hyperlipidemia 01/23/2018     Priority: Medium    Rheumatoid arthritis (H) 08/20/2013     Priority: Medium    Anxiety state 05/06/2013     Priority: Medium        Past Medical History:    Past Medical History:   Diagnosis Date    Hyperlipidemia     Nicotine dependence, uncomplicated     Personal history of other medical treatment (CODE)     Rheumatoid arthritis (H)        Past Surgical History:    Past Surgical History:   Procedure Laterality Date    APPENDECTOMY OPEN  1993    Incidental appendectomy at time of adhesion lysis.    COLONOSCOPY      7/19/06,Colonoscopy (screening), normal, Dr. Orellana    LAPAROSCOPIC TUBAL LIGATION      1982    LAPAROTOMY EXPLORATORY      History of partial small bowel obstruction necessitating exploratory laparotomy.  Small area of adhesions with secondary lysis felt responsible for the partial obstruction.       Family History:    Family History   Problem Relation Age of Onset    Heart Disease Father 58        MI    Arthritis Mother     Hyperlipidemia Mother     Other - See Comments Sister         Occupational lung disease    Other - See Comments Brother         Laryngeal cancer    Heart Disease Sister         Surgery for heart       Social History:  Marital Status:  Single [1]  Social History     Tobacco Use    Smoking status: Former     Packs/day: 0.25     Years: 15.00     Additional pack years: 0.00     Total pack years: 3.75     Types: Cigarettes     Quit date: 11/5/2008     Years since quitting: 15.3    Smokeless tobacco: Former     Quit date: 2/15/2008   Vaping Use    Vaping Use: Never used   Substance Use Topics    Alcohol use: No    Drug use: No        Medications:    oxyCODONE-acetaminophen (PERCOCET) 5-325 MG tablet  predniSONE (DELTASONE) 10 MG tablet  amLODIPine (NORVASC) 5 MG tablet  aspirin (ASA) 81 MG chewable tablet  celecoxib (CELEBREX)  "100 MG capsule  diclofenac (VOLTAREN) 1 % topical gel  folic acid (FOLVITE) 1 MG tablet  methotrexate 2.5 MG tablet CHEMO  nitroGLYcerin (NITROSTAT) 0.4 MG sublingual tablet  pantoprazole (PROTONIX) 20 MG EC tablet  rosuvastatin (CRESTOR) 20 MG tablet  sulfaSALAzine (AZULFIDINE) 500 MG tablet          Review of Systems   Constitutional:  Negative for fatigue and fever.   Respiratory:  Negative for cough, choking, chest tightness and shortness of breath.    Cardiovascular:  Negative for chest pain and leg swelling.   Gastrointestinal:  Negative for constipation, diarrhea, nausea and vomiting.   Musculoskeletal:  Positive for arthralgias, back pain and joint swelling.   Skin:  Negative for rash.       Physical Exam   BP: (!) 172/75  Pulse: 111  Temp: 98.1  F (36.7  C)  Resp: 18  Height: 144.8 cm (4' 9\")  Weight: 59 kg (130 lb)  SpO2: 94 %      Physical Exam  Constitutional:       General: She is not in acute distress.     Appearance: Normal appearance. She is not diaphoretic.      Comments: Patient in discomfort.  Chronically ill-appearing and frail.   HENT:      Head: Atraumatic.      Mouth/Throat:      Mouth: Mucous membranes are moist.   Eyes:      General: No scleral icterus.     Conjunctiva/sclera: Conjunctivae normal.   Cardiovascular:      Rate and Rhythm: Normal rate.      Heart sounds: Normal heart sounds.   Pulmonary:      Effort: No respiratory distress.      Breath sounds: Normal breath sounds.   Musculoskeletal:      Cervical back: Neck supple.      Comments: Significant swelling of the joints of the hand and wrist knees   Skin:     General: Skin is warm and dry.      Findings: No rash.   Neurological:      Mental Status: She is alert.         ED Course     ED Course as of 03/16/24 1756   Sat Mar 16, 2024   1620 Patient has not been taking NSAIDs or Tylenol daily.  She was recently on a steroid burst but feels the lower dose has not been helpful.  She ran out of her Norco today.  She is here with her " mis.     Procedures              Critical Care time:  none               Results for orders placed or performed during the hospital encounter of 03/16/24 (from the past 24 hour(s))   Asymptomatic Influenza A/B, RSV, & SARS-CoV2 PCR (COVID-19) Nose    Specimen: Nose; Swab   Result Value Ref Range    Influenza A PCR Negative Negative    Influenza B PCR Negative Negative    RSV PCR Negative Negative    SARS CoV2 PCR Negative Negative    Narrative    Testing was performed using the Xpert Xpress CoV2/Flu/RSV Assay on the [employer] GeneXpert Instrument. This test should be ordered for the detection of SARS-CoV-2, influenza, and RSV viruses in individuals who meet clinical and/or epidemiological criteria. Test performance is unknown in asymptomatic patients. This test is for in vitro diagnostic use under the FDA EUA for laboratories certified under CLIA to perform high or moderate complexity testing. This test has not been FDA cleared or approved. A negative result does not rule out the presence of PCR inhibitors in the specimen or target RNA in concentration below the limit of detection for the assay. If only one viral target is positive but coinfection with multiple targets is suspected, the sample should be re-tested with another FDA cleared, approved, or authorized test, if coinfection would change clinical management. This test was validated by the [facility]. These laboratories are certified under the Clinical Laboratory Improvement Amendments of 1988 (CLIA-88) as qualified to perform high complexity laboratory testing.   CBC with platelets differential    Narrative    The following orders were created for panel order CBC with platelets differential.  Procedure                               Abnormality         Status                     ---------                               -----------         ------                     CBC with platelets and d...[[id number]]  Abnormal            Final result                RBC and Platelet Morphology[[id number]]                      Final result                 Please view results for these tests on the individual orders.   Comprehensive metabolic panel   Result Value Ref Range    Sodium 139 135 - 145 mmol/L    Potassium 3.9 3.4 - 5.3 mmol/L    Carbon Dioxide (CO2) 19 (L) 22 - 29 mmol/L    Anion Gap 15 7 - 15 mmol/L    Urea Nitrogen 12.9 8.0 - 23.0 mg/dL    Creatinine 0.59 0.51 - 0.95 mg/dL    GFR Estimate >90 >60 mL/min/1.73m2    Calcium 9.0 8.8 - 10.2 mg/dL    Chloride 105 98 - 107 mmol/L    Glucose 129 (H) 70 - 99 mg/dL    Alkaline Phosphatase 96 40 - 150 U/L    AST 16 0 - 45 U/L    ALT 6 0 - 50 U/L    Protein Total 7.5 6.4 - 8.3 g/dL    Albumin 3.4 (L) 3.5 - 5.2 g/dL    Bilirubin Total 0.2 <=1.2 mg/dL   CRP inflammation   Result Value Ref Range    CRP Inflammation 178.48 (H) <5.00 mg/L   Erythrocyte sedimentation rate auto   Result Value Ref Range    Erythrocyte Sedimentation Rate 101 (H) 0 - 30 mm/hr   CBC with platelets and differential   Result Value Ref Range    WBC Count 8.5 4.0 - 11.0 10e3/uL    RBC Count 3.55 (L) 3.80 - 5.20 10e6/uL    Hemoglobin 9.4 (L) 11.7 - 15.7 g/dL    Hematocrit 30.0 (L) 35.0 - 47.0 %    MCV 85 78 - 100 fL    MCH 26.5 26.5 - 33.0 pg    MCHC 31.3 (L) 31.5 - 36.5 g/dL    RDW 17.8 (H) 10.0 - 15.0 %    Platelet Count 399 150 - 450 10e3/uL    % Neutrophils 78 %    % Lymphocytes 10 %    % Monocytes 8 %    % Eosinophils 3 %    % Basophils 1 %    % Immature Granulocytes 1 %    NRBCs per 100 WBC 0 <1 /100    Absolute Neutrophils 6.6 1.6 - 8.3 10e3/uL    Absolute Lymphocytes 0.9 0.8 - 5.3 10e3/uL    Absolute Monocytes 0.7 0.0 - 1.3 10e3/uL    Absolute Eosinophils 0.3 0.0 - 0.7 10e3/uL    Absolute Basophils 0.1 0.0 - 0.2 10e3/uL    Absolute Immature Granulocytes 0.0 <=0.4 10e3/uL    Absolute NRBCs 0.0 10e3/uL   RBC and Platelet Morphology   Result Value Ref Range    RBC Morphology Confirmed RBC Indices     Platelet Assessment  Automated Count  Confirmed. Platelet morphology is normal.     Automated Count Confirmed. Platelet morphology is normal.    Giant Platelets      Acanthocytes      Raghavendra Rods      Basophilic Stippling      Bite Cells      Blister Cells      [place] Cells      Elliptocytes      Hgb C Crystals      Martinez-Jolly Bodies      Hypersegmented Neutrophils      Polychromasia      RBC agglutination      RBC Fragments      Reactive Lymphocytes      Rouleaux      Sickle Cells      Smudge Cells      Spherocytes      Stomatocytes      Target Cells      Teardrop Cells      Toxic Neutrophils      Pathologist Review Comments (Blood)     [facility] Draw    Narrative    The following orders were created for panel order [facility] Draw.  Procedure                               Abnormality         Status                     ---------                               -----------         ------                     Extra Blue Top Tube[[id number]]                              Final result               Extra Red Top Tube[[id number]]                               Final result               Extra Green Top (Lithium...[[id number]]                      Final result                 Please view results for these tests on the individual orders.   Extra Blue Top Tube   Result Value Ref Range    Hold Specimen JIC    Extra Red Top Tube   Result Value Ref Range    Hold Specimen JIC    Extra Green Top (Lithium Heparin) Tube   Result Value Ref Range    Hold Specimen JIC        Medications   acetaminophen (TYLENOL) tablet 500 mg (500 mg Oral $Given 3/16/24 1637)   oxyCODONE-acetaminophen (PERCOCET) 5-325 MG per tablet 1 tablet (1 tablet Oral $Given 3/16/24 1637)   predniSONE (DELTASONE) tablet 20 mg (20 mg Oral $Given 3/16/24 1637)       Assessments & Plan (with Medical Decision Making)     I have reviewed the nursing notes.    I have reviewed the findings, diagnosis, plan and need for follow up with the patient.           Medical Decision Making  The patient's presentation was of  moderate complexity (a chronic illness mild to moderate exacerbation, progression, or side effect of treatment).    The patient's evaluation involved:  ordering and/or review of 3+ test(s) in this encounter (see separate area of note for details)    The patient's management necessitated moderate risk (prescription drug management including medications given in the ED).        New Prescriptions    OXYCODONE-ACETAMINOPHEN (PERCOCET) 5-325 MG TABLET    Take 1 tablet by mouth every 6 hours as needed for pain    PREDNISONE (DELTASONE) 10 MG TABLET    Two tablets daily for 2 days, then one tab for two days, the 1/2 tab daily for 2 days       Final diagnoses:   Rheumatoid arthritis involving multiple sites, unspecified whether rheumatoid factor present (H)   Unfortunately patient has not been compliant with recommendations including daily Celebrex, Tylenol.  She has been on methotrexate and Plaquenil in the past.  She has not been taking these.  She did not think the prednisone was beneficial but also admits that she had less pain while she was on the higher doses of steroid.  Unfortunately due to financial constraints some of her options for treatment are limited.  She did feel better with the Percocet.  Discussed my concerns with patient and her grandson.  Narcotics are not first-line and absolutely not recommended as a daily management for her chronic pain problem.  Encouraged to go back to rheumatology.  I did provide a prescription for her for 12 tablets of Percocet.  I also gave her a higher dose of prednisone to take over the next several days.  She will need to call her rheumatologist and her primary care doctor for an ongoing plan.  Unfortunately, I am not sure she will be compliant with that.    3/16/2024   [facility] AND [facility]       Bridgette Franz DO  03/16/24 5585

## 2024-03-16 NOTE — ED TRIAGE NOTES
"Patient presents with pain all over especially in her joints.  She states she has had this pain but it is worse today.  Patient denies fever, slight cough. Patient took her last Norco at 0500 this morning.  She got the RX on the 13 th when she saw her PCP.. Patient states she does have arthritis.    BP (!) 172/75   Pulse 111   Temp 98.1  F (36.7  C) (Tympanic)   Resp 18   Ht 1.448 m (4' 9\")   Wt 59 kg (130 lb)   LMP 07/22/1993   SpO2 94%   BMI 28.13 kg/m         Triage Assessment (Adult)       Row Name 03/16/24 2907          Triage Assessment    Airway WDL WDL        Respiratory WDL    Respiratory WDL WDL        Skin Circulation/Temperature WDL    Skin Circulation/Temperature WDL WDL        Cardiac WDL    Cardiac WDL WDL        Peripheral/Neurovascular WDL    Peripheral Neurovascular WDL WDL        Cognitive/Neuro/Behavioral WDL    Cognitive/Neuro/Behavioral WDL WDL                     "

## 2024-11-30 ENCOUNTER — HEALTH MAINTENANCE LETTER (OUTPATIENT)
Age: 79
End: 2024-11-30

## 2025-03-28 ENCOUNTER — HOSPITAL ENCOUNTER (EMERGENCY)
Facility: OTHER | Age: 80
Discharge: HOME | End: 2025-03-29
Attending: FAMILY MEDICINE
Payer: MEDICARE

## 2025-03-28 DIAGNOSIS — K29.00 ACUTE GASTRITIS WITHOUT HEMORRHAGE, UNSPECIFIED GASTRITIS TYPE: ICD-10-CM

## 2025-03-28 DIAGNOSIS — N39.0 ACUTE UTI: ICD-10-CM

## 2025-03-28 DIAGNOSIS — R10.13 EPIGASTRIC PAIN: ICD-10-CM

## 2025-03-28 PROCEDURE — 99284 EMERGENCY DEPT VISIT MOD MDM: CPT | Mod: 25 | Performed by: FAMILY MEDICINE

## 2025-03-28 PROCEDURE — 93010 ELECTROCARDIOGRAM REPORT: CPT | Performed by: INTERNAL MEDICINE

## 2025-03-28 PROCEDURE — 99284 EMERGENCY DEPT VISIT MOD MDM: CPT | Performed by: FAMILY MEDICINE

## 2025-03-28 PROCEDURE — 93005 ELECTROCARDIOGRAM TRACING: CPT | Performed by: FAMILY MEDICINE

## 2025-03-28 RX ORDER — KETOROLAC TROMETHAMINE 15 MG/ML
15 INJECTION, SOLUTION INTRAMUSCULAR; INTRAVENOUS ONCE
Status: COMPLETED | OUTPATIENT
Start: 2025-03-28 | End: 2025-03-29

## 2025-03-29 VITALS
RESPIRATION RATE: 15 BRPM | BODY MASS INDEX: 25.97 KG/M2 | WEIGHT: 120 LBS | TEMPERATURE: 98 F | OXYGEN SATURATION: 100 % | HEART RATE: 83 BPM | SYSTOLIC BLOOD PRESSURE: 136 MMHG | DIASTOLIC BLOOD PRESSURE: 63 MMHG

## 2025-03-29 LAB
ALBUMIN SERPL BCG-MCNC: 3.6 G/DL (ref 3.5–5.2)
ALBUMIN UR-MCNC: 20 MG/DL
ALP SERPL-CCNC: 87 U/L (ref 40–150)
ALT SERPL W P-5'-P-CCNC: 11 U/L (ref 0–50)
ANION GAP SERPL CALCULATED.3IONS-SCNC: 14 MMOL/L (ref 7–15)
APPEARANCE UR: CLEAR
AST SERPL W P-5'-P-CCNC: 14 U/L (ref 0–45)
BACTERIA #/AREA URNS HPF: ABNORMAL /HPF
BASOPHILS # BLD AUTO: 0 10E3/UL (ref 0–0.2)
BASOPHILS NFR BLD AUTO: 0 %
BILIRUB SERPL-MCNC: 0.2 MG/DL
BILIRUB UR QL STRIP: NEGATIVE
BUN SERPL-MCNC: 22.8 MG/DL (ref 8–23)
CALCIUM SERPL-MCNC: 8.3 MG/DL (ref 8.8–10.4)
CHLORIDE SERPL-SCNC: 102 MMOL/L (ref 98–107)
COLOR UR AUTO: ABNORMAL
CREAT SERPL-MCNC: 0.72 MG/DL (ref 0.51–0.95)
CRP SERPL-MCNC: 52.02 MG/L
EGFRCR SERPLBLD CKD-EPI 2021: 86 ML/MIN/1.73M2
EOSINOPHIL # BLD AUTO: 0.4 10E3/UL (ref 0–0.7)
EOSINOPHIL NFR BLD AUTO: 3 %
ERYTHROCYTE [DISTWIDTH] IN BLOOD BY AUTOMATED COUNT: 17.8 % (ref 10–15)
GLUCOSE SERPL-MCNC: 88 MG/DL (ref 70–99)
GLUCOSE UR STRIP-MCNC: NEGATIVE MG/DL
HCO3 SERPL-SCNC: 21 MMOL/L (ref 22–29)
HCT VFR BLD AUTO: 31.1 % (ref 35–47)
HGB BLD-MCNC: 9.9 G/DL (ref 11.7–15.7)
HGB UR QL STRIP: NEGATIVE
HOLD SPECIMEN: NORMAL
HOLD SPECIMEN: NORMAL
HYALINE CASTS: 3 /LPF
IMM GRANULOCYTES # BLD: 0.1 10E3/UL
IMM GRANULOCYTES NFR BLD: 1 %
KETONES UR STRIP-MCNC: NEGATIVE MG/DL
LEUKOCYTE ESTERASE UR QL STRIP: ABNORMAL
LIPASE SERPL-CCNC: 28 U/L (ref 13–60)
LYMPHOCYTES # BLD AUTO: 1.3 10E3/UL (ref 0.8–5.3)
LYMPHOCYTES NFR BLD AUTO: 11 %
MCH RBC QN AUTO: 26.8 PG (ref 26.5–33)
MCHC RBC AUTO-ENTMCNC: 31.8 G/DL (ref 31.5–36.5)
MCV RBC AUTO: 84 FL (ref 78–100)
MONOCYTES # BLD AUTO: 0.8 10E3/UL (ref 0–1.3)
MONOCYTES NFR BLD AUTO: 7 %
MUCOUS THREADS #/AREA URNS LPF: PRESENT /LPF
NEUTROPHILS # BLD AUTO: 9 10E3/UL (ref 1.6–8.3)
NEUTROPHILS NFR BLD AUTO: 78 %
NITRATE UR QL: POSITIVE
NRBC # BLD AUTO: 0 10E3/UL
NRBC BLD AUTO-RTO: 0 /100
PH UR STRIP: 6.5 [PH] (ref 5–9)
PLATELET # BLD AUTO: 371 10E3/UL (ref 150–450)
POTASSIUM SERPL-SCNC: 4.1 MMOL/L (ref 3.4–5.3)
PROT SERPL-MCNC: 7 G/DL (ref 6.4–8.3)
RBC # BLD AUTO: 3.69 10E6/UL (ref 3.8–5.2)
RBC URINE: 1 /HPF
SODIUM SERPL-SCNC: 137 MMOL/L (ref 135–145)
SP GR UR STRIP: 1.02 (ref 1–1.03)
TROPONIN T SERPL HS-MCNC: 14 NG/L
UROBILINOGEN UR STRIP-MCNC: NORMAL MG/DL
WBC # BLD AUTO: 11.6 10E3/UL (ref 4–11)
WBC URINE: 7 /HPF

## 2025-03-29 PROCEDURE — 80053 COMPREHEN METABOLIC PANEL: CPT | Performed by: FAMILY MEDICINE

## 2025-03-29 PROCEDURE — 36415 COLL VENOUS BLD VENIPUNCTURE: CPT | Performed by: FAMILY MEDICINE

## 2025-03-29 PROCEDURE — 250N000013 HC RX MED GY IP 250 OP 250 PS 637: Performed by: FAMILY MEDICINE

## 2025-03-29 PROCEDURE — 81001 URINALYSIS AUTO W/SCOPE: CPT | Performed by: FAMILY MEDICINE

## 2025-03-29 PROCEDURE — 87186 SC STD MICRODIL/AGAR DIL: CPT | Performed by: FAMILY MEDICINE

## 2025-03-29 PROCEDURE — 86140 C-REACTIVE PROTEIN: CPT | Performed by: FAMILY MEDICINE

## 2025-03-29 PROCEDURE — 250N000011 HC RX IP 250 OP 636: Mod: JZ | Performed by: FAMILY MEDICINE

## 2025-03-29 PROCEDURE — 96374 THER/PROPH/DIAG INJ IV PUSH: CPT | Performed by: FAMILY MEDICINE

## 2025-03-29 PROCEDURE — 85041 AUTOMATED RBC COUNT: CPT | Performed by: FAMILY MEDICINE

## 2025-03-29 PROCEDURE — 83690 ASSAY OF LIPASE: CPT | Performed by: FAMILY MEDICINE

## 2025-03-29 PROCEDURE — 84484 ASSAY OF TROPONIN QUANT: CPT | Performed by: FAMILY MEDICINE

## 2025-03-29 PROCEDURE — 85004 AUTOMATED DIFF WBC COUNT: CPT | Performed by: FAMILY MEDICINE

## 2025-03-29 PROCEDURE — 250N000009 HC RX 250: Performed by: FAMILY MEDICINE

## 2025-03-29 PROCEDURE — 87086 URINE CULTURE/COLONY COUNT: CPT | Performed by: FAMILY MEDICINE

## 2025-03-29 RX ORDER — MAGNESIUM HYDROXIDE/ALUMINUM HYDROXICE/SIMETHICONE 120; 1200; 1200 MG/30ML; MG/30ML; MG/30ML
15 SUSPENSION ORAL ONCE
Status: COMPLETED | OUTPATIENT
Start: 2025-03-29 | End: 2025-03-29

## 2025-03-29 RX ORDER — LIDOCAINE HYDROCHLORIDE 20 MG/ML
5 SOLUTION OROPHARYNGEAL ONCE
Status: COMPLETED | OUTPATIENT
Start: 2025-03-29 | End: 2025-03-29

## 2025-03-29 RX ORDER — CEFDINIR 300 MG/1
300 CAPSULE ORAL 2 TIMES DAILY
Qty: 10 CAPSULE | Refills: 0 | Status: SHIPPED | OUTPATIENT
Start: 2025-03-29 | End: 2025-04-03

## 2025-03-29 RX ADMIN — LIDOCAINE HYDROCHLORIDE 5 ML: 20 SOLUTION ORAL at 00:27

## 2025-03-29 RX ADMIN — KETOROLAC TROMETHAMINE 15 MG: 15 INJECTION, SOLUTION INTRAMUSCULAR; INTRAVENOUS at 00:34

## 2025-03-29 RX ADMIN — ALUMINUM HYDROXIDE, MAGNESIUM HYDROXIDE, AND SIMETHICONE 15 ML: 200; 200; 20 SUSPENSION ORAL at 00:27

## 2025-03-29 ASSESSMENT — ENCOUNTER SYMPTOMS
DECREASED CONCENTRATION: 0
FEVER: 0
DIFFICULTY URINATING: 0
ACTIVITY CHANGE: 1
APPETITE CHANGE: 1
LIGHT-HEADEDNESS: 0
WEAKNESS: 0
SHORTNESS OF BREATH: 0
ABDOMINAL PAIN: 1
CONFUSION: 0
HEADACHES: 0
CHILLS: 0
RHINORRHEA: 0
NAUSEA: 0
COUGH: 1
VOMITING: 0
BACK PAIN: 0
DIZZINESS: 0

## 2025-03-29 ASSESSMENT — ACTIVITIES OF DAILY LIVING (ADL)
ADLS_ACUITY_SCORE: 45
ADLS_ACUITY_SCORE: 45

## 2025-03-29 NOTE — ED PROVIDER NOTES
"  History     Chief Complaint   Patient presents with    Rib Pain     HPI  Rebekah Shirley is a 79 year old female who has a PMH significant for RA on Celebrex and methotrexate, non-occlusive CAD, mixed hyperlipidemia and GERD who presented to the ER with complaints of severe \"rib\" pain which increased in intensity a few hours PTA.  She has had intermittent pain for the past week but much worse this evening.  No known aggravating or alleviating factors.  Has not tried medications but has no change with sitting up, lying down or on her sides or with a heating pad.  Has decreased appetite in general and does not eat when she has the pain.  Denies similar symptoms previously.  No nausea, vomiting, diarrhea, fever, chills.  Sometimes feels temporary relief when she makes herself vomit.    Allergies:  No Known Allergies    Problem List:    Patient Active Problem List    Diagnosis Date Noted    Rheumatoid arthritis involving multiple sites with positive rheumatoid factor (H) 02/23/2021     Priority: Medium     Per [facility]      Peripheral tear of medial meniscus of right knee as current injury 02/03/2021     Priority: Medium    COPD (chronic obstructive pulmonary disease) (H) 01/12/2021     Priority: Medium    Gastroesophageal reflux disease with esophagitis without hemorrhage 12/22/2020     Priority: Medium    Coronary artery calcification seen on CAT scan 12/22/2020     Priority: Medium    Non-occlusive coronary artery disease based on a cardiac catheterization through [facility] in June, 2019 06/25/2019     Priority: Medium    Hx of cardiac catheterization June, 2019 through [facility] without stenting 06/25/2019     Priority: Medium    Aortic atherosclerosis 05/09/2019     Priority: Medium    Elevated coronary artery calcium score on 4/22/2019 at 606.7  05/08/2019     Priority: Medium    History of tobacco abuse  05/08/2019     Priority: Medium    Mixed hyperlipidemia 01/23/2018     Priority: Medium    " Rheumatoid arthritis (H) 2013     Priority: Medium    Anxiety state 2013     Priority: Medium        Past Medical History:    Past Medical History:   Diagnosis Date    Hyperlipidemia     Nicotine dependence, uncomplicated     Personal history of other medical treatment (CODE)     Rheumatoid arthritis (H)        Past Surgical History:    Past Surgical History:   Procedure Laterality Date    APPENDECTOMY OPEN      Incidental appendectomy at time of adhesion lysis.    COLONOSCOPY      06,Colonoscopy (screening), normal, Dr. Orellana    LAPAROSCOPIC TUBAL LIGATION          LAPAROTOMY EXPLORATORY      History of partial small bowel obstruction necessitating exploratory laparotomy.  Small area of adhesions with secondary lysis felt responsible for the partial obstruction.       Family History:    Family History   Problem Relation Age of Onset    Heart Disease Father 58        MI    Arthritis Mother     Hyperlipidemia Mother     Other - See Comments Sister         Occupational lung disease    Other - See Comments Brother         Laryngeal cancer    Heart Disease Sister         Surgery for heart       Social History:  Marital Status:  Single [1]  Social History     Tobacco Use    Smoking status: Former     Current packs/day: 0.00     Average packs/day: 0.3 packs/day for 15.0 years (3.8 ttl pk-yrs)     Types: Cigarettes     Start date: 1993     Quit date: 2008     Years since quittin.4    Smokeless tobacco: Former     Quit date: 2/15/2008   Vaping Use    Vaping status: Never Used   Substance Use Topics    Alcohol use: No    Drug use: No        Medications:    amLODIPine (NORVASC) 5 MG tablet  aspirin (ASA) 81 MG chewable tablet  celecoxib (CELEBREX) 100 MG capsule  diclofenac (VOLTAREN) 1 % topical gel  folic acid (FOLVITE) 1 MG tablet  methotrexate 2.5 MG tablet CHEMO  nitroGLYcerin (NITROSTAT) 0.4 MG sublingual tablet  pantoprazole (PROTONIX) 20 MG EC tablet  rosuvastatin (CRESTOR) 20  MG tablet  sulfaSALAzine (AZULFIDINE) 500 MG tablet          Review of Systems   Constitutional:  Positive for activity change and appetite change. Negative for chills and fever.   HENT:  Negative for congestion and rhinorrhea.    Respiratory:  Positive for cough. Negative for shortness of breath.         Had a cough for a few weeks but it improved before the pain started.   Cardiovascular:  Positive for chest pain.   Gastrointestinal:  Positive for abdominal pain. Negative for nausea and vomiting.   Genitourinary:  Negative for difficulty urinating.   Musculoskeletal:  Negative for back pain.   Neurological:  Negative for dizziness, weakness, light-headedness and headaches.   Psychiatric/Behavioral:  Negative for confusion and decreased concentration.        Physical Exam   BP: 139/77  Pulse: 85  Temp: 98  F (36.7  C)  Resp: 18  Weight: 54.4 kg (120 lb)  SpO2: 100 %      Physical Exam  Vitals and nursing note reviewed.   Constitutional:       General: She is in acute distress.      Appearance: She is normal weight. She is not ill-appearing.      Comments: Laying on ER cart with HOB elevated 15 degrees.  Holding her hands over her epigastric region and has facial grimace.  Able to sit up and lay down with controlled movements and no facial grimace.   HENT:      Head: Normocephalic and atraumatic.      Nose: No rhinorrhea.      Mouth/Throat:      Mouth: Mucous membranes are moist.   Eyes:      Extraocular Movements: Extraocular movements intact.      Conjunctiva/sclera: Conjunctivae normal.   Cardiovascular:      Rate and Rhythm: Normal rate and regular rhythm.   Pulmonary:      Effort: No respiratory distress.      Breath sounds: No wheezing, rhonchi or rales.      Comments: Decreased breath sounds throughout.  Abdominal:      General: Abdomen is flat. Bowel sounds are normal.      Palpations: Abdomen is soft.      Tenderness: There is abdominal tenderness.      Comments: Tender in the epigastric region without  guarding or rebound.   Musculoskeletal:      Cervical back: Neck supple.      Right lower leg: Edema present.      Left lower leg: Edema present.      Comments: 1+ edema bilateral lower legs/ankles   Skin:     General: Skin is warm and dry.   Neurological:      General: No focal deficit present.      Mental Status: She is alert and oriented to person, place, and time.      Cranial Nerves: No cranial nerve deficit.      Motor: No weakness.   Psychiatric:         Behavior: Behavior normal.         Judgment: Judgment normal.      Comments: Facial grimace and appears uncomfortable throughout the visit.         ED Course     ED Course as of 03/29/25 0036   Sat Mar 29, 2025   0033 Feeling a little better.  No IV in yet.  Give GI cocktail while waiting for IV to be placed.  Will sign out to Dr. Barr at change of shift pending lab results.     Procedures              EKG Interpretation:      Interpreted by Monik Cunha MD  Time reviewed: 2345  Symptoms at time of EKG: Epigastric abdominal pain   Rhythm: normal sinus   Rate: Normal  Axis: Left Axis Deviation  Ectopy: none  Conduction: normal  ST Segments/ T Waves: No ST-T wave changes  Q Waves: none  Comparison to prior: Unchanged from 12/22/2020    Clinical Impression: normal EKG            Critical Care time:  none     None         Results for orders placed or performed during the hospital encounter of 03/28/25 (from the past 24 hours)   CBC with platelets differential    Narrative    The following orders were created for panel order CBC with platelets differential.  Procedure                               Abnormality         Status                     ---------                               -----------         ------                     CBC with platelets and ...[[id number]]                      In process                   Please view results for these tests on the individual orders.   Extra Tube    Narrative    The following orders were created for panel order Extra  Tube.  Procedure                               Abnormality         Status                     ---------                               -----------         ------                     Extra Blue Top Tube[[id number]]                             In process                 Extra Red Top Tube[[id number]]                              In process                   Please view results for these tests on the individual orders.       Medications   ketorolac (TORADOL) injection 15 mg (15 mg Intravenous $Given 3/29/25 0034)   alum & mag hydroxide-simethicone (MAALOX) suspension 15 mL (15 mLs Oral $Given 3/29/25 0027)   lidocaine (viscous) (XYLOCAINE) 2 % solution 5 mL (5 mLs Mouth/Throat $Given 3/29/25 0027)       Assessments & Plan (with Medical Decision Making)     I have reviewed the nursing notes.    I have reviewed the findings, diagnosis, plan and need for follow up with the patient.           Medical Decision Making  The patient's presentation was of moderate complexity (an undiagnosed new problem with uncertain prognosis).    The patient's evaluation involved:  review of 1 test result(s) ordered prior to this encounter (see separate area of note for details)  ordering and/or review of 3+ test(s) in this encounter (see separate area of note for details)    The patient's management necessitated moderate risk (prescription drug management including medications given in the ED).        New Prescriptions    No medications on file       Final diagnoses:   Epigastric pain       3/28/2025   [facility]       Monik Cunha MD  03/29/25 0036

## 2025-03-29 NOTE — DISCHARGE INSTRUCTIONS
Follow-up with your primary care doctor the next 2 to 3 days.    Take cefdinir for the next 5 days, this is the antibiotic.    Take Pepcid 2 times a day for the next 2 weeks.    Take omeprazole every day for the next 2 to 3 months.    Continue to use Tums, Rolaids, Maalox as needed for breakthrough pain.    Avoid ibuprofen, aspirin, Aleve, naproxen.    For mild to moderate pain or fever you can take ibuprofen and/or Tylenol if you do not have allergies to these.    You have any worsening or concerning symptoms please call 911 or return to the emergency department immediately.

## 2025-03-29 NOTE — ED PROVIDER NOTES
Brief ED transfer of care note    Patient presented to the emergency department requiring further cares then was able to be completed.  Patient was signed out to me by Guerline PERES.    The workup was pending: laboratory work up    ED Course as of 03/29/25 0153   Sat Mar 29, 2025   0033 Feeling a little better.  No IV in yet.  Give GI cocktail while waiting for IV to be placed.  Will sign out to Dr. Barr at change of shift pending lab results.   0039 I also reviewed the EKG which revealed sinus rhythm, no signs of acute ischemia.  Ventricular rate is 76.  This was interpreted by me.   0040 WBC(!): 11.6   0040 Hemoglobin(!): 9.9  Stable    0107 Troponin T, High Sensitivity: 14   0119 CRP Inflammation(!): 52.02   0119 Lipase: 28   0119 Sodium: 137   0119 Potassium: 4.1   0119 Carbon Dioxide (CO2)(!): 21   0119 Creatinine: 0.72   0119 ALT: 11   0119 AST: 14   0119 Alkaline Phosphatase: 87   0119 Bilirubin Total: 0.2   0143 Nitrite Urine(!): Positive   0143 WBC Urine(!): 7         /77   Pulse 85   Temp 98  F (36.7  C) (Oral)   Resp 18   Wt 54.4 kg (120 lb)   LMP 07/22/1993   SpO2 100%   BMI 25.97 kg/m      The patient  79-year-old female with a past medical history of RA on methotrexate, CAD, GERD who presents with rib pain.  Patient describes it as severe,.  Increased in intensity last few hours intermittent for last week.    Patient had significant proved and after GI cocktail.  Troponin 14, symptoms do not sound cardiac, my concern for acute coronary syndrome is low.  White blood cell count 11.6.  CRP is significant elevated at 52.02 however given her history of rheumatoid arthritis, this is actually lower  than previous levels.  Urinalysis returned positive for nitrites, leukocyte esterase and bacteria, but to count of 7.  She denies any dysuria polyuria, flank pain or back pain.  However given the nitrates that are positive, cultures pending and patient will be treated with cefdinir for 5  days.    Ultimately given her resolution of pain, not compelled to order CT scan at this time.  For symptoms worsen, this certainly might be cause for repeat imaging and laboratory results.  I recommended Pepcid, and omeprazole.  Use Tums and Maalox as needed for breakthrough pain.  Strict return precautions.  She has no melena, or bloody stools.  Vital signs stable, clinical appearance is stable, strict return precautions given.    Diagnosis  Gastritis, epigastric Don abdominal osito pain    Antolin Brar MD  Emergency Medicine     Cortney, Manpreet NASH MD  03/29/25 0154

## 2025-03-29 NOTE — ED TRIAGE NOTES
Patient arrives today with c/o intermittent rib pain over the last week. States that it comes and goes but it has been ongoing for the last few hours and nothing makes it better. States that she has not had any recent trauma to the area and has not overused any muscles in that area recently. When asked where specifically the pain is she states that it radiates across her ribs, towards her sternum and to the other side of her chest just underneath her breasts.      Triage Assessment (Adult)       Row Name 03/28/25 6439          Triage Assessment    Airway WDL WDL        Respiratory WDL    Respiratory WDL WDL        Skin Circulation/Temperature WDL    Skin Circulation/Temperature WDL WDL        Cardiac WDL    Cardiac WDL WDL        Peripheral/Neurovascular WDL    Peripheral Neurovascular WDL WDL        Cognitive/Neuro/Behavioral WDL    Cognitive/Neuro/Behavioral WDL WDL

## 2025-03-31 ENCOUNTER — TELEPHONE (OUTPATIENT)
Dept: NURSING | Facility: CLINIC | Age: 80
End: 2025-03-31
Payer: COMMERCIAL

## 2025-03-31 LAB
ATRIAL RATE - MUSE: 76 BPM
BACTERIA UR CULT: ABNORMAL
DIASTOLIC BLOOD PRESSURE - MUSE: NORMAL MMHG
INTERPRETATION ECG - MUSE: NORMAL
P AXIS - MUSE: 5 DEGREES
PR INTERVAL - MUSE: 132 MS
QRS DURATION - MUSE: 68 MS
QT - MUSE: 388 MS
QTC - MUSE: 436 MS
R AXIS - MUSE: -3 DEGREES
SYSTOLIC BLOOD PRESSURE - MUSE: NORMAL MMHG
T AXIS - MUSE: 27 DEGREES
VENTRICULAR RATE- MUSE: 76 BPM

## 2025-03-31 NOTE — TELEPHONE ENCOUNTER
"[facility] [place]    Reason for call: Lab Result Notification     Lab Result (including Rx patient on, if applicable).  If culture, copy of lab report at bottom.  Lab Result: Urine Culture - See Below    ED Rx: cefdinir (OMNICEF) 300 MG capsule - Take 1 capsule (300 mg) by mouth 2 times daily for 5 days (SUSCEPTIBLE)    Creatinine Level (mg/dl)   Creatinine   Date Value Ref Range Status   03/29/2025 0.72 0.51 - 0.95 mg/dL Final   12/15/2020 0.68 0.60 - 1.20 mg/dL Final    Creatinine clearance (ml/min), if applicable    Creatinine clearance cannot be calculated (Unknown ideal weight.)     ED Symptoms: Presented to the ED with severe \"rib\" pain.     Current Symptoms: States the pain is better but still has a little discomfort.  States she feels 99.9% better.  Denies any new or worsening symptoms.     RN Recommendations/Instructions per [place] ED lab result protocol:   [facility] ED lab result protocol utilized: Urine Culture  Continue antibiotic/medication as prescribed: cefdinir    Patient/care giver notified to contact your PCP clinic or return to the Emergency department if your:  Symptoms do not improve after 3 days on antibiotic.  Symptoms do not resolve after completing antibiotic.  Symptoms worsen or other concerning symptoms.       JETT DSOUZA RN  "

## 2025-04-14 ENCOUNTER — OFFICE VISIT (OUTPATIENT)
Dept: FAMILY MEDICINE | Facility: OTHER | Age: 80
End: 2025-04-14
Attending: FAMILY MEDICINE
Payer: MEDICARE

## 2025-04-14 VITALS
RESPIRATION RATE: 20 BRPM | TEMPERATURE: 98.9 F | HEART RATE: 94 BPM | OXYGEN SATURATION: 96 % | WEIGHT: 125 LBS | SYSTOLIC BLOOD PRESSURE: 128 MMHG | DIASTOLIC BLOOD PRESSURE: 60 MMHG | BODY MASS INDEX: 27.05 KG/M2

## 2025-04-14 DIAGNOSIS — D64.9 ANEMIA, UNSPECIFIED TYPE: Primary | ICD-10-CM

## 2025-04-14 DIAGNOSIS — K21.00 GASTROESOPHAGEAL REFLUX DISEASE WITH ESOPHAGITIS WITHOUT HEMORRHAGE: ICD-10-CM

## 2025-04-14 PROBLEM — J44.9 COPD (CHRONIC OBSTRUCTIVE PULMONARY DISEASE) (H): Status: RESOLVED | Noted: 2021-01-12 | Resolved: 2025-04-14

## 2025-04-14 LAB
BASOPHILS # BLD AUTO: 0 10E3/UL (ref 0–0.2)
BASOPHILS NFR BLD AUTO: 0 %
EOSINOPHIL # BLD AUTO: 0.3 10E3/UL (ref 0–0.7)
EOSINOPHIL NFR BLD AUTO: 2 %
ERYTHROCYTE [DISTWIDTH] IN BLOOD BY AUTOMATED COUNT: 17 % (ref 10–15)
HCT VFR BLD AUTO: 30.2 % (ref 35–47)
HGB BLD-MCNC: 9.4 G/DL (ref 11.7–15.7)
IMM GRANULOCYTES # BLD: 0 10E3/UL
IMM GRANULOCYTES NFR BLD: 0 %
IRON BINDING CAPACITY (ROCHE): 242 UG/DL (ref 240–430)
IRON SATN MFR SERPL: 7 % (ref 15–46)
IRON SERPL-MCNC: 18 UG/DL (ref 37–145)
LYMPHOCYTES # BLD AUTO: 1.7 10E3/UL (ref 0.8–5.3)
LYMPHOCYTES NFR BLD AUTO: 15 %
MCH RBC QN AUTO: 26 PG (ref 26.5–33)
MCHC RBC AUTO-ENTMCNC: 31.1 G/DL (ref 31.5–36.5)
MCV RBC AUTO: 83 FL (ref 78–100)
MONOCYTES # BLD AUTO: 0.9 10E3/UL (ref 0–1.3)
MONOCYTES NFR BLD AUTO: 8 %
NEUTROPHILS # BLD AUTO: 8.6 10E3/UL (ref 1.6–8.3)
NEUTROPHILS NFR BLD AUTO: 75 %
NRBC # BLD AUTO: 0 10E3/UL
NRBC BLD AUTO-RTO: 0 /100
PLATELET # BLD AUTO: 379 10E3/UL (ref 150–450)
RBC # BLD AUTO: 3.62 10E6/UL (ref 3.8–5.2)
WBC # BLD AUTO: 11.5 10E3/UL (ref 4–11)

## 2025-04-14 PROCEDURE — G0463 HOSPITAL OUTPT CLINIC VISIT: HCPCS

## 2025-04-14 PROCEDURE — 85004 AUTOMATED DIFF WBC COUNT: CPT | Mod: ZL | Performed by: FAMILY MEDICINE

## 2025-04-14 PROCEDURE — 3074F SYST BP LT 130 MM HG: CPT | Performed by: FAMILY MEDICINE

## 2025-04-14 PROCEDURE — G2211 COMPLEX E/M VISIT ADD ON: HCPCS | Performed by: FAMILY MEDICINE

## 2025-04-14 PROCEDURE — 1126F AMNT PAIN NOTED NONE PRSNT: CPT | Performed by: FAMILY MEDICINE

## 2025-04-14 PROCEDURE — 85048 AUTOMATED LEUKOCYTE COUNT: CPT | Mod: ZL | Performed by: FAMILY MEDICINE

## 2025-04-14 PROCEDURE — 99214 OFFICE O/P EST MOD 30 MIN: CPT | Performed by: FAMILY MEDICINE

## 2025-04-14 PROCEDURE — 36415 COLL VENOUS BLD VENIPUNCTURE: CPT | Mod: ZL | Performed by: FAMILY MEDICINE

## 2025-04-14 PROCEDURE — 3078F DIAST BP <80 MM HG: CPT | Performed by: FAMILY MEDICINE

## 2025-04-14 PROCEDURE — 83550 IRON BINDING TEST: CPT | Mod: ZL | Performed by: FAMILY MEDICINE

## 2025-04-14 PROCEDURE — 85041 AUTOMATED RBC COUNT: CPT | Mod: ZL | Performed by: FAMILY MEDICINE

## 2025-04-14 RX ORDER — HYDROXYCHLOROQUINE SULFATE 200 MG/1
TABLET, FILM COATED ORAL
COMMUNITY
Start: 2024-12-12

## 2025-04-14 RX ORDER — ACETAMINOPHEN 160 MG
1 TABLET,DISINTEGRATING ORAL DAILY
COMMUNITY

## 2025-04-14 ASSESSMENT — PAIN SCALES - GENERAL: PAINLEVEL_OUTOF10: NO PAIN (0)

## 2025-04-14 NOTE — PROGRESS NOTES
SUBJECTIVE:   Rebekah Shirley is a 79 year old female who presents to clinic today for the following health issues:    HPI  Patient arrives here for follow-up epigastric pain.  Was recently seen in the ER for epigastric.  This felt to be due to GI in origin.  Started on a PPI.  Was given a GI cocktail with some improvement.  Patient reports 1 episode of pain also reports her appetite is improving she was found to be anemic during her ER visit.  No changes in bowel or bladder habits.  She does have an elevated CRP felt to be due to rheumatoid arthritis    Patient Active Problem List    Diagnosis Date Noted    Rheumatoid arthritis involving multiple sites with positive rheumatoid factor (H) 2021     Priority: Medium     Per [facility]      Peripheral tear of medial meniscus of right knee as current injury 2021     Priority: Medium    Gastroesophageal reflux disease with esophagitis without hemorrhage 2020     Priority: Medium    Coronary artery calcification seen on CAT scan 2020     Priority: Medium    Non-occlusive coronary artery disease based on a cardiac catheterization through [facility] in 2019     Priority: Medium    Hx of cardiac catheterization  through [facility] without stenting 2019     Priority: Medium    Aortic atherosclerosis 2019     Priority: Medium    Elevated coronary artery calcium score on 2019 at 606.7  2019     Priority: Medium    History of tobacco abuse  2019     Priority: Medium    Mixed hyperlipidemia 2018     Priority: Medium    Rheumatoid arthritis (H) 2013     Priority: Medium    Anxiety state 2013     Priority: Medium     Past Medical History:   Diagnosis Date    Hyperlipidemia     No Comments Provided    Nicotine dependence, uncomplicated     No Comments Provided    Personal history of other medical treatment (CODE)      4, para 3.    Rheumatoid arthritis (H)       Past  Surgical History:   Procedure Laterality Date    APPENDECTOMY OPEN  1993    Incidental appendectomy at time of adhesion lysis.    COLONOSCOPY      7/19/06,Colonoscopy (screening), normal, Dr. Orellana    LAPAROSCOPIC TUBAL LIGATION      1982    LAPAROTOMY EXPLORATORY      History of partial small bowel obstruction necessitating exploratory laparotomy.  Small area of adhesions with secondary lysis felt responsible for the partial obstruction.       Review of Systems     OBJECTIVE:     /60 (BP Location: Right arm)   Pulse 94   Temp 98.9  F (37.2  C)   Resp 20   Wt 56.7 kg (125 lb)   LMP 07/22/1993   SpO2 96%   BMI 27.05 kg/m    Body mass index is 27.05 kg/m .  Physical Exam    Diagnostic Test Results:  Results for orders placed or performed in visit on 04/14/25 (from the past 24 hours)   Iron & Iron Binding Capacity   Result Value Ref Range    Iron 18 (L) 37 - 145 ug/dL    Iron Binding Capacity 242 240 - 430 ug/dL    Iron Sat Index 7 (L) 15 - 46 %   CBC and Differential    Narrative    The following orders were created for panel order CBC and Differential.  Procedure                               Abnormality         Status                     ---------                               -----------         ------                     CBC with platelets and ...[[id number]]  Abnormal            Final result                 Please view results for these tests on the individual orders.   CBC with platelets and differential   Result Value Ref Range    WBC Count 11.5 (H) 4.0 - 11.0 10e3/uL    RBC Count 3.62 (L) 3.80 - 5.20 10e6/uL    Hemoglobin 9.4 (L) 11.7 - 15.7 g/dL    Hematocrit 30.2 (L) 35.0 - 47.0 %    MCV 83 78 - 100 fL    MCH 26.0 (L) 26.5 - 33.0 pg    MCHC 31.1 (L) 31.5 - 36.5 g/dL    RDW 17.0 (H) 10.0 - 15.0 %    Platelet Count 379 150 - 450 10e3/uL    % Neutrophils 75 %    % Lymphocytes 15 %    % Monocytes 8 %    % Eosinophils 2 %    % Basophils 0 %    % Immature Granulocytes 0 %    NRBCs per 100 WBC 0 <1  /100    Absolute Neutrophils 8.6 (H) 1.6 - 8.3 10e3/uL    Absolute Lymphocytes 1.7 0.8 - 5.3 10e3/uL    Absolute Monocytes 0.9 0.0 - 1.3 10e3/uL    Absolute Eosinophils 0.3 0.0 - 0.7 10e3/uL    Absolute Basophils 0.0 0.0 - 0.2 10e3/uL    Absolute Immature Granulocytes 0.0 <=0.4 10e3/uL    Absolute NRBCs 0.0 10e3/uL       ASSESSMENT/PLAN:         (D64.9) Anemia, unspecified type  (primary encounter diagnosis)  Comment anemia stable.  Possible anemia of chronic disease.  Was discussed with patient  Plan: Iron & Iron Binding Capacity, CBC and         Differential            (K21.00) Gastroesophageal reflux disease with esophagitis without hemorrhage  Comment: Currently under better control.  If symptoms should recur consider imaging study.  Plan: Appetite is improving.    The longitudinal plan of care for the diagnosis(es)/condition(s) as documented were addressed during this visit. Due to the added complexity in care, I will continue to support Rebekah in the subsequent management and with ongoing continuity of care.    Vincent Fuller MD  [facility] [place]  Answers submitted by the patient for this visit:  Patient Health Questionnaire (Submitted on 4/14/2025)  PHQ9 TOTAL SCORE: Incomplete

## 2025-04-14 NOTE — NURSING NOTE
Patient here for ER follow up for visit on 03/28/2025 for epigastric pain. She denies any pain today. Medication Reconciliation: complete.    Sharon Perera LPN  4/14/2025 10:46 AM

## 2025-06-05 ENCOUNTER — APPOINTMENT (OUTPATIENT)
Dept: CT IMAGING | Facility: OTHER | Age: 80
DRG: 390 | End: 2025-06-05
Payer: MEDICARE

## 2025-06-05 ENCOUNTER — OFFICE VISIT (OUTPATIENT)
Dept: FAMILY MEDICINE | Facility: OTHER | Age: 80
End: 2025-06-05
Attending: FAMILY MEDICINE
Payer: MEDICARE

## 2025-06-05 ENCOUNTER — HOSPITAL ENCOUNTER (INPATIENT)
Facility: OTHER | Age: 80
End: 2025-06-05
Admitting: STUDENT IN AN ORGANIZED HEALTH CARE EDUCATION/TRAINING PROGRAM
Payer: MEDICARE

## 2025-06-05 ENCOUNTER — APPOINTMENT (OUTPATIENT)
Dept: GENERAL RADIOLOGY | Facility: OTHER | Age: 80
DRG: 390 | End: 2025-06-05
Attending: STUDENT IN AN ORGANIZED HEALTH CARE EDUCATION/TRAINING PROGRAM
Payer: MEDICARE

## 2025-06-05 VITALS
TEMPERATURE: 100.4 F | WEIGHT: 120 LBS | OXYGEN SATURATION: 95 % | DIASTOLIC BLOOD PRESSURE: 52 MMHG | RESPIRATION RATE: 10 BRPM | HEIGHT: 58 IN | SYSTOLIC BLOOD PRESSURE: 112 MMHG | HEART RATE: 98 BPM | BODY MASS INDEX: 25.19 KG/M2

## 2025-06-05 VITALS
RESPIRATION RATE: 24 BRPM | SYSTOLIC BLOOD PRESSURE: 100 MMHG | DIASTOLIC BLOOD PRESSURE: 64 MMHG | TEMPERATURE: 97.3 F | OXYGEN SATURATION: 100 % | HEART RATE: 118 BPM

## 2025-06-05 DIAGNOSIS — R10.9 ABDOMINAL PAIN, UNSPECIFIED ABDOMINAL LOCATION: Primary | ICD-10-CM

## 2025-06-05 DIAGNOSIS — R10.84 GENERALIZED ABDOMINAL PAIN: Primary | ICD-10-CM

## 2025-06-05 DIAGNOSIS — K56.609 SBO (SMALL BOWEL OBSTRUCTION) (H): ICD-10-CM

## 2025-06-05 DIAGNOSIS — R10.9 ABDOMINAL PAIN: ICD-10-CM

## 2025-06-05 PROBLEM — I25.10 NON-OCCLUSIVE CORONARY ARTERY DISEASE: Status: ACTIVE | Noted: 2019-06-25

## 2025-06-05 LAB
ALBUMIN SERPL BCG-MCNC: 3.6 G/DL (ref 3.5–5.2)
ALBUMIN UR-MCNC: NEGATIVE MG/DL
ALP SERPL-CCNC: 95 U/L (ref 40–150)
ALT SERPL W P-5'-P-CCNC: 10 U/L (ref 0–50)
ANION GAP SERPL CALCULATED.3IONS-SCNC: 15 MMOL/L (ref 7–15)
APPEARANCE UR: CLEAR
AST SERPL W P-5'-P-CCNC: 30 U/L (ref 0–45)
BACTERIA #/AREA URNS HPF: ABNORMAL /HPF
BASOPHILS # BLD AUTO: 0 10E3/UL (ref 0–0.2)
BASOPHILS NFR BLD AUTO: 0 %
BILIRUB SERPL-MCNC: 0.3 MG/DL
BILIRUB UR QL STRIP: NEGATIVE
BUN SERPL-MCNC: 17.5 MG/DL (ref 8–23)
CALCIUM SERPL-MCNC: 8.2 MG/DL (ref 8.8–10.4)
CHLORIDE SERPL-SCNC: 101 MMOL/L (ref 98–107)
COLOR UR AUTO: ABNORMAL
CREAT SERPL-MCNC: 0.68 MG/DL (ref 0.51–0.95)
CRP SERPL-MCNC: 41.38 MG/L
EGFRCR SERPLBLD CKD-EPI 2021: 89 ML/MIN/1.73M2
EOSINOPHIL # BLD AUTO: 0.1 10E3/UL (ref 0–0.7)
EOSINOPHIL NFR BLD AUTO: 1 %
ERYTHROCYTE [DISTWIDTH] IN BLOOD BY AUTOMATED COUNT: 17.3 % (ref 10–15)
GLUCOSE SERPL-MCNC: 102 MG/DL (ref 70–99)
GLUCOSE UR STRIP-MCNC: NEGATIVE MG/DL
HCO3 SERPL-SCNC: 21 MMOL/L (ref 22–29)
HCT VFR BLD AUTO: 35 % (ref 35–47)
HGB BLD-MCNC: 10.8 G/DL (ref 11.7–15.7)
HGB UR QL STRIP: NEGATIVE
HOLD SPECIMEN: NORMAL
IMM GRANULOCYTES # BLD: 0.1 10E3/UL
IMM GRANULOCYTES NFR BLD: 1 %
KETONES UR STRIP-MCNC: NEGATIVE MG/DL
LACTATE SERPL-SCNC: 1.7 MMOL/L (ref 0.7–2)
LEUKOCYTE ESTERASE UR QL STRIP: NEGATIVE
LIPASE SERPL-CCNC: 20 U/L (ref 13–60)
LYMPHOCYTES # BLD AUTO: 1.3 10E3/UL (ref 0.8–5.3)
LYMPHOCYTES NFR BLD AUTO: 9 %
MCH RBC QN AUTO: 24.5 PG (ref 26.5–33)
MCHC RBC AUTO-ENTMCNC: 30.9 G/DL (ref 31.5–36.5)
MCV RBC AUTO: 80 FL (ref 78–100)
MONOCYTES # BLD AUTO: 1 10E3/UL (ref 0–1.3)
MONOCYTES NFR BLD AUTO: 6 %
NEUTROPHILS # BLD AUTO: 12.5 10E3/UL (ref 1.6–8.3)
NEUTROPHILS NFR BLD AUTO: 83 %
NITRATE UR QL: POSITIVE
NRBC # BLD AUTO: 0 10E3/UL
NRBC BLD AUTO-RTO: 0 /100
PH UR STRIP: 7.5 [PH] (ref 5–9)
PLATELET # BLD AUTO: 439 10E3/UL (ref 150–450)
POTASSIUM SERPL-SCNC: 4.8 MMOL/L (ref 3.4–5.3)
PROCALCITONIN SERPL IA-MCNC: 0.07 NG/ML
PROT SERPL-MCNC: 7.8 G/DL (ref 6.4–8.3)
RBC # BLD AUTO: 4.4 10E6/UL (ref 3.8–5.2)
RBC URINE: 5 /HPF
SODIUM SERPL-SCNC: 137 MMOL/L (ref 135–145)
SP GR UR STRIP: 1.01 (ref 1–1.03)
TROPONIN T SERPL HS-MCNC: 9 NG/L
TROPONIN T SERPL HS-MCNC: 9 NG/L
UROBILINOGEN UR STRIP-MCNC: NORMAL MG/DL
WBC # BLD AUTO: 15 10E3/UL (ref 4–11)
WBC URINE: 1 /HPF

## 2025-06-05 PROCEDURE — 250N000009 HC RX 250

## 2025-06-05 PROCEDURE — 250N000011 HC RX IP 250 OP 636

## 2025-06-05 PROCEDURE — 86140 C-REACTIVE PROTEIN: CPT

## 2025-06-05 PROCEDURE — 84145 PROCALCITONIN (PCT): CPT | Performed by: STUDENT IN AN ORGANIZED HEALTH CARE EDUCATION/TRAINING PROGRAM

## 2025-06-05 PROCEDURE — 99285 EMERGENCY DEPT VISIT HI MDM: CPT | Mod: 25

## 2025-06-05 PROCEDURE — 1125F AMNT PAIN NOTED PAIN PRSNT: CPT | Performed by: FAMILY MEDICINE

## 2025-06-05 PROCEDURE — 87086 URINE CULTURE/COLONY COUNT: CPT

## 2025-06-05 PROCEDURE — 84132 ASSAY OF SERUM POTASSIUM: CPT

## 2025-06-05 PROCEDURE — 74018 RADEX ABDOMEN 1 VIEW: CPT

## 2025-06-05 PROCEDURE — 96376 TX/PRO/DX INJ SAME DRUG ADON: CPT

## 2025-06-05 PROCEDURE — G0463 HOSPITAL OUTPT CLINIC VISIT: HCPCS

## 2025-06-05 PROCEDURE — 83690 ASSAY OF LIPASE: CPT

## 2025-06-05 PROCEDURE — 96374 THER/PROPH/DIAG INJ IV PUSH: CPT | Mod: XU

## 2025-06-05 PROCEDURE — 258N000003 HC RX IP 258 OP 636

## 2025-06-05 PROCEDURE — 96375 TX/PRO/DX INJ NEW DRUG ADDON: CPT

## 2025-06-05 PROCEDURE — 74177 CT ABD & PELVIS W/CONTRAST: CPT | Mod: 26 | Performed by: RADIOLOGY

## 2025-06-05 PROCEDURE — 36415 COLL VENOUS BLD VENIPUNCTURE: CPT

## 2025-06-05 PROCEDURE — 83605 ASSAY OF LACTIC ACID: CPT

## 2025-06-05 PROCEDURE — 85025 COMPLETE CBC W/AUTO DIFF WBC: CPT

## 2025-06-05 PROCEDURE — 99213 OFFICE O/P EST LOW 20 MIN: CPT | Performed by: FAMILY MEDICINE

## 2025-06-05 PROCEDURE — 74018 RADEX ABDOMEN 1 VIEW: CPT | Mod: 26 | Performed by: RADIOLOGY

## 2025-06-05 PROCEDURE — 3078F DIAST BP <80 MM HG: CPT | Performed by: FAMILY MEDICINE

## 2025-06-05 PROCEDURE — 81001 URINALYSIS AUTO W/SCOPE: CPT

## 2025-06-05 PROCEDURE — 74177 CT ABD & PELVIS W/CONTRAST: CPT

## 2025-06-05 PROCEDURE — 84484 ASSAY OF TROPONIN QUANT: CPT

## 2025-06-05 PROCEDURE — 250N000009 HC RX 250: Performed by: STUDENT IN AN ORGANIZED HEALTH CARE EDUCATION/TRAINING PROGRAM

## 2025-06-05 PROCEDURE — 120N000001 HC R&B MED SURG/OB

## 2025-06-05 PROCEDURE — 255N000002 HC RX 255 OP 636: Performed by: STUDENT IN AN ORGANIZED HEALTH CARE EDUCATION/TRAINING PROGRAM

## 2025-06-05 PROCEDURE — 99223 1ST HOSP IP/OBS HIGH 75: CPT | Performed by: STUDENT IN AN ORGANIZED HEALTH CARE EDUCATION/TRAINING PROGRAM

## 2025-06-05 PROCEDURE — 250N000011 HC RX IP 250 OP 636: Mod: JW | Performed by: STUDENT IN AN ORGANIZED HEALTH CARE EDUCATION/TRAINING PROGRAM

## 2025-06-05 PROCEDURE — 93010 ELECTROCARDIOGRAM REPORT: CPT | Performed by: INTERNAL MEDICINE

## 2025-06-05 PROCEDURE — 258N000003 HC RX IP 258 OP 636: Performed by: STUDENT IN AN ORGANIZED HEALTH CARE EDUCATION/TRAINING PROGRAM

## 2025-06-05 PROCEDURE — 93005 ELECTROCARDIOGRAM TRACING: CPT

## 2025-06-05 PROCEDURE — 99285 EMERGENCY DEPT VISIT HI MDM: CPT

## 2025-06-05 PROCEDURE — 3074F SYST BP LT 130 MM HG: CPT | Performed by: FAMILY MEDICINE

## 2025-06-05 PROCEDURE — 250N000013 HC RX MED GY IP 250 OP 250 PS 637: Performed by: STUDENT IN AN ORGANIZED HEALTH CARE EDUCATION/TRAINING PROGRAM

## 2025-06-05 RX ORDER — PROCHLORPERAZINE MALEATE 5 MG/1
5 TABLET ORAL EVERY 6 HOURS PRN
Status: DISCONTINUED | OUTPATIENT
Start: 2025-06-05 | End: 2025-06-06 | Stop reason: HOSPADM

## 2025-06-05 RX ORDER — SENNOSIDES 8.6 MG
1300 CAPSULE ORAL EVERY 8 HOURS PRN
COMMUNITY

## 2025-06-05 RX ORDER — HYDROMORPHONE HCL IN WATER/PF 6 MG/30 ML
0.4 PATIENT CONTROLLED ANALGESIA SYRINGE INTRAVENOUS
Status: DISCONTINUED | OUTPATIENT
Start: 2025-06-05 | End: 2025-06-06 | Stop reason: HOSPADM

## 2025-06-05 RX ORDER — ROSUVASTATIN CALCIUM 20 MG/1
20 TABLET, COATED ORAL DAILY
Status: DISCONTINUED | OUTPATIENT
Start: 2025-06-05 | End: 2025-06-06 | Stop reason: HOSPADM

## 2025-06-05 RX ORDER — DEXTROSE MONOHYDRATE, SODIUM CHLORIDE, AND POTASSIUM CHLORIDE 50; 1.49; 4.5 G/1000ML; G/1000ML; G/1000ML
INJECTION, SOLUTION INTRAVENOUS CONTINUOUS
Status: DISCONTINUED | OUTPATIENT
Start: 2025-06-05 | End: 2025-06-06 | Stop reason: HOSPADM

## 2025-06-05 RX ORDER — CALCIUM CARBONATE 500 MG/1
1000 TABLET, CHEWABLE ORAL 4 TIMES DAILY PRN
Status: DISCONTINUED | OUTPATIENT
Start: 2025-06-05 | End: 2025-06-06 | Stop reason: HOSPADM

## 2025-06-05 RX ORDER — HYDROXYCHLOROQUINE SULFATE 200 MG/1
200 TABLET, FILM COATED ORAL DAILY
Status: DISCONTINUED | OUTPATIENT
Start: 2025-06-05 | End: 2025-06-05 | Stop reason: DRUGHIGH

## 2025-06-05 RX ORDER — NALOXONE HYDROCHLORIDE 0.4 MG/ML
0.4 INJECTION, SOLUTION INTRAMUSCULAR; INTRAVENOUS; SUBCUTANEOUS
Status: DISCONTINUED | OUTPATIENT
Start: 2025-06-05 | End: 2025-06-06 | Stop reason: HOSPADM

## 2025-06-05 RX ORDER — NALOXONE HYDROCHLORIDE 0.4 MG/ML
0.2 INJECTION, SOLUTION INTRAMUSCULAR; INTRAVENOUS; SUBCUTANEOUS
Status: DISCONTINUED | OUTPATIENT
Start: 2025-06-05 | End: 2025-06-06 | Stop reason: HOSPADM

## 2025-06-05 RX ORDER — AMOXICILLIN 250 MG
2 CAPSULE ORAL 2 TIMES DAILY PRN
Status: DISCONTINUED | OUTPATIENT
Start: 2025-06-05 | End: 2025-06-06 | Stop reason: HOSPADM

## 2025-06-05 RX ORDER — HYDROMORPHONE HCL IN WATER/PF 6 MG/30 ML
0.2 PATIENT CONTROLLED ANALGESIA SYRINGE INTRAVENOUS
Status: DISCONTINUED | OUTPATIENT
Start: 2025-06-05 | End: 2025-06-06 | Stop reason: HOSPADM

## 2025-06-05 RX ORDER — ACETAMINOPHEN 650 MG/1
650 SUPPOSITORY RECTAL EVERY 4 HOURS PRN
Status: DISCONTINUED | OUTPATIENT
Start: 2025-06-05 | End: 2025-06-05 | Stop reason: ALTCHOICE

## 2025-06-05 RX ORDER — ONDANSETRON 2 MG/ML
4 INJECTION INTRAMUSCULAR; INTRAVENOUS ONCE
Status: COMPLETED | OUTPATIENT
Start: 2025-06-05 | End: 2025-06-05

## 2025-06-05 RX ORDER — BISACODYL 10 MG
10 SUPPOSITORY, RECTAL RECTAL DAILY PRN
Status: DISCONTINUED | OUTPATIENT
Start: 2025-06-05 | End: 2025-06-06 | Stop reason: HOSPADM

## 2025-06-05 RX ORDER — IBUPROFEN 200 MG
600 TABLET ORAL EVERY 6 HOURS PRN
COMMUNITY

## 2025-06-05 RX ORDER — HYDROXYCHLOROQUINE SULFATE 200 MG/1
400 TABLET, FILM COATED ORAL
Status: DISCONTINUED | OUTPATIENT
Start: 2025-06-06 | End: 2025-06-06 | Stop reason: HOSPADM

## 2025-06-05 RX ORDER — AMOXICILLIN 250 MG
1 CAPSULE ORAL 2 TIMES DAILY PRN
Status: DISCONTINUED | OUTPATIENT
Start: 2025-06-05 | End: 2025-06-06 | Stop reason: HOSPADM

## 2025-06-05 RX ORDER — ACETAMINOPHEN 325 MG/1
650 TABLET ORAL EVERY 4 HOURS PRN
Status: DISCONTINUED | OUTPATIENT
Start: 2025-06-05 | End: 2025-06-05 | Stop reason: ALTCHOICE

## 2025-06-05 RX ORDER — OXYCODONE HYDROCHLORIDE 5 MG/1
5 TABLET ORAL EVERY 4 HOURS PRN
Status: DISCONTINUED | OUTPATIENT
Start: 2025-06-05 | End: 2025-06-06 | Stop reason: HOSPADM

## 2025-06-05 RX ORDER — ASPIRIN 81 MG/1
81 TABLET, CHEWABLE ORAL DAILY
Status: DISCONTINUED | OUTPATIENT
Start: 2025-06-05 | End: 2025-06-06 | Stop reason: HOSPADM

## 2025-06-05 RX ORDER — POLYETHYLENE GLYCOL 3350 17 G/17G
17 POWDER, FOR SOLUTION ORAL 2 TIMES DAILY PRN
Status: DISCONTINUED | OUTPATIENT
Start: 2025-06-05 | End: 2025-06-06 | Stop reason: HOSPADM

## 2025-06-05 RX ORDER — ONDANSETRON 2 MG/ML
4 INJECTION INTRAMUSCULAR; INTRAVENOUS EVERY 6 HOURS PRN
Status: DISCONTINUED | OUTPATIENT
Start: 2025-06-05 | End: 2025-06-06 | Stop reason: HOSPADM

## 2025-06-05 RX ORDER — FENTANYL CITRATE 50 UG/ML
50 INJECTION, SOLUTION INTRAMUSCULAR; INTRAVENOUS
Status: COMPLETED | OUTPATIENT
Start: 2025-06-05 | End: 2025-06-05

## 2025-06-05 RX ORDER — PREDNISONE 10 MG/1
10 TABLET ORAL DAILY
COMMUNITY
End: 2025-06-16

## 2025-06-05 RX ORDER — METHYLPREDNISOLONE SODIUM SUCCINATE 40 MG/ML
20 INJECTION INTRAMUSCULAR; INTRAVENOUS DAILY
Status: DISCONTINUED | OUTPATIENT
Start: 2025-06-05 | End: 2025-06-06 | Stop reason: HOSPADM

## 2025-06-05 RX ORDER — IOPAMIDOL 755 MG/ML
69 INJECTION, SOLUTION INTRAVASCULAR ONCE
Status: COMPLETED | OUTPATIENT
Start: 2025-06-05 | End: 2025-06-05

## 2025-06-05 RX ORDER — HYDROXYCHLOROQUINE SULFATE 200 MG/1
200 TABLET, FILM COATED ORAL
Status: DISCONTINUED | OUTPATIENT
Start: 2025-06-07 | End: 2025-06-06 | Stop reason: HOSPADM

## 2025-06-05 RX ORDER — PANTOPRAZOLE SODIUM 40 MG/1
40 TABLET, DELAYED RELEASE ORAL DAILY
Status: DISCONTINUED | OUTPATIENT
Start: 2025-06-05 | End: 2025-06-05

## 2025-06-05 RX ORDER — LIDOCAINE 40 MG/G
CREAM TOPICAL
Status: DISCONTINUED | OUTPATIENT
Start: 2025-06-05 | End: 2025-06-06 | Stop reason: HOSPADM

## 2025-06-05 RX ORDER — FAMOTIDINE 20 MG/1
20 TABLET, FILM COATED ORAL EVERY EVENING
COMMUNITY

## 2025-06-05 RX ORDER — CEFTRIAXONE 1 G/1
1 INJECTION, POWDER, FOR SOLUTION INTRAMUSCULAR; INTRAVENOUS ONCE
Status: CANCELLED | OUTPATIENT
Start: 2025-06-05 | End: 2025-06-05

## 2025-06-05 RX ORDER — ACETAMINOPHEN 10 MG/ML
1000 INJECTION, SOLUTION INTRAVENOUS EVERY 8 HOURS PRN
Status: DISCONTINUED | OUTPATIENT
Start: 2025-06-05 | End: 2025-06-06 | Stop reason: HOSPADM

## 2025-06-05 RX ORDER — ONDANSETRON 4 MG/1
4 TABLET, ORALLY DISINTEGRATING ORAL EVERY 6 HOURS PRN
Status: DISCONTINUED | OUTPATIENT
Start: 2025-06-05 | End: 2025-06-06 | Stop reason: HOSPADM

## 2025-06-05 RX ADMIN — ONDANSETRON 4 MG: 2 INJECTION INTRAMUSCULAR; INTRAVENOUS at 12:41

## 2025-06-05 RX ADMIN — FENTANYL CITRATE 25 MCG: 50 INJECTION INTRAMUSCULAR; INTRAVENOUS at 15:50

## 2025-06-05 RX ADMIN — SODIUM CHLORIDE 500 ML: 0.9 INJECTION, SOLUTION INTRAVENOUS at 15:02

## 2025-06-05 RX ADMIN — FENTANYL CITRATE 50 MCG: 50 INJECTION INTRAMUSCULAR; INTRAVENOUS at 12:37

## 2025-06-05 RX ADMIN — ACETAMINOPHEN 650 MG: 325 TABLET ORAL at 17:39

## 2025-06-05 RX ADMIN — METHYLPREDNISOLONE SODIUM SUCCINATE 20 MG: 40 INJECTION, POWDER, FOR SOLUTION INTRAMUSCULAR; INTRAVENOUS at 19:29

## 2025-06-05 RX ADMIN — FENTANYL CITRATE 50 MCG: 50 INJECTION INTRAMUSCULAR; INTRAVENOUS at 15:04

## 2025-06-05 RX ADMIN — POTASSIUM CHLORIDE, DEXTROSE MONOHYDRATE AND SODIUM CHLORIDE 1000 ML: 150; 5; 450 INJECTION, SOLUTION INTRAVENOUS at 17:38

## 2025-06-05 RX ADMIN — IOPAMIDOL 69 ML: 755 INJECTION, SOLUTION INTRAVENOUS at 13:42

## 2025-06-05 RX ADMIN — WATER 150 ML: 100 IRRIGANT IRRIGATION at 19:25

## 2025-06-05 RX ADMIN — SODIUM CHLORIDE 60 ML: 9 INJECTION, SOLUTION INTRAVENOUS at 13:42

## 2025-06-05 ASSESSMENT — ACTIVITIES OF DAILY LIVING (ADL)
ADLS_ACUITY_SCORE: 19
ADLS_ACUITY_SCORE: 19
ADLS_ACUITY_SCORE: 43
ADLS_ACUITY_SCORE: 19
ADLS_ACUITY_SCORE: 43
ADLS_ACUITY_SCORE: 19
ADLS_ACUITY_SCORE: 43
ADLS_ACUITY_SCORE: 17
ADLS_ACUITY_SCORE: 19
ADLS_ACUITY_SCORE: 43

## 2025-06-05 ASSESSMENT — ENCOUNTER SYMPTOMS
ABDOMINAL PAIN: 1
VOMITING: 1
NAUSEA: 1

## 2025-06-05 ASSESSMENT — COLUMBIA-SUICIDE SEVERITY RATING SCALE - C-SSRS
1. IN THE PAST MONTH, HAVE YOU WISHED YOU WERE DEAD OR WISHED YOU COULD GO TO SLEEP AND NOT WAKE UP?: NO
2. HAVE YOU ACTUALLY HAD ANY THOUGHTS OF KILLING YOURSELF IN THE PAST MONTH?: NO
6. HAVE YOU EVER DONE ANYTHING, STARTED TO DO ANYTHING, OR PREPARED TO DO ANYTHING TO END YOUR LIFE?: NO

## 2025-06-05 ASSESSMENT — PAIN SCALES - GENERAL: PAINLEVEL_OUTOF10: SEVERE PAIN (10)

## 2025-06-05 NOTE — NURSING NOTE
"Chief Complaint   Patient presents with    Abdominal Pain     Started last night        Initial /64 (BP Location: Right arm, Patient Position: Sitting, Cuff Size: Adult Regular)   Pulse 118   Temp 97.3  F (36.3  C) (Temporal)   Resp 24   LMP 07/22/1993   SpO2 100%  Estimated body mass index is 27.05 kg/m  as calculated from the following:    Height as of 3/16/24: 1.448 m (4' 9\").    Weight as of 4/14/25: 56.7 kg (125 lb).  Medication Review: complete    The next two questions are to help us understand your food security.  If you are feeling you need any assistance in this area, we have resources available to support you today.           No data to display                  Health Care Directive:  Patient does not have a Health Care Directive: Discussed advance care planning with patient; however, patient declined at this time.    Khushboo Lerner LPN      "

## 2025-06-05 NOTE — ED TRIAGE NOTES
Pt presents to the ED with generalized abdominal pain that started at 2100 last night and vomiting.     Triage Assessment (Adult)       Row Name 06/05/25 1145          Triage Assessment    Airway WDL WDL        Respiratory WDL    Respiratory WDL WDL        Skin Circulation/Temperature WDL    Skin Circulation/Temperature WDL WDL        Cardiac WDL    Cardiac WDL WDL        Peripheral/Neurovascular WDL    Peripheral Neurovascular WDL WDL        Cognitive/Neuro/Behavioral WDL    Cognitive/Neuro/Behavioral WDL WDL

## 2025-06-05 NOTE — PHARMACY-ADMISSION MEDICATION HISTORY
"Pharmacist Admission Medication History    Admission medication history is complete. The information provided in this note is only as accurate as the sources available at the time of the update.    Information Source(s): Patient via in-person interview  -Edgar  -3/5 [facility] nurse triage note    Pertinent Information:   -Prednisone: pt on long-term Prednisone 10 mg/day.    -APAP CR and Ibuprofen: takes Acetaminophen CR 1,300 mg and Ibuprofen 600 mg at least twice daily on average for pain control.     -Aspirin: pt takes \"when I remember.\"      Changes made to PTA medication list:  Added:   Prednisone  Famotidine  Acetaminophen CR  Ibuprofen  Deleted:   Pantoprazole  Changed: None    Allergies reviewed with patient and updates made in EHR: yes- no changes made at this time- pt confirmed NKDA.      Medication History Completed By: Yobani Montgomery RPH 6/5/2025 6:24 PM  "

## 2025-06-05 NOTE — ED NOTES
Patient transported to [facility] with this RN at 1700 via cart. When removing patient's BP cuff, writer noticed patient felt hot. Tympanic temp read 101.5F. Report given to Milka FORREST who will take over patient's care.

## 2025-06-05 NOTE — PROGRESS NOTES
Preventing Falls in the Hospital (02:42)  Your health professional recommends that you watch this short online health video.  Find out why you're at risk for falling in the hospital and how to prevent falls.   Purpose: Understand what increases a person's risk of falling in the hospital and how to prevent falls.  Goal: Understand what increases a person's risk of falling in the hospital and how to prevent falls.    Watch: Scan the QR code or visit the link to view video       https://hwi.[email or web address]  Current as of: July 17, 2023  Content Version: 14.2 2024 [facility] [employer].   Care instructions adapted under license by your healthcare professional. If you have questions about a medical condition or this instruction, always ask your healthcare professional. [employer] disclaims any warranty or liability for your use of this information.  Preventing Falls in the Hospital

## 2025-06-05 NOTE — PROGRESS NOTES
Pt arrived to 331, VSS & febrile 101.1, abdominal pain. Pt received acetaminophen per order.  Standby assist x 1 due to weakness. Pt comes from home and lives with son and grandson. No needs at this time.

## 2025-06-05 NOTE — PROGRESS NOTES
" [facility] [place]    Patient admitted to room 331 at approximately 1715 via cart from emergency room. Patient was accompanied by transport tech.     Verbal SBAR report received from Jordana prior to patient arrival.     Patient ambulated to bed with one assist. Patient alert and oriented X 3. Pain is controlled with current analgesics.  Medication(s) being used: acetaminophen.  . Admission vital signs: Blood pressure 133/60, pulse 102, temperature 101.1  F (38.4  C), resp. rate 22, height 1.473 m (4' 10\"), weight 54.4 kg (120 lb), last menstrual period 07/22/1993, SpO2 98%, not currently breastfeeding. Patient was oriented to plan of care, call light, bed controls, tv, telephone, bathroom, and visiting hours.       Milka Johansen RN      "

## 2025-06-05 NOTE — H&P
"[facility]    History and Physical - Hospitalist Service       Date of Admission:  6/5/2025    Assessment & Plan      Rebekah Shirley is a 80 year old woman with a past medical history of upper lipidemia, coronary artery disease, anxiety, and rheumatoid arthritis who was admitted to the hospital for abdominal pain and vomiting.     Principal Problem:    Abdominal pain    Assessment: Abdominal pain and vomiting concerning for small bowel obstruction but narrowing of the lumen of her abdomen and abnormal findings on CT scan.  Also could be gastroenteritis, malignancy versus bacterial overgrowth.  General surgery suspicious that this is bacterial overgrowth. May need laparoscopy.  Will trial Gastrografin challenge    Plan:   - Admit inpatient  - Enteric panel  - Nausea and pain control   - Gastrografin challenge overnight  - NPO, ice, sips of fluids and medicines okay  - Consult general surgery  -D5 1/2 NS @75/hr    Active Problems:    Mixed hyperlipidemia    Non-occlusive coronary artery disease based on a cardiac catheterization through [facility] in June, 2019    Assessment: Hold home statin and aspirin     Rheumatoid arthritis: Hold home Plaquenil while NPO.        Diet: NPO for Medical/Clinical Reasons Except for: Meds    DVT Prophylaxis: Pneumatic Compression Devices  Thomas Catheter: Not present  Lines: None     Cardiac Monitoring: None  Code Status:  Full code    Clinically Significant Risk Factors Present on Admission           # Hypocalcemia: Lowest Ca = 8.2 mg/dL in last 2 days, will monitor and replace as appropriate       # Drug Induced Platelet Defect: home medication list includes an antiplatelet medication          # Anemia: based on hgb <11       # Overweight: Estimated body mass index is 25.08 kg/m  as calculated from the following:    Height as of this encounter: 1.473 m (4' 10\").    Weight as of this encounter: 54.4 kg (120 lb).              Disposition Plan     Medically Ready " for Discharge: Anticipated in 2-4 Days           Jimbo Clay MD  Hospitalist Service  [facility]  Securely message with [employer] (more info)  Text page via [facility] Paging/Directory     ______________________________________________________________________    Chief Complaint   Vomiting.     History is obtained from the patient, ED provider    History of Present Illness   Rebekah Shirley is a 80 year old woman with a past medical history of rheumatoid arthritis, coronary disease and prior tobacco use who presented to the emergency department with abdominal pain and vomiting.    The patient states that her daughter has been sick recently with abdominal pain and likely some vomiting as well.  She states that her abdomen began to hurt last night-she developed a constant epigastric pain rated 10 out of 10.  She has had multiple episodes of emesis throughout the night.  She has had more issues with constipation and decreased stool.  Most recent bowel movement was this morning.  Does not think she has passed too much gas since then.  She has not had any travel recently.  No abnormal foods.  She states she ate a lot of popcorn and peanuts yesterday.  Last colonoscopy was in .  She did have a laparotomy due to prior small bowel obstruction with small area of adhesions, open appendectomy and tubal ligation in the past.      Past Medical History    Past Medical History:   Diagnosis Date    Hyperlipidemia     No Comments Provided    Nicotine dependence, uncomplicated     No Comments Provided    Personal history of other medical treatment (CODE)      4, para 3.    Rheumatoid arthritis (H)        Past Surgical History   Past Surgical History:   Procedure Laterality Date    APPENDECTOMY OPEN      Incidental appendectomy at time of adhesion lysis.    COLONOSCOPY      06,Colonoscopy (screening), normal, Dr. Orellana    LAPAROSCOPIC TUBAL LIGATION          LAPAROTOMY EXPLORATORY      History of  partial small bowel obstruction necessitating exploratory laparotomy.  Small area of adhesions with secondary lysis felt responsible for the partial obstruction.       Prior to Admission Medications   Prior to Admission Medications   Prescriptions Last Dose Informant Patient Reported? Taking?   Cholecalciferol (VITAMIN D3) 50 MCG (2000 UT) CAPS   Yes No   Sig: Take 1 capsule by mouth daily.   aspirin (ASA) 81 MG chewable tablet   No No   Sig: Take 1 tablet (81 mg) by mouth daily   hydroxychloroquine (PLAQUENIL) 200 MG tablet   Yes No   Sig: Take 400 mg on MWF, 200 mg on Tues,Thurs,Sat,Sun. Take with food.  Indications: Rheumatoid Arthritis   nitroGLYcerin (NITROSTAT) 0.4 MG sublingual tablet   No No   Sig: Place 1 tablet (0.4 mg) under the tongue every 5 minutes as needed for chest pain   pantoprazole (PROTONIX) 20 MG EC tablet   No No   Sig: TAKE 2 TABLETS(40 MG) BY MOUTH DAILY   Patient not taking: Reported on 4/14/2025   rosuvastatin (CRESTOR) 20 MG tablet   No No   Sig: TAKE 1 TABLET BY MOUTH EVERY DAY      Facility-Administered Medications: None           Physical Exam   Vital Signs: Temp: (!) 96.5  F (35.8  C) Temp src: Tympanic BP: 132/89 Pulse: 103   Resp: 22 SpO2: 97 % O2 Device: Nasal cannula Oxygen Delivery: 1 LPM  Weight: 120 lbs 0 oz  General: Pleasant 80-year-old woman lying in bed appears somewhat uncomfortable  CV: Tachycardia rate, regular rhythm no edema appreciated  Pulmonary: Clear to auscultation  GI: Soft, voluntary guarding no rebound tenderness.  Neuro: Normal mood and affect    Medical Decision Making             Data     I have personally reviewed the following data over the past 24 hrs:    15.0 (H)  \   10.8 (L)   / 439     137 101 17.5 /  102 (H)   4.8 21 (L) 0.68 \     ALT: 10 AST: 30 AP: 95 TBILI: 0.3   ALB: 3.6 TOT PROTEIN: 7.8 LIPASE: 20     Trop: 9 BNP: N/A     Procal: 0.07 CRP: 41.38 (H) Lactic Acid: 1.7         Imaging results reviewed over the past 24 hrs:   Recent Results (from  the past 24 hours)   CT Abdomen Pelvis w Contrast    Narrative    PROCEDURE:  CT ABDOMEN PELVIS W CONTRAST    HISTORY: generalized abdominal pain, vomiting, increased WBC; tender  LLQ. history appendectomy    TECHNIQUE: Helical CT of the abdomen and pelvis was performed  following injection of intravenous contrast. This CT exam was  performed using one or more the following dose reduction techniques:  automated exposure control, adjustment of the mA and/or kV according  to patient size, and/or iterative reconstruction technique.    COMPARISON: 7/18/23.    MEDS/CONTRAST: 100 ml isovue 370    FINDINGS:      Limited images through the lung bases demonstrate no focal  consolidation. There is a moderate hiatal hernia and coronary  calcifications.    There is progressive dilation of the mid small bowel until an abnormal  segment with portions dilated over 4 cm. Multiple outpouchings of the  small bowel wall are seen in this area, best seen on coronal images 56  and 49. The distal small bowel and colon are mostly decompressed. No  remote free air is seen. Adjacent free fluid is seen.    The liver demonstrates no mass or intrahepatic biliary ductal  dilatation. The gallbladder is unremarkable. The spleen, pancreas and  adrenal glands are unremarkable. Symmetric nephrograms are present  without hydronephrosis. Diffuse atherosclerotic disease is seen. There  is dilation of the infrarenal aorta up to 2.7 cm.    Osteoporosis. Mild L1 vertebral body height loss. No suspicious  osseous lesions are identified.      Impression    IMPRESSION:      Mid small bowel obstruction. Multiple outpouchings of the dilated mid  small bowel loops are seen. There is unclear whether these reflect  areas of pseudoaneurysm/early wall dehiscence without complete rupture  versus multiple small bowel diverticula. Multiplicity argues against  Meckel diverticulum. No remote free air at this time. An underlying  connective tissue disorder is possible.  Recommend surgical  consultation. The risk of progression to bowel rupture may be high.    LOBO DODGE MD         SYSTEM ID:  [id number]

## 2025-06-05 NOTE — ED PROVIDER NOTES
History     Chief Complaint   Patient presents with    Abdominal Pain     The history is provided by the patient and medical records.     Rebekah Shirley is a 80 year old female who presents to the ER today from the clinic for concerns for generalized abdominal pain that started last night. Pain is constant, she cannot describe, reports it is a 10/10. She has been vomiting, no blood in vomit. Reports problems with having bowel movements, decreased size in bowel movements last 2 days which is abnormal for her.denies diarrhea, blood in stool,  Feels dizzy and hot when she stands up.     PMH: Anxiety, hyperlipidemia, tobacco use, aortic atherosclerosis, GERD, RA, hypertension    Allergies:  No Known Allergies    Problem List:    Patient Active Problem List    Diagnosis Date Noted    SBO (small bowel obstruction) (H) 06/05/2025     Priority: Medium    Abdominal pain 06/05/2025     Priority: Medium    Rheumatoid arthritis involving multiple sites with positive rheumatoid factor (H) 02/23/2021     Priority: Medium     Per [facility]      Peripheral tear of medial meniscus of right knee as current injury 02/03/2021     Priority: Medium    Gastroesophageal reflux disease with esophagitis without hemorrhage 12/22/2020     Priority: Medium    Coronary artery calcification seen on CAT scan 12/22/2020     Priority: Medium    Non-occlusive coronary artery disease based on a cardiac catheterization through [facility] in June, 2019 06/25/2019     Priority: Medium    Hx of cardiac catheterization June, 2019 through [facility] without stenting 06/25/2019     Priority: Medium    Aortic atherosclerosis 05/09/2019     Priority: Medium    Elevated coronary artery calcium score on 4/22/2019 at 606.7  05/08/2019     Priority: Medium    History of tobacco abuse  05/08/2019     Priority: Medium    Mixed hyperlipidemia 01/23/2018     Priority: Medium    Rheumatoid arthritis (H) 08/20/2013     Priority: Medium    Anxiety state  2013     Priority: Medium        Past Medical History:    Past Medical History:   Diagnosis Date    Hyperlipidemia     Nicotine dependence, uncomplicated     Personal history of other medical treatment (CODE)     Rheumatoid arthritis (H)        Past Surgical History:    Past Surgical History:   Procedure Laterality Date    APPENDECTOMY OPEN      Incidental appendectomy at time of adhesion lysis.    COLONOSCOPY      06,Colonoscopy (screening), normal, Dr. Orellana    LAPAROSCOPIC TUBAL LIGATION      1982    LAPAROTOMY EXPLORATORY      History of partial small bowel obstruction necessitating exploratory laparotomy.  Small area of adhesions with secondary lysis felt responsible for the partial obstruction.       Family History:    Family History   Problem Relation Age of Onset    Heart Disease Father 58        MI    Arthritis Mother     Hyperlipidemia Mother     Other - See Comments Sister         Occupational lung disease    Other - See Comments Brother         Laryngeal cancer    Heart Disease Sister         Surgery for heart       Social History:  Marital Status:  Single [1]  Social History     Tobacco Use    Smoking status: Former     Current packs/day: 0.00     Average packs/day: 0.3 packs/day for 15.0 years (3.8 ttl pk-yrs)     Types: Cigarettes     Start date: 1993     Quit date: 2008     Years since quittin.5     Passive exposure: Never    Smokeless tobacco: Former     Quit date: 2/15/2008   Vaping Use    Vaping status: Never Used   Substance Use Topics    Alcohol use: No    Drug use: No        Medications:    aspirin (ASA) 81 MG chewable tablet  Cholecalciferol (VITAMIN D3) 50 MCG ( UT) CAPS  hydroxychloroquine (PLAQUENIL) 200 MG tablet  nitroGLYcerin (NITROSTAT) 0.4 MG sublingual tablet  pantoprazole (PROTONIX) 20 MG EC tablet  rosuvastatin (CRESTOR) 20 MG tablet          Review of Systems   Gastrointestinal:  Positive for abdominal pain, nausea and vomiting.   All other  "systems reviewed and are negative.  See HPI    Physical Exam   BP: 110/72  Pulse: 120  Temp: (!) 96.5  F (35.8  C)  Resp: 22  Height: 147.3 cm (4' 10\")  Weight: 54.4 kg (120 lb) (per pt)  SpO2: 100 %      Physical Exam  Vitals and nursing note reviewed.   Constitutional:       General: She is in acute distress.      Appearance: She is ill-appearing. She is not toxic-appearing.   HENT:      Mouth/Throat:      Mouth: Mucous membranes are moist.   Cardiovascular:      Rate and Rhythm: Regular rhythm. Tachycardia present.      Heart sounds: Normal heart sounds.   Pulmonary:      Effort: Pulmonary effort is normal.      Breath sounds: Normal breath sounds.   Abdominal:      General: Abdomen is flat. Bowel sounds are normal.      Palpations: Abdomen is soft.      Tenderness: There is generalized abdominal tenderness and tenderness in the left lower quadrant.   Skin:     General: Skin is warm.      Capillary Refill: Capillary refill takes less than 2 seconds.   Neurological:      General: No focal deficit present.      Mental Status: She is alert.   Psychiatric:         Mood and Affect: Mood is anxious.         ED Course            EKG Interpretation:      Interpreted by JULIET Anaya CNP  Time reviewed: 1235  Symptoms at time of EKG: abdominal pain   Rhythm: sinus tachycardia   Rate: 104  Conduction: normal  ST Segments/ T Waves: No acute ischemic changes  Comparison to prior: similar  Clinical Impression: as above  Pending  EKG over read by internal medicine            Results for orders placed or performed during the hospital encounter of 06/05/25 (from the past 24 hours)   [facility] Draw    Narrative    The following orders were created for panel order [place] Draw.  Procedure                               Abnormality         Status                     ---------                               -----------         ------                     Extra Blue Top Tube[[id number]]                                                 "        Extra Red Top Tube[[id number]]                              Final result               Extra Green Top (Lithiu...[[id number]]                      Final result               Extra Purple Top Tube[[id number]]                           Final result               Extra Green Top (Lithiu...[[id number]]                      Final result                 Please view results for these tests on the individual orders.   Extra Red Top Tube   Result Value Ref Range    Hold Specimen JIC    Extra Green Top (Lithium Heparin) Tube   Result Value Ref Range    Hold Specimen JIC    Extra Purple Top Tube   Result Value Ref Range    Hold Specimen JIC    Extra Green Top (Lithium Heparin) ON ICE   Result Value Ref Range    Hold Specimen JIC    CBC with platelets differential    Narrative    The following orders were created for panel order CBC with platelets differential.  Procedure                               Abnormality         Status                     ---------                               -----------         ------                     CBC with platelets and ...[[id number]]  Abnormal            Final result                 Please view results for these tests on the individual orders.   Comprehensive metabolic panel   Result Value Ref Range    Sodium 137 135 - 145 mmol/L    Potassium 4.8 3.4 - 5.3 mmol/L    Carbon Dioxide (CO2) 21 (L) 22 - 29 mmol/L    Anion Gap 15 7 - 15 mmol/L    Urea Nitrogen 17.5 8.0 - 23.0 mg/dL    Creatinine 0.68 0.51 - 0.95 mg/dL    GFR Estimate 89 >60 mL/min/1.73m2    Calcium 8.2 (L) 8.8 - 10.4 mg/dL    Chloride 101 98 - 107 mmol/L    Glucose 102 (H) 70 - 99 mg/dL    Alkaline Phosphatase 95 40 - 150 U/L    AST 30 0 - 45 U/L    ALT 10 0 - 50 U/L    Protein Total 7.8 6.4 - 8.3 g/dL    Albumin 3.6 3.5 - 5.2 g/dL    Bilirubin Total 0.3 <=1.2 mg/dL   Lactic Acid Whole Blood with 1X Repeat in 2 HR when >2   Result Value Ref Range    Lactic Acid, Initial 1.7 0.7 - 2.0 mmol/L   Lipase   Result Value Ref  Range    Lipase 20 13 - 60 U/L   Troponin T, High Sensitivity   Result Value Ref Range    Troponin T, High Sensitivity 9 <=14 ng/L   CRP inflammation   Result Value Ref Range    CRP Inflammation 41.38 (H) <5.00 mg/L   CBC with platelets and differential   Result Value Ref Range    WBC Count 15.0 (H) 4.0 - 11.0 10e3/uL    RBC Count 4.40 3.80 - 5.20 10e6/uL    Hemoglobin 10.8 (L) 11.7 - 15.7 g/dL    Hematocrit 35.0 35.0 - 47.0 %    MCV 80 78 - 100 fL    MCH 24.5 (L) 26.5 - 33.0 pg    MCHC 30.9 (L) 31.5 - 36.5 g/dL    RDW 17.3 (H) 10.0 - 15.0 %    Platelet Count 439 150 - 450 10e3/uL    % Neutrophils 83 %    % Lymphocytes 9 %    % Monocytes 6 %    % Eosinophils 1 %    % Basophils 0 %    % Immature Granulocytes 1 %    NRBCs per 100 WBC 0 <1 /100    Absolute Neutrophils 12.5 (H) 1.6 - 8.3 10e3/uL    Absolute Lymphocytes 1.3 0.8 - 5.3 10e3/uL    Absolute Monocytes 1.0 0.0 - 1.3 10e3/uL    Absolute Eosinophils 0.1 0.0 - 0.7 10e3/uL    Absolute Basophils 0.0 0.0 - 0.2 10e3/uL    Absolute Immature Granulocytes 0.1 <=0.4 10e3/uL    Absolute NRBCs 0.0 10e3/uL   Procalcitonin   Result Value Ref Range    Procalcitonin 0.07 <0.50 ng/mL   CT Abdomen Pelvis w Contrast    Narrative    PROCEDURE:  CT ABDOMEN PELVIS W CONTRAST    HISTORY: generalized abdominal pain, vomiting, increased WBC; tender  LLQ. history appendectomy    TECHNIQUE: Helical CT of the abdomen and pelvis was performed  following injection of intravenous contrast. This CT exam was  performed using one or more the following dose reduction techniques:  automated exposure control, adjustment of the mA and/or kV according  to patient size, and/or iterative reconstruction technique.    COMPARISON: 7/18/23.    MEDS/CONTRAST: 100 ml isovue 370    FINDINGS:      Limited images through the lung bases demonstrate no focal  consolidation. There is a moderate hiatal hernia and coronary  calcifications.    There is progressive dilation of the mid small bowel until an  abnormal  segment with portions dilated over 4 cm. Multiple outpouchings of the  small bowel wall are seen in this area, best seen on coronal images 56  and 49. The distal small bowel and colon are mostly decompressed. No  remote free air is seen. Adjacent free fluid is seen.    The liver demonstrates no mass or intrahepatic biliary ductal  dilatation. The gallbladder is unremarkable. The spleen, pancreas and  adrenal glands are unremarkable. Symmetric nephrograms are present  without hydronephrosis. Diffuse atherosclerotic disease is seen. There  is dilation of the infrarenal aorta up to 2.7 cm.    Osteoporosis. Mild L1 vertebral body height loss. No suspicious  osseous lesions are identified.      Impression    IMPRESSION:      Mid small bowel obstruction. Multiple outpouchings of the dilated mid  small bowel loops are seen. There is unclear whether these reflect  areas of pseudoaneurysm/early wall dehiscence without complete rupture  versus multiple small bowel diverticula. Multiplicity argues against  Meckel diverticulum. No remote free air at this time. An underlying  connective tissue disorder is possible. Recommend surgical  consultation. The risk of progression to bowel rupture may be high.    LOBO DODGE MD         SYSTEM ID:  [id number]   UA with Microscopic reflex to Culture    Specimen: Urine, Clean Catch   Result Value Ref Range    Color Urine Light Yellow Colorless, Straw, Light Yellow, Yellow    Appearance Urine Clear Clear    Glucose Urine Negative Negative mg/dL    Bilirubin Urine Negative Negative    Ketones Urine Negative Negative mg/dL    Specific Gravity Urine 1.010 1.003 - 1.035    Blood Urine Negative Negative    pH Urine 7.5 5.0 - 9.0    Protein Albumin Urine Negative Negative mg/dL    Urobilinogen Urine Normal Normal mg/dL    Nitrite Urine Positive (A) Negative    Leukocyte Esterase Urine Negative Negative    Bacteria Urine Few (A) None Seen /HPF    RBC Urine 5 (H) <=2 /HPF    WBC  Urine 1 <=5 /HPF    Narrative    Urine Culture ordered based on laboratory criteria   Troponin T, High Sensitivity   Result Value Ref Range    Troponin T, High Sensitivity 9 <=14 ng/L       Medications   diatrizoate meglumine-sodium (GASTROGRAFIN) 120 mL in sterile water (bottle) 150 mL (has no administration in time range)   dextrose 5% and 0.45% NaCl + KCl 20 mEq/L infusion (has no administration in time range)   fentaNYL (PF) (SUBLIMAZE) injection 50 mcg (25 mcg Intravenous $Given 6/5/25 1550)   ondansetron (ZOFRAN) injection 4 mg (4 mg Intravenous $Given 6/5/25 1241)   iopamidol (ISOVUE-370) solution 69 mL (69 mLs Intravenous $Given 6/5/25 1342)   sodium chloride 0.9 % bag for CT scan flush (60 mLs Intravenous $Given 6/5/25 1342)   sodium chloride 0.9% BOLUS 500 mL (500 mLs Intravenous $New Bag 6/5/25 1502)       Assessments & Plan (with Medical Decision Making)  Rebekah Shirley is a 80 year old female who presents to the ER today from the clinic for concerns for generalized abdominal pain that started last night. Pain is constant, she cannot describe, reports it is a 10/10. She has been vomiting, no blood in vomit. Reports problems with having bowel movements, decreased size in bowel movements last 2 days which is abnormal for her.denies diarrhea, blood in stool,  Feels dizzy and hot when she stands up.   PMH: Anxiety, hyperlipidemia, tobacco use, aortic atherosclerosis, GERD, RA, hypertension  Upon arrival she appears very uncomfortable, guarding her abdomen. Abdomen is soft, generally tender. She is vitally stable, afebrile.   Differential diagnosis includes but not limited to acute gastroenteritis, SBO, pancreatitis, cholecystitis, diverticulitis, mesenteric ischemia, UTI, nephrolithiasis, ACS, AAA  CBC: WBC 15 hemoglobin 10.8 (known anemia, stable)  CMP: stable   CRP: 41.38  Lipase 20  Troponin 9, 9-stable  Lactic acid 1.7  Urinalysis: positive nitrate, few bacteria, RBC culture pending. Previous culture  e-coli, discussed with Dr. Clay, will wait for culture to treat. She does not have urinary symptoms.  EKG stable  Meds: IVF, zofran, fentanyl  CT abdomen/pelvis: Mid small bowel obstruction. Multiple outpouchings of the dilated mid small bowel loops are seen. There is unclear whether these reflect areas of pseudoaneurysm/early wall dehiscence without complete rupture versus multiple small bowel diverticula. Multiplicity argues against Meckel diverticulum. No remote free air at this time. An underlying connective tissue disorder is possible. Recommend surgical consultation. The risk of progression to bowel rupture may be high  Abdominal pain: CT SBO, concerning for bowel rupture. 2:41 PM I consulted with Dr. Whelan general surgeon on call.  Recommended NG tube, IV fluids, admit to hospitalist he will review CT scan to determine if surgical intervention is needed.  3:00 PM: Dr. Whelan reviewed CT results. Disagrees with radiologist read for SBO.  Believes CT scan is consistent with bacterial overgrowth. Discussed further evaluation with gastrografin contrast overnight, antiemetics, No NG. I re-assessed patient and she if feeling somewhat better after interventions, continues to complain of abdominal tenderness.   I discussed surgeon recommendations with Dr. Clay, hospitalist, and he kindly accepted patient for admission. Patient is agreeable to plan.       I have reviewed the nursing notes.    I have reviewed the findings, diagnosis, plan and need for follow up with the patient.      Medical Decision Making  The patient's presentation was of moderate complexity (abdominal pain, vomiting).    The patient's evaluation involved:  review of external note(s) from 1 sources (see separate area of note for details)  review of 3+ test result(s) ordered prior to this encounter (see separate area of note for details)  ordering and/or review of 3+ test(s) in this encounter (see separate area of note for details)  discussion of  management or test interpretation with another health professional (see separate area of note for details)    The patient's management necessitated moderate risk (IV contrast administration) and high risk (a decision regarding hospitalization).        New Prescriptions    No medications on file       Final diagnoses:   Abdominal pain       6/5/2025   [facility]       Isamar Vidales APRN CNP  06/05/25 1618       Isamar Vidales APRN CNP  06/05/25 3699

## 2025-06-05 NOTE — PROGRESS NOTES
Chart review for admission to [facility]  Tammie Ho RN on 6/5/2025 at 4:30 PM

## 2025-06-05 NOTE — PROVIDER NOTIFICATION
06/05/25 1596   Initial Information   Patient Belongings remains with patient   Patient Belongings Remaining with Patient cell phone/electronics;vision aids;clothing;other (see comments)  (ipad)   Did you bring any home meds/supplements to the hospital?  No       [facility] will make every effort per our policy to help keep your items safe while in the hospital.  If you choose to keep any items at the bedside, we cannot be held responsible for any items that are lost or broken.        I have reviewed my belongings list on admission and verify that it is correct.     Patient signature_______________________________  Date/Time_____________________    2nd Staff person if patient unable to sign __________________________  Date/Time ______________________      I have received all my belongings noted above at discharge.    Patient signature________________________________  Date/Time  __________________________

## 2025-06-05 NOTE — ED NOTES
After administration of 50 mcg fentanyl, patient needing 1 liter nasal cannula as she desaturated to 86% when it caused her to fall asleep.  RT Patel notified and order obtained.

## 2025-06-05 NOTE — PROGRESS NOTES
"  Assessment & Plan     Generalized abdominal pain  Discussed with ER physician.  They accept transfer.  Patient is agreeable.        MED REC REQUIRED  Post Medication Reconciliation Status:   BMI  Estimated body mass index is 26 kg/m  as calculated from the following:    Height as of an earlier encounter on 6/5/25: 1.473 m (4' 10\").    Weight as of 6/6/25: 56.4 kg (124 lb 6.4 oz).             Cirilo Welch is a 80 year old, presenting for the following health issues:  Abdominal Pain (Started last night )        6/5/2025    11:16 AM   Additional Questions   Roomed by Khushboo MADDOX     Patient arrives here for abdominal pain.  Has been going on since yesterday.  Associated with nausea.    History of Present Illness       Reason for visit:  Abdominal Pain  Symptom onset:  1-3 days ago  Symptoms include:  Upper stomach pain. N/V weakness.  Symptom intensity:  Severe  Symptom progression:  Worsening  Had these symptoms before:  Yes  Has tried/received treatment for these symptoms:  No   She is taking medications regularly.                    Objective    /64 (BP Location: Right arm, Patient Position: Sitting, Cuff Size: Adult Regular)   Pulse 118   Temp 97.3  F (36.3  C) (Temporal)   Resp 24   LMP 07/22/1993   SpO2 100%   There is no height or weight on file to calculate BMI.  Physical Exam  Constitutional:       Comments: Patient appears to be in moderate distress.  Bending over at the waist grabbing her belly.   Abdominal:      General: There is no distension.      Tenderness: There is abdominal tenderness. There is no rebound.      Comments: There was some mild guarding present.   Neurological:      Mental Status: She is alert.                    Signed Electronically by: Vincent Fuller MD    "

## 2025-06-05 NOTE — PLAN OF CARE
Goal Outcome Evaluation:      Plan of Care Reviewed With: patient    Overall Patient Progress: no change    Outcome Evaluation: 101.1 temp and 5/10 abdominal discomfort acetaminophen, Treatment plan reviewed.

## 2025-06-06 VITALS
HEIGHT: 58 IN | SYSTOLIC BLOOD PRESSURE: 144 MMHG | DIASTOLIC BLOOD PRESSURE: 64 MMHG | RESPIRATION RATE: 12 BRPM | OXYGEN SATURATION: 95 % | BODY MASS INDEX: 26.11 KG/M2 | WEIGHT: 124.4 LBS | TEMPERATURE: 99.1 F | HEART RATE: 68 BPM

## 2025-06-06 LAB
ANION GAP SERPL CALCULATED.3IONS-SCNC: 13 MMOL/L (ref 7–15)
BASOPHILS # BLD AUTO: 0 10E3/UL (ref 0–0.2)
BASOPHILS NFR BLD AUTO: 0 %
BUN SERPL-MCNC: 19.3 MG/DL (ref 8–23)
CALCIUM SERPL-MCNC: 8 MG/DL (ref 8.8–10.4)
CHLORIDE SERPL-SCNC: 110 MMOL/L (ref 98–107)
CREAT SERPL-MCNC: 0.61 MG/DL (ref 0.51–0.95)
EGFRCR SERPLBLD CKD-EPI 2021: >90 ML/MIN/1.73M2
EOSINOPHIL # BLD AUTO: 0 10E3/UL (ref 0–0.7)
EOSINOPHIL NFR BLD AUTO: 0 %
ERYTHROCYTE [DISTWIDTH] IN BLOOD BY AUTOMATED COUNT: 17.5 % (ref 10–15)
GLUCOSE SERPL-MCNC: 99 MG/DL (ref 70–99)
HCO3 SERPL-SCNC: 18 MMOL/L (ref 22–29)
HCT VFR BLD AUTO: 31.4 % (ref 35–47)
HGB BLD-MCNC: 9.6 G/DL (ref 11.7–15.7)
IMM GRANULOCYTES # BLD: 0 10E3/UL
IMM GRANULOCYTES NFR BLD: 0 %
LYMPHOCYTES # BLD AUTO: 0.8 10E3/UL (ref 0.8–5.3)
LYMPHOCYTES NFR BLD AUTO: 9 %
MAGNESIUM SERPL-MCNC: 2.6 MG/DL (ref 1.7–2.3)
MCH RBC QN AUTO: 24.6 PG (ref 26.5–33)
MCHC RBC AUTO-ENTMCNC: 30.6 G/DL (ref 31.5–36.5)
MCV RBC AUTO: 81 FL (ref 78–100)
MONOCYTES # BLD AUTO: 0.4 10E3/UL (ref 0–1.3)
MONOCYTES NFR BLD AUTO: 4 %
NEUTROPHILS # BLD AUTO: 8.3 10E3/UL (ref 1.6–8.3)
NEUTROPHILS NFR BLD AUTO: 87 %
NRBC # BLD AUTO: 0 10E3/UL
NRBC BLD AUTO-RTO: 0 /100
PLAT MORPH BLD: NORMAL
PLATELET # BLD AUTO: 358 10E3/UL (ref 150–450)
POTASSIUM SERPL-SCNC: 4.8 MMOL/L (ref 3.4–5.3)
RBC # BLD AUTO: 3.9 10E6/UL (ref 3.8–5.2)
RBC MORPH BLD: NORMAL
SODIUM SERPL-SCNC: 141 MMOL/L (ref 135–145)
WBC # BLD AUTO: 9.6 10E3/UL (ref 4–11)

## 2025-06-06 PROCEDURE — 82565 ASSAY OF CREATININE: CPT | Performed by: STUDENT IN AN ORGANIZED HEALTH CARE EDUCATION/TRAINING PROGRAM

## 2025-06-06 PROCEDURE — 258N000003 HC RX IP 258 OP 636: Performed by: STUDENT IN AN ORGANIZED HEALTH CARE EDUCATION/TRAINING PROGRAM

## 2025-06-06 PROCEDURE — 85025 COMPLETE CBC W/AUTO DIFF WBC: CPT | Performed by: STUDENT IN AN ORGANIZED HEALTH CARE EDUCATION/TRAINING PROGRAM

## 2025-06-06 PROCEDURE — 250N000011 HC RX IP 250 OP 636: Mod: JW | Performed by: STUDENT IN AN ORGANIZED HEALTH CARE EDUCATION/TRAINING PROGRAM

## 2025-06-06 PROCEDURE — 96376 TX/PRO/DX INJ SAME DRUG ADON: CPT

## 2025-06-06 PROCEDURE — 83735 ASSAY OF MAGNESIUM: CPT | Performed by: STUDENT IN AN ORGANIZED HEALTH CARE EDUCATION/TRAINING PROGRAM

## 2025-06-06 PROCEDURE — 99239 HOSP IP/OBS DSCHRG MGMT >30: CPT | Performed by: STUDENT IN AN ORGANIZED HEALTH CARE EDUCATION/TRAINING PROGRAM

## 2025-06-06 PROCEDURE — 36415 COLL VENOUS BLD VENIPUNCTURE: CPT | Performed by: STUDENT IN AN ORGANIZED HEALTH CARE EDUCATION/TRAINING PROGRAM

## 2025-06-06 RX ADMIN — POTASSIUM CHLORIDE, DEXTROSE MONOHYDRATE AND SODIUM CHLORIDE: 150; 5; 450 INJECTION, SOLUTION INTRAVENOUS at 11:31

## 2025-06-06 RX ADMIN — METHYLPREDNISOLONE SODIUM SUCCINATE 20 MG: 40 INJECTION, POWDER, FOR SOLUTION INTRAMUSCULAR; INTRAVENOUS at 11:21

## 2025-06-06 ASSESSMENT — ACTIVITIES OF DAILY LIVING (ADL)
ADLS_ACUITY_SCORE: 30
ADLS_ACUITY_SCORE: 34
ADLS_ACUITY_SCORE: 30
ADLS_ACUITY_SCORE: 34
ADLS_ACUITY_SCORE: 30
ADLS_ACUITY_SCORE: 34
ADLS_ACUITY_SCORE: 34
ADLS_ACUITY_SCORE: 30
ADLS_ACUITY_SCORE: 34
ADLS_ACUITY_SCORE: 30

## 2025-06-06 NOTE — DISCHARGE SUMMARY
[facility]    Discharge Summary  Hospitalist    Date of Admission:  6/5/2025  Date of Discharge:  6/6/2025  Discharging Provider: Jimbo Clay MD  Date of Service (when I saw the patient): 06/06/25    Discharge Diagnoses   Principal Problem:    Abdominal pain    Date Noted: 6/5/2025  Active Problems:    Mixed hyperlipidemia    Date Noted: 1/23/2018    Non-occlusive coronary artery disease based on a cardiac catheterization through [facility] in June, 2019    Date Noted: 6/25/2019    Rheumatoid arthritis involving multiple sites with positive rheumatoid factor (H)    Date Noted: 2/23/2021    SBO (small bowel obstruction) (H)    Date Noted: 6/5/2025      History of Present Illness   Rebekah Shirley is a 80 year old woman with a past medical history of rheumatoid arthritis, coronary disease and prior tobacco use who presented to the emergency department with abdominal pain and vomiting.     The patient states that her daughter has been sick recently with abdominal pain and likely some vomiting as well.  She states that her abdomen began to hurt the night prior to admission.  She developed a constant epigastric pain rated 10 out of 10.  She has had multiple episodes of emesis throughout the night.  She has had more issues with constipation and decreased stool.  Most recent bowel movement was the morning of admission.  Does not think she has passed too much gas since then.  She has not had any travel recently.  No abnormal foods.  She states she ate a lot of popcorn and peanuts the day prior to admission.  Last colonoscopy was in 2006.  She did have a laparotomy due to prior small bowel obstruction with small area of adhesions, open appendectomy and tubal ligation in the past.    Patient was admitted to the hospital, general surgery consulted and recommended a Gastrografin challenge and follow-up in general surgery clinic as she had large bowel movement and tolerating oral diet.        Jimbo Clay  MD    Significant Results and Procedures       Pending Results   These results will be followed up by hospitalist  Unresulted Labs Ordered in the Past 30 Days of this Admission       Date and Time Order Name Status Description    6/5/2025  3:07 PM Urine Culture Preliminary             Code Status   Full Code       Primary Care Physician   Vincent Fuller    Physical Exam   Temp: 99.1  F (37.3  C) Temp src: Tympanic BP: (!) 144/64 Pulse: 68   Resp: 12 SpO2: 95 % O2 Device: None (Room air) Oxygen Delivery: 1 LPM  Vitals:    06/05/25 1145 06/06/25 0359   Weight: 54.4 kg (120 lb) 56.4 kg (124 lb 6.4 oz)     Vital Signs with Ranges  Temp:  [98  F (36.7  C)-100.8  F (38.2  C)] 99.1  F (37.3  C)  Pulse:  [68-98] 68  Resp:  [10-12] 12  BP: (112-155)/(52-71) 144/64  SpO2:  [95 %-97 %] 95 %  I/O last 3 completed shifts:  In: -   Out: 700 [Urine:700]    General: Pleasant 80-year-old woman lying in bed appears somewhat uncomfortable  CV: Tachycardia rate, regular rhythm no edema appreciated  Pulmonary: Clear to auscultation  GI: Soft, voluntary guarding no rebound tenderness.  Neuro: Normal mood and affect    Discharge Disposition   Discharged to home  Condition at discharge: Stable    Consultations This Hospital Stay   SURGERY GENERAL IP CONSULT    Time Spent on this Encounter   I, Jimbo Clay MD, personally saw the patient today and spent greater than 30 minutes discharging this patient.    Discharge Orders      Adult Gen Surg  Referral      Reason for your hospital stay    You were in the hospital for a bowel obstruction which has resolved. Please follow up with general surgery within 1 week for discussion of next steps.     Follow-up and recommended labs and tests     Follow up with Dr. Whelan , at (location with clinic name or city) [facility], within 1 week for hospital follow up and bowel obstruction follow up.     Activity    Your activity upon discharge: activity as tolerated     Diet    Follow this diet  upon discharge: Current Diet:Orders Placed This Encounter      Regular Diet Adult     Discharge Medications   Current Discharge Medication List        CONTINUE these medications which have NOT CHANGED    Details   acetaminophen (TYLENOL) 650 MG CR tablet Take 1,300 mg by mouth every 8 hours as needed for mild pain or fever.      aspirin (ASA) 81 MG chewable tablet Take 1 tablet (81 mg) by mouth daily  Qty: 90 tablet, Refills: 3    Associated Diagnoses: Chest pain, unspecified type      Cholecalciferol (VITAMIN D3) 50 MCG (2000 UT) CAPS Take 1 capsule by mouth daily.      famotidine (PEPCID) 20 MG tablet Take 20 mg by mouth every evening.      hydroxychloroquine (PLAQUENIL) 200 MG tablet Take 400 mg on MWF, 200 mg on Tues,Thurs,Sat,Sun. Take with food.  Indications: Rheumatoid Arthritis      ibuprofen (ADVIL/MOTRIN) 200 MG tablet Take 600 mg by mouth every 6 hours as needed for pain.      predniSONE (DELTASONE) 10 MG tablet Take 10 mg by mouth daily.      rosuvastatin (CRESTOR) 20 MG tablet TAKE 1 TABLET BY MOUTH EVERY DAY  Qty: 90 tablet, Refills: 3    Associated Diagnoses: Coronary artery calcification seen on CAT scan; Mixed hyperlipidemia; Elevated coronary artery calcium score; Non-occlusive coronary artery disease; Aortic atherosclerosis      nitroGLYcerin (NITROSTAT) 0.4 MG sublingual tablet Place 1 tablet (0.4 mg) under the tongue every 5 minutes as needed for chest pain  Qty: 25 tablet, Refills: 3    Associated Diagnoses: Angina of effort           Allergies   No Known Allergies  Data   Most Recent 3 CBC's:  Recent Labs   Lab Test 06/06/25  0616 06/05/25  1204 04/14/25  1109   WBC 9.6 15.0* 11.5*   HGB 9.6* 10.8* 9.4*   MCV 81 80 83    439 379      Most Recent 3 BMP's:  Recent Labs   Lab Test 06/06/25  0615 06/05/25  1204 03/29/25  0029    137 137   POTASSIUM 4.8 4.8 4.1   CHLORIDE 110* 101 102   CO2 18* 21* 21*   BUN 19.3 17.5 22.8   CR 0.61 0.68 0.72   ANIONGAP 13 15 14   VALERIE 8.0* 8.2* 8.3*    GLC 99 102* 88     Most Recent 2 LFT's:  Recent Labs   Lab Test 06/05/25  1204 03/29/25  0029   AST 30 14   ALT 10 11   ALKPHOS 95 87   BILITOTAL 0.3 0.2     Most Recent INR's and Anticoagulation Dosing History:  Anticoagulation Dose History          Latest Ref Rng & Units 11/13/2012 5/21/2015 3/16/2019   Recent Dosing and Labs   INR 0 - 1.3 1.0        Therapeutic Range 2.0-3.0 for most anticoagulated patients 2.5-3.5 or 4.0 for high risk patients 1.1  1.00      Most Recent 3 Troponin's:  Recent Labs   Lab Test 03/17/19  0005 03/16/19  1815 03/16/19  1635   TROPI <0.030 <0.030 <0.030     Most Recent Cholesterol Panel:  Recent Labs   Lab Test 06/25/19  1055   CHOL 154   LDL 68   HDL 58   TRIG 138     Most Recent 6 Bacteria Isolates From Any Culture (See EPIC Reports for Culture Details):No lab results found.  Most Recent TSH, T4 and A1c Labs:  Recent Labs   Lab Test 06/25/19  1055   A1C 5.3     Results for orders placed or performed during the hospital encounter of 06/05/25   CT Abdomen Pelvis w Contrast    Narrative    PROCEDURE:  CT ABDOMEN PELVIS W CONTRAST    HISTORY: generalized abdominal pain, vomiting, increased WBC; tender  LLQ. history appendectomy    TECHNIQUE: Helical CT of the abdomen and pelvis was performed  following injection of intravenous contrast. This CT exam was  performed using one or more the following dose reduction techniques:  automated exposure control, adjustment of the mA and/or kV according  to patient size, and/or iterative reconstruction technique.    COMPARISON: 7/18/23.    MEDS/CONTRAST: 100 ml isovue 370    FINDINGS:      Limited images through the lung bases demonstrate no focal  consolidation. There is a moderate hiatal hernia and coronary  calcifications.    There is progressive dilation of the mid small bowel until an abnormal  segment with portions dilated over 4 cm. Multiple outpouchings of the  small bowel wall are seen in this area, best seen on coronal images 56  and 49.  The distal small bowel and colon are mostly decompressed. No  remote free air is seen. Adjacent free fluid is seen.    The liver demonstrates no mass or intrahepatic biliary ductal  dilatation. The gallbladder is unremarkable. The spleen, pancreas and  adrenal glands are unremarkable. Symmetric nephrograms are present  without hydronephrosis. Diffuse atherosclerotic disease is seen. There  is dilation of the infrarenal aorta up to 2.7 cm.    Osteoporosis. Mild L1 vertebral body height loss. No suspicious  osseous lesions are identified.      Impression    IMPRESSION:      Mid small bowel obstruction. Multiple outpouchings of the dilated mid  small bowel loops are seen. There is unclear whether these reflect  areas of pseudoaneurysm/early wall dehiscence without complete rupture  versus multiple small bowel diverticula. Multiplicity argues against  Meckel diverticulum. No remote free air at this time. An underlying  connective tissue disorder is possible. Recommend surgical  consultation. The risk of progression to bowel rupture may be high.    LOBO DODGE MD         SYSTEM ID:  [id number]   XR Gastrografin Challenge    Narrative    EXAM: XR GASTROGRAFIN CHALLENGE  LOCATION: [facility]  DATE: 6/6/2025    INDICATION: Small Bowel Obstruction  COMPARISON: CT 6/5/2025  TECHNIQUE: Routine water soluble contrast follow-through challenge.      Impression    IMPRESSION:  1.  Water soluble contrast material IS identified within the colon 8 hours post administration.  2.  Mildly dilated loops of small bowel in the left hemiabdomen.

## 2025-06-06 NOTE — PROGRESS NOTES
Interdisciplinary Discharge Planning Note    Anticipated Discharge Date:1-2 days    Anticipated Discharge Location: Home    Clinical Needs Before Discharge:  Medical Stability, surgery following     Treatment Needs After Discharge:  None identified    Potential Barriers to Discharge: None Identified     SCOTTY Sullivan  6/6/2025,  2:04 PM

## 2025-06-06 NOTE — PLAN OF CARE
Goal Outcome Evaluation:      Plan of Care Reviewed With: patient    Overall Patient Progress: improvingOverall Patient Progress: improving    Outcome Evaluation: VSS, afebrile, diet advanced to regular and tolerating well.      Patient alert and oriented.  Family at bedside.

## 2025-06-06 NOTE — PLAN OF CARE
"Goal Outcome Evaluation:    VSS, A&O, pleasant. Afebrile, Gastrografin challenge completed, Very large loose BM this shift. Pt stated she felt better. Audible bowel sounds and abdomen in no longer tender. Remains NPO. All questions and concerns addressed.     BP (!) 155/71 (BP Location: Right arm, Patient Position: Semi-Curry's, Cuff Size: Adult Regular)   Pulse 68   Temp 98  F (36.7  C) (Tympanic)   Resp 12   Ht 1.473 m (4' 10\")   Wt 56.4 kg (124 lb 6.4 oz)   LMP 07/22/1993   SpO2 97%   BMI 26.00 kg/m          Plan of Care Reviewed With: patient    Overall Patient Progress: improving    Outcome Evaluation: VSS, Afebrile, Audible bowel sounds, large BM after gastrographen challenge      Cely Shukla RN on 6/6/2025 at 5:05 AM      "

## 2025-06-06 NOTE — PHARMACY - DISCHARGE MEDICATION RECONCILIATION AND EDUCATION
Pharmacy:  Discharge Counseling and Medication Reconciliation    Rebekah Shirley  [address]  [place] [address]  [phone] (home)   80 year old female  PCP: Vincent Fuller    Allergies: Patient has no known allergies.    Discharge Counseling:    Pharmacist met with patient (and/or family) today to review the medication portion of the After Visit Summary (with an emphasis on NEW medications) and to address patient's questions/concerns.    Summary of Education: Did not meet with patient, no changes to medication as appropriate.    Materials Provided:  MedCounselor sheets printed from Clinical Pharmacology on: N/A    Discharge Medication Reconciliation:    It has been determined that the patient has an adequate supply of medications available or which can be obtained from the patient's preferred pharmacy, which HE/SHE has confirmed as: N/A    Thank you for the consult.    Irasema Pete RP........June 6, 2025 5:58 PM

## 2025-06-06 NOTE — PROGRESS NOTES
SAFETY CHECKLIST  ID Bands and Risk clasps correct and in place (DNR, Fall risk, Allergy, Latex, Limb):  Yes  All Lines Reconciled and labeled correctly: Yes  Whiteboard updated:Yes  Environmental interventions: Yes  Verify Tele #: FRANK Shukla RN on 6/5/2025 at 7:07 PM  \

## 2025-06-06 NOTE — PLAN OF CARE
Goal Outcome Evaluation:      Plan of Care Reviewed With: patient    Overall Patient Progress: improvingOverall Patient Progress: improving    Outcome Evaluation: VSS, afebrile, diet advanced to regular and tolerating well.        Patient ready for discharge.

## 2025-06-06 NOTE — PROGRESS NOTES
[facility] DISCHARGE NOTE    Patient discharged to home at 6:48 PM via wheel chair. Accompanied by other:grand daughter and staff. Discharge instructions reviewed with patient, opportunity offered to ask questions. Prescriptions sent to patients preferred pharmacy. All belongings sent with patient.    Ingris Batista RN

## 2025-06-07 ENCOUNTER — RESULTS FOLLOW-UP (OUTPATIENT)
Dept: FAMILY MEDICINE | Facility: CLINIC | Age: 80
End: 2025-06-07

## 2025-06-07 LAB — BACTERIA UR CULT: ABNORMAL

## 2025-06-09 LAB
ATRIAL RATE - MUSE: 104 BPM
DIASTOLIC BLOOD PRESSURE - MUSE: NORMAL MMHG
INTERPRETATION ECG - MUSE: NORMAL
P AXIS - MUSE: 21 DEGREES
PR INTERVAL - MUSE: 134 MS
QRS DURATION - MUSE: 70 MS
QT - MUSE: 370 MS
QTC - MUSE: 486 MS
R AXIS - MUSE: -18 DEGREES
SYSTOLIC BLOOD PRESSURE - MUSE: NORMAL MMHG
T AXIS - MUSE: 33 DEGREES
VENTRICULAR RATE- MUSE: 104 BPM

## 2025-06-16 DIAGNOSIS — M05.79 RHEUMATOID ARTHRITIS INVOLVING MULTIPLE SITES WITH POSITIVE RHEUMATOID FACTOR (H): Primary | ICD-10-CM

## 2025-06-16 RX ORDER — PREDNISONE 10 MG/1
10 TABLET ORAL DAILY
Qty: 15 TABLET | Refills: 0 | Status: SHIPPED | OUTPATIENT
Start: 2025-06-16

## 2025-06-16 NOTE — TELEPHONE ENCOUNTER
predniSONE (DELTASONE) 10 MG tablet       Last Written Prescription Date:  hospital discharge reports says 10 mg daily  Last Fill Quantity: ,   # refills:   Last Office Visit: 6/5/25  Future Office visit:       Routing refill request to provider for review/approval because:  Drug not on the [facility], [facility] or [facility] refill protocol or controlled substance.  Unsure what dose and regimen patient is supposed to be taking?  Will forward to provider for consideration and assistance. Unable to complete prescription refill per RNMedication Refill Policy.................... Marlen Rodriguez RN ....................  6/16/2025   10:53 AM

## 2025-06-19 NOTE — TELEPHONE ENCOUNTER
Called and spoke with patient will see her next week for medication follow up. Sharon Perera LPN .......................6/19/2025  9:43 AM

## 2025-06-20 PROBLEM — K21.9 GERD (GASTROESOPHAGEAL REFLUX DISEASE): Status: ACTIVE | Noted: 2022-01-28

## 2025-06-20 PROBLEM — M81.0 OSTEOPOROSIS WITHOUT CURRENT PATHOLOGICAL FRACTURE, UNSPECIFIED OSTEOPOROSIS TYPE: Status: ACTIVE | Noted: 2024-08-16

## 2025-06-20 PROBLEM — I10 ESSENTIAL HYPERTENSION: Status: ACTIVE | Noted: 2022-01-28

## 2025-06-20 PROBLEM — G89.29 OTHER CHRONIC PAIN: Status: ACTIVE | Noted: 2022-04-21

## 2025-06-20 RX ORDER — LEFLUNOMIDE 20 MG/1
20 TABLET ORAL DAILY
COMMUNITY
Start: 2024-12-12 | End: 2025-06-24

## 2025-06-20 RX ORDER — NAPROXEN 500 MG/1
500 TABLET ORAL 2 TIMES DAILY WITH MEALS
COMMUNITY
Start: 2024-10-29

## 2025-06-20 RX ORDER — ERGOCALCIFEROL 1.25 MG/1
50000 CAPSULE, LIQUID FILLED ORAL WEEKLY
COMMUNITY
Start: 2024-10-03 | End: 2025-06-24

## 2025-06-24 ENCOUNTER — OFFICE VISIT (OUTPATIENT)
Dept: FAMILY MEDICINE | Facility: OTHER | Age: 80
End: 2025-06-24
Attending: FAMILY MEDICINE
Payer: MEDICARE

## 2025-06-24 ENCOUNTER — OFFICE VISIT (OUTPATIENT)
Dept: SURGERY | Facility: OTHER | Age: 80
End: 2025-06-24
Attending: SURGERY
Payer: MEDICARE

## 2025-06-24 VITALS
RESPIRATION RATE: 18 BRPM | OXYGEN SATURATION: 98 % | SYSTOLIC BLOOD PRESSURE: 150 MMHG | WEIGHT: 134.6 LBS | BODY MASS INDEX: 28.13 KG/M2 | HEART RATE: 103 BPM | DIASTOLIC BLOOD PRESSURE: 82 MMHG | TEMPERATURE: 98.6 F

## 2025-06-24 VITALS
TEMPERATURE: 98 F | DIASTOLIC BLOOD PRESSURE: 82 MMHG | HEART RATE: 92 BPM | RESPIRATION RATE: 20 BRPM | BODY MASS INDEX: 27.67 KG/M2 | WEIGHT: 132.4 LBS | OXYGEN SATURATION: 98 % | SYSTOLIC BLOOD PRESSURE: 150 MMHG

## 2025-06-24 DIAGNOSIS — E78.2 MIXED HYPERLIPIDEMIA: ICD-10-CM

## 2025-06-24 DIAGNOSIS — R93.1 ELEVATED CORONARY ARTERY CALCIUM SCORE: ICD-10-CM

## 2025-06-24 DIAGNOSIS — I10 PRIMARY HYPERTENSION: ICD-10-CM

## 2025-06-24 DIAGNOSIS — I25.10 NON-OCCLUSIVE CORONARY ARTERY DISEASE: ICD-10-CM

## 2025-06-24 DIAGNOSIS — K56.609 SMALL BOWEL OBSTRUCTION (H): Primary | ICD-10-CM

## 2025-06-24 DIAGNOSIS — M05.79 RHEUMATOID ARTHRITIS INVOLVING MULTIPLE SITES WITH POSITIVE RHEUMATOID FACTOR (H): Primary | ICD-10-CM

## 2025-06-24 DIAGNOSIS — I70.0 AORTIC ATHEROSCLEROSIS: ICD-10-CM

## 2025-06-24 PROCEDURE — G0463 HOSPITAL OUTPT CLINIC VISIT: HCPCS

## 2025-06-24 PROCEDURE — 99495 TRANSJ CARE MGMT MOD F2F 14D: CPT | Performed by: SURGERY

## 2025-06-24 RX ORDER — LISINOPRIL 5 MG/1
5 TABLET ORAL DAILY
Qty: 90 TABLET | Refills: 3 | Status: SHIPPED | OUTPATIENT
Start: 2025-06-24

## 2025-06-24 RX ORDER — PREDNISONE 10 MG/1
15 TABLET ORAL DAILY
Qty: 135 TABLET | Refills: 1 | Status: SHIPPED | OUTPATIENT
Start: 2025-06-24

## 2025-06-24 RX ORDER — ROSUVASTATIN CALCIUM 20 MG/1
TABLET, COATED ORAL
Qty: 90 TABLET | Refills: 3 | Status: SHIPPED | OUTPATIENT
Start: 2025-06-24

## 2025-06-24 ASSESSMENT — PAIN SCALES - GENERAL
PAINLEVEL_OUTOF10: SEVERE PAIN (8)
PAINLEVEL_OUTOF10: MODERATE PAIN (6)

## 2025-06-24 NOTE — NURSING NOTE
"Chief Complaint   Patient presents with    RECHECK       Initial BP (!) 150/82 (BP Location: Right arm, Patient Position: Sitting, Cuff Size: Adult Regular)   Pulse 103   Temp 98.6  F (37  C) (Temporal)   Resp 18   Wt 61.1 kg (134 lb 9.6 oz)   LMP 07/22/1993   SpO2 98%   BMI 28.13 kg/m   Estimated body mass index is 28.13 kg/m  as calculated from the following:    Height as of 6/5/25: 1.473 m (4' 10\").    Weight as of this encounter: 61.1 kg (134 lb 9.6 oz).  Medication Reconciliation: complete    Duane Gallegos RN   "

## 2025-06-24 NOTE — PROGRESS NOTES
Rebekah is a 80-year-old who presents for follow-up after small bowel obstruction in the hospital.  This responded to medical therapy promptly.  Her CT scan was somewhat abnormal but limited by technique.      BP (!) 150/82 (BP Location: Right arm, Patient Position: Sitting, Cuff Size: Adult Regular)   Pulse 103   Temp 98.6  F (37  C) (Temporal)   Resp 18   Wt 61.1 kg (134 lb 9.6 oz)   LMP 07/22/1993   SpO2 98%   BMI 28.13 kg/m      General: NAD, pleasant and cooperative with exam and interview.  CV regular rate rhythm respiratory clear to auscultation  Abdomen: Nondistended   psychiatry: awake, alert and oriented. Appropriate affect.    Assessment/Plan:  80-year-old female with abnormal jejunum on CT scan.  Questionable diverticular versus dilation of aneurysmal type change.  Likely just poor technique and no p.o. contrast.  Offered diagnostic laparoscopy and small bowel resection if medical or other diverticular process exists to prevent bacterial overgrowth and potential for perforation.    Discussed risk benefits alternatives surgery    She is going to figure out Medicare and secondary coverage prior to scheduling.    Manolo Whelan MD on 6/24/2025 at 1:22 PM

## 2025-06-24 NOTE — PROGRESS NOTES
SUBJECTIVE:   Rebekah Shirley is a 80 year old female who presents to clinic today for the following health issues:    History of Present Illness       Reason for visit:  Check up She is missing 1 dose(s) of medications per week.    Patient arrives here for follow-up medication.  Prednisone refill.  Patient reports that she has been on prednisone 5 to 10 years.  Her last visit for rheumatology for rheumatoid arthritis was about a year and a half ago.  She states she has difficulty getting to [place].  She is also on Plaquenil.  Medication she is updated.  She is stopped her Arava but continues on Plaquenil.  She states that the prednisone seems to help the best.  She occasionally misses some dosage and reports that the pressure rheumatoid arthritis flares up easily.        Review of Systems     OBJECTIVE:     BP (!) 150/82 (BP Location: Right arm)   Pulse 92   Temp 98  F (36.7  C)   Resp 20   Wt 60.1 kg (132 lb 6.4 oz)   LMP 07/22/1993   SpO2 98%   BMI 27.67 kg/m    Body mass index is 27.67 kg/m .  Physical Exam  Constitutional:       Appearance: Normal appearance.   Pulmonary:      Effort: Pulmonary effort is normal.      Breath sounds: Normal breath sounds.   Musculoskeletal:      Comments: Significant degenerative changes in her hands.  Ulnar deviation.   Neurological:      Mental Status: She is alert.         Diagnostic Test Results:  No results found for this or any previous visit (from the past 24 hours).    ASSESSMENT/PLAN:         Will continue prednisone(M05.79) Rheumatoid arthritis involving multiple sites with positive rheumatoid factor (H)  (primary encounter diagnosis)  Comment:   Plan: predniSONE (DELTASONE) 10 MG tablet, Adult         Rheumatology  Referral        Patient is given a few extra doses to use on occasion will arrange rheumatology consult    (I10) Primary hypertension  Comment: Currently not under good control.  Add lisinopril.  Continue with blood pressure checks at home.   If under good control drop off her readings.  Otherwise follow-up in a month  Plan: lisinopril (ZESTRIL) 5 MG tablet            (E78.2) Mixed hyperlipidemia  Comment: Refill  Plan: rosuvastatin (CRESTOR) 20 MG tablet              The longitudinal plan of care for the diagnosis(es)/condition(s) as documented were addressed during this visit. Due to the added complexity in care, I will continue to support Rebekah in the subsequent management and with ongoing continuity of care.        Vincent Fuller MD  [facility]

## 2025-06-24 NOTE — NURSING NOTE
Patient here for medication follow up. Medication Reconciliation: complete.    Sharon Perera LPN  6/24/2025 10:55 AM

## 2025-08-08 ENCOUNTER — TELEPHONE (OUTPATIENT)
Dept: SURGERY | Facility: OTHER | Age: 80
End: 2025-08-08
Payer: MEDICARE

## 2025-08-13 DIAGNOSIS — K57.00: Primary | ICD-10-CM

## 2025-08-13 RX ORDER — CEFAZOLIN SODIUM 2 G/100ML
2 INJECTION, SOLUTION INTRAVENOUS
OUTPATIENT
Start: 2025-08-13

## 2025-08-13 RX ORDER — CEFAZOLIN SODIUM 2 G/100ML
2 INJECTION, SOLUTION INTRAVENOUS SEE ADMIN INSTRUCTIONS
OUTPATIENT
Start: 2025-08-13

## 2025-08-14 ENCOUNTER — HOSPITAL ENCOUNTER (INPATIENT)
Facility: OTHER | Age: 80
Setting detail: SURGERY ADMIT
End: 2025-08-14
Attending: SURGERY | Admitting: SURGERY
Payer: MEDICARE

## 2025-08-18 ENCOUNTER — RESULTS FOLLOW-UP (OUTPATIENT)
Facility: OTHER | Age: 80
End: 2025-08-18

## 2025-08-18 ENCOUNTER — OFFICE VISIT (OUTPATIENT)
Facility: OTHER | Age: 80
End: 2025-08-18
Attending: NURSE PRACTITIONER
Payer: MEDICARE

## 2025-08-18 ENCOUNTER — TELEPHONE (OUTPATIENT)
Facility: OTHER | Age: 80
End: 2025-08-18

## 2025-08-18 VITALS — TEMPERATURE: 97.3 F | BODY MASS INDEX: 28.88 KG/M2 | OXYGEN SATURATION: 97 % | WEIGHT: 138.2 LBS | HEART RATE: 84 BPM

## 2025-08-18 DIAGNOSIS — Z28.39 UNDERIMMUNIZATION STATUS: ICD-10-CM

## 2025-08-18 DIAGNOSIS — M81.0 OSTEOPOROSIS WITHOUT CURRENT PATHOLOGICAL FRACTURE, UNSPECIFIED OSTEOPOROSIS TYPE: ICD-10-CM

## 2025-08-18 DIAGNOSIS — Z79.899 OTHER LONG TERM (CURRENT) DRUG THERAPY: ICD-10-CM

## 2025-08-18 DIAGNOSIS — I25.10 NON-OCCLUSIVE CORONARY ARTERY DISEASE: ICD-10-CM

## 2025-08-18 DIAGNOSIS — K21.00 GASTROESOPHAGEAL REFLUX DISEASE WITH ESOPHAGITIS WITHOUT HEMORRHAGE: ICD-10-CM

## 2025-08-18 DIAGNOSIS — Z71.89 OTHER SPECIFIED COUNSELING: Chronic | ICD-10-CM

## 2025-08-18 DIAGNOSIS — D63.8 ANEMIA OF CHRONIC DISEASE: ICD-10-CM

## 2025-08-18 DIAGNOSIS — D84.9 IMMUNOCOMPROMISED PATIENT: ICD-10-CM

## 2025-08-18 DIAGNOSIS — Z11.59 ENCOUNTER FOR SCREENING FOR OTHER VIRAL DISEASES: ICD-10-CM

## 2025-08-18 DIAGNOSIS — M05.79 RHEUMATOID ARTHRITIS, SEROPOSITIVE, MULTIPLE SITES (H): Primary | ICD-10-CM

## 2025-08-18 DIAGNOSIS — Z79.52 LONG-TERM CORTICOSTEROID USE: ICD-10-CM

## 2025-08-18 LAB
ALBUMIN SERPL BCG-MCNC: 3.9 G/DL (ref 3.5–5.2)
ALP SERPL-CCNC: 88 U/L (ref 40–150)
ALT SERPL W P-5'-P-CCNC: 12 U/L (ref 0–50)
ANION GAP SERPL CALCULATED.3IONS-SCNC: 13 MMOL/L (ref 7–15)
AST SERPL W P-5'-P-CCNC: 16 U/L (ref 0–45)
BASOPHILS # BLD AUTO: 0.03 10E3/UL (ref 0–0.2)
BASOPHILS NFR BLD AUTO: 0.3 %
BILIRUB SERPL-MCNC: 0.2 MG/DL
BUN SERPL-MCNC: 16.1 MG/DL (ref 8–23)
CALCIUM SERPL-MCNC: 8.7 MG/DL (ref 8.8–10.4)
CHLORIDE SERPL-SCNC: 106 MMOL/L (ref 98–107)
CREAT SERPL-MCNC: 0.76 MG/DL (ref 0.51–0.95)
CRP SERPL-MCNC: 11.03 MG/L
EGFRCR SERPLBLD CKD-EPI 2021: 80 ML/MIN/1.73M2
EOSINOPHIL # BLD AUTO: 0.04 10E3/UL (ref 0–0.7)
EOSINOPHIL NFR BLD AUTO: 0.4 %
ERYTHROCYTE [DISTWIDTH] IN BLOOD BY AUTOMATED COUNT: 16.7 % (ref 10–15)
ERYTHROCYTE [SEDIMENTATION RATE] IN BLOOD BY WESTERGREN METHOD: 44 MM/HR (ref 0–30)
GLUCOSE SERPL-MCNC: 100 MG/DL (ref 70–99)
HBV CORE IGM SERPL QL IA: NONREACTIVE
HBV SURFACE AB SERPL IA-ACNC: <3.5 M[IU]/ML
HBV SURFACE AB SERPL IA-ACNC: NONREACTIVE M[IU]/ML
HBV SURFACE AG SERPL QL IA: NONREACTIVE
HCO3 SERPL-SCNC: 21 MMOL/L (ref 22–29)
HCT VFR BLD AUTO: 34.2 % (ref 35–47)
HCV AB SERPL QL IA: NONREACTIVE
HGB BLD-MCNC: 10.6 G/DL (ref 11.7–15.7)
IMM GRANULOCYTES # BLD: 0.08 10E3/UL
IMM GRANULOCYTES NFR BLD: 0.8 %
LYMPHOCYTES # BLD AUTO: 0.79 10E3/UL (ref 0.8–5.3)
LYMPHOCYTES NFR BLD AUTO: 7.5 %
MCH RBC QN AUTO: 25.6 PG (ref 26.5–33)
MCHC RBC AUTO-ENTMCNC: 31 G/DL (ref 31.5–36.5)
MCV RBC AUTO: 82.6 FL (ref 78–100)
MONOCYTES # BLD AUTO: 0.23 10E3/UL (ref 0–1.3)
MONOCYTES NFR BLD AUTO: 2.2 %
NEUTROPHILS # BLD AUTO: 9.43 10E3/UL (ref 1.6–8.3)
NEUTROPHILS NFR BLD AUTO: 88.8 %
NRBC # BLD AUTO: <0.03 10E3/UL
NRBC BLD AUTO-RTO: 0 /100
PLATELET # BLD AUTO: 352 10E3/UL (ref 150–450)
POTASSIUM SERPL-SCNC: 4.1 MMOL/L (ref 3.4–5.3)
PROT SERPL-MCNC: 7.5 G/DL (ref 6.4–8.3)
RBC # BLD AUTO: 4.14 10E6/UL (ref 3.8–5.2)
SODIUM SERPL-SCNC: 140 MMOL/L (ref 135–145)
WBC # BLD AUTO: 10.6 10E3/UL (ref 4–11)

## 2025-08-18 PROCEDURE — 82306 VITAMIN D 25 HYDROXY: CPT | Mod: ZL | Performed by: NURSE PRACTITIONER

## 2025-08-18 PROCEDURE — 86706 HEP B SURFACE ANTIBODY: CPT | Mod: ZL | Performed by: NURSE PRACTITIONER

## 2025-08-18 PROCEDURE — 86803 HEPATITIS C AB TEST: CPT | Mod: ZL | Performed by: NURSE PRACTITIONER

## 2025-08-18 PROCEDURE — 82310 ASSAY OF CALCIUM: CPT | Mod: ZL | Performed by: NURSE PRACTITIONER

## 2025-08-18 PROCEDURE — 85025 COMPLETE CBC W/AUTO DIFF WBC: CPT | Mod: ZL | Performed by: NURSE PRACTITIONER

## 2025-08-18 PROCEDURE — 86140 C-REACTIVE PROTEIN: CPT | Mod: ZL | Performed by: NURSE PRACTITIONER

## 2025-08-18 PROCEDURE — G0463 HOSPITAL OUTPT CLINIC VISIT: HCPCS

## 2025-08-18 PROCEDURE — 99205 OFFICE O/P NEW HI 60 MIN: CPT | Performed by: NURSE PRACTITIONER

## 2025-08-18 PROCEDURE — 86705 HEP B CORE ANTIBODY IGM: CPT | Mod: ZL | Performed by: NURSE PRACTITIONER

## 2025-08-18 PROCEDURE — 36415 COLL VENOUS BLD VENIPUNCTURE: CPT | Mod: ZL | Performed by: NURSE PRACTITIONER

## 2025-08-18 PROCEDURE — 85652 RBC SED RATE AUTOMATED: CPT | Mod: ZL | Performed by: NURSE PRACTITIONER

## 2025-08-18 PROCEDURE — 1125F AMNT PAIN NOTED PAIN PRSNT: CPT | Performed by: NURSE PRACTITIONER

## 2025-08-18 PROCEDURE — G2211 COMPLEX E/M VISIT ADD ON: HCPCS | Performed by: NURSE PRACTITIONER

## 2025-08-18 PROCEDURE — 87340 HEPATITIS B SURFACE AG IA: CPT | Mod: ZL | Performed by: NURSE PRACTITIONER

## 2025-08-18 PROCEDURE — 86481 TB AG RESPONSE T-CELL SUSP: CPT | Mod: ZL | Performed by: NURSE PRACTITIONER

## 2025-08-18 RX ORDER — PREDNISONE 5 MG/1
TABLET ORAL
Qty: 123 TABLET | Refills: 0 | Status: SHIPPED | OUTPATIENT
Start: 2025-08-18 | End: 2025-10-06

## 2025-08-18 RX ORDER — CERTOLIZUMAB PEGOL 200 MG/ML
400 INJECTION, SOLUTION SUBCUTANEOUS
Qty: 6 ML | Refills: 0 | OUTPATIENT
Start: 2025-08-18

## 2025-08-18 RX ORDER — HYDROXYCHLOROQUINE SULFATE 200 MG/1
TABLET, FILM COATED ORAL
Qty: 135 TABLET | Refills: 3 | Status: SHIPPED | OUTPATIENT
Start: 2025-08-18

## 2025-08-18 ASSESSMENT — RHEUMATOLOGY NEW PATIENT QUESTIONNAIRE
MUSCLE WEAKNESS: Y
MEMORY LOSS: N
NUMBNESS OR TINGLING IN HANDS OR FEET: Y
HEADACHES: Y
BLACK STOOLS: N
DIFFICULTY BREATHING LYING DOWN: N
ABNORMAL URINE: N
DEPRESSION: Y
ANXIETY: Y
EASY BRUISING: Y
VOMITING OF BLOOD OR COFFEE GROUND CONSISTENCY MATERIAL: N
AGITATION: Y
COLOR CHANGES OF HANDS OR FEET IN THE COLD: N
PERSISTENT DIARRHEA: N
UNUSUAL FATIGUE: Y
JOINT PAIN: Y
FAINTING: N
HEARTBURN OR REFLUX: Y
SKIN REDNESS: Y
SHORTNESS OF BREATH: Y
EXCESSIVE HAIR LOSS (MORE THAN YOUR NORM): N
RASH OR ULCERS: N
SEIZURES: N
LOSS OF VISION: Y
SUN SENSITIVE (SUN ALLERGY): N
UNUSUAL BLEEDING: N
UNEXPLAINED WEIGHT CHANGE: N
STOMACH PAIN: Y
NIGHT SWEATS: Y
JOINT SWELLING: Y
NODULES/BUMPS: Y
MORNING STIFFNESS IN LOWER BACK: Y
EASILY LOSING TEMPER: Y
HOW WOULD YOU DESCRIBE YOUR STIFFNESS ON AVERAGE: MILD
LOSS OF CONSCIOUSNESS: N
ANEMIA: N
MORNING STIFFNESS: Y
DIFFICULTY SWALLOWING: Y
COUGH: N
FEVER: N
SWOLLEN OR TENDER GLANDS: N
JAUNDICE: Y
NAUSEA: Y
DIFFICULTY FALLING ASLEEP: Y
RASH: N
DOUBLE OR BLURRED VISION: Y
CHEST PAIN: Y
VAGINAL DRYNESS: N
SWOLLEN LEGS OR FEET: Y
INCREASED SUSCEPTIBILITY TO INFECTION: N
SKIN TIGHTNESS: N
BLOOD IN STOOLS: N
DIFFICULTY STAYING ASLEEP: Y
PAIN OR BURNING ON URINATION: N
BEHAVIORAL CHANGES: Y

## 2025-08-18 ASSESSMENT — PAIN SCALES - GENERAL: PAINLEVEL_OUTOF10: SEVERE PAIN (10)

## 2025-08-20 LAB
DEPRECATED CALCIDIOL+CALCIFEROL SERPL-MC: 51 UG/L (ref 20–75)
GAMMA INTERFERON BACKGROUND BLD IA-ACNC: 0.05 IU/ML
M TB IFN-G BLD-IMP: NEGATIVE
M TB IFN-G CD4+ BCKGRND COR BLD-ACNC: 3.48 IU/ML
MITOGEN IGNF BCKGRD COR BLD-ACNC: 0.01 IU/ML
MITOGEN IGNF BCKGRD COR BLD-ACNC: 0.01 IU/ML
QUANTIFERON MITOGEN: 3.53 IU/ML
QUANTIFERON NIL TUBE: 0.05 IU/ML
QUANTIFERON TB1 TUBE: 0.06 IU/ML
QUANTIFERON TB2 TUBE: 0.06
VITAMIN D2 SERPL-MCNC: 8 UG/L
VITAMIN D3 SERPL-MCNC: 43 UG/L

## 2025-09-04 ENCOUNTER — OFFICE VISIT (OUTPATIENT)
Dept: FAMILY MEDICINE | Facility: OTHER | Age: 80
End: 2025-09-04
Attending: FAMILY MEDICINE
Payer: MEDICARE

## 2025-09-04 ENCOUNTER — TELEPHONE (OUTPATIENT)
Dept: SURGERY | Facility: OTHER | Age: 80
End: 2025-09-04

## 2025-09-04 VITALS
WEIGHT: 138.2 LBS | HEIGHT: 58 IN | SYSTOLIC BLOOD PRESSURE: 172 MMHG | DIASTOLIC BLOOD PRESSURE: 90 MMHG | HEART RATE: 88 BPM | RESPIRATION RATE: 20 BRPM | BODY MASS INDEX: 29.01 KG/M2 | OXYGEN SATURATION: 97 % | TEMPERATURE: 98.2 F

## 2025-09-04 DIAGNOSIS — R10.84 GENERALIZED ABDOMINAL PAIN: Primary | ICD-10-CM

## 2025-09-04 PROCEDURE — G0463 HOSPITAL OUTPT CLINIC VISIT: HCPCS

## 2025-09-04 ASSESSMENT — PAIN SCALES - GENERAL: PAINLEVEL_OUTOF10: SEVERE PAIN (8)

## (undated) RX ORDER — ONDANSETRON 2 MG/ML
INJECTION INTRAMUSCULAR; INTRAVENOUS
Status: DISPENSED
Start: 2025-06-05

## (undated) RX ORDER — TRIAMCINOLONE ACETONIDE 40 MG/ML
INJECTION, SUSPENSION INTRA-ARTICULAR; INTRAMUSCULAR
Status: DISPENSED
Start: 2019-01-07

## (undated) RX ORDER — FENTANYL CITRATE 50 UG/ML
INJECTION, SOLUTION INTRAMUSCULAR; INTRAVENOUS
Status: DISPENSED
Start: 2025-06-05

## (undated) RX ORDER — LIDOCAINE HYDROCHLORIDE 10 MG/ML
INJECTION, SOLUTION INFILTRATION; PERINEURAL
Status: DISPENSED
Start: 2021-08-06

## (undated) RX ORDER — LIDOCAINE HYDROCHLORIDE 10 MG/ML
INJECTION, SOLUTION INFILTRATION; PERINEURAL
Status: DISPENSED
Start: 2019-01-07

## (undated) RX ORDER — PREDNISONE 20 MG/1
TABLET ORAL
Status: DISPENSED
Start: 2024-03-16

## (undated) RX ORDER — ACETAMINOPHEN 500 MG
TABLET ORAL
Status: DISPENSED
Start: 2024-03-16

## (undated) RX ORDER — FENTANYL CITRATE 50 UG/ML
INJECTION, SOLUTION INTRAMUSCULAR; INTRAVENOUS
Status: DISPENSED
Start: 2023-07-18

## (undated) RX ORDER — KETOROLAC TROMETHAMINE 15 MG/ML
INJECTION, SOLUTION INTRAMUSCULAR; INTRAVENOUS
Status: DISPENSED
Start: 2025-03-29

## (undated) RX ORDER — ASPIRIN 81 MG/1
TABLET, CHEWABLE ORAL
Status: DISPENSED
Start: 2019-03-16

## (undated) RX ORDER — OXYCODONE AND ACETAMINOPHEN 5; 325 MG/1; MG/1
TABLET ORAL
Status: DISPENSED
Start: 2024-03-16